# Patient Record
Sex: FEMALE | Race: BLACK OR AFRICAN AMERICAN | NOT HISPANIC OR LATINO | Employment: OTHER | URBAN - METROPOLITAN AREA
[De-identification: names, ages, dates, MRNs, and addresses within clinical notes are randomized per-mention and may not be internally consistent; named-entity substitution may affect disease eponyms.]

---

## 2023-04-10 LAB
ALANINE AMINOTRANSFERASE (SGPT) (U/L) IN SER/PLAS: 8 U/L (ref 7–45)
ALBUMIN (G/DL) IN SER/PLAS: 3.9 G/DL (ref 3.4–5)
ALKALINE PHOSPHATASE (U/L) IN SER/PLAS: 57 U/L (ref 33–136)
ASPARTATE AMINOTRANSFERASE (SGOT) (U/L) IN SER/PLAS: 16 U/L (ref 9–39)
BILIRUBIN DIRECT (MG/DL) IN SER/PLAS: 0.1 MG/DL (ref 0–0.3)
BILIRUBIN TOTAL (MG/DL) IN SER/PLAS: 0.9 MG/DL (ref 0–1.2)
ERYTHROCYTE DISTRIBUTION WIDTH (RATIO) BY AUTOMATED COUNT: 17.2 % (ref 11.5–14.5)
ERYTHROCYTE MEAN CORPUSCULAR HEMOGLOBIN CONCENTRATION (G/DL) BY AUTOMATED: 34.7 G/DL (ref 32–36)
ERYTHROCYTE MEAN CORPUSCULAR VOLUME (FL) BY AUTOMATED COUNT: 82 FL (ref 80–100)
ERYTHROCYTES (10*6/UL) IN BLOOD BY AUTOMATED COUNT: 4.02 X10E12/L (ref 4–5.2)
HEMATOCRIT (%) IN BLOOD BY AUTOMATED COUNT: 32.9 % (ref 36–46)
HEMOGLOBIN (G/DL) IN BLOOD: 11.4 G/DL (ref 12–16)
IRON (UG/DL) IN SER/PLAS: 80 UG/DL (ref 35–150)
IRON BINDING CAPACITY (UG/DL) IN SER/PLAS: 366 UG/DL (ref 240–445)
IRON SATURATION (%) IN SER/PLAS: 22 % (ref 25–45)
LEUKOCYTES (10*3/UL) IN BLOOD BY AUTOMATED COUNT: 6.4 X10E9/L (ref 4.4–11.3)
NRBC (PER 100 WBCS) BY AUTOMATED COUNT: 0 /100 WBC (ref 0–0)
PLATELETS (10*3/UL) IN BLOOD AUTOMATED COUNT: 145 X10E9/L (ref 150–450)
PROTEIN TOTAL: 7 G/DL (ref 6.4–8.2)

## 2023-07-05 LAB
ALANINE AMINOTRANSFERASE (SGPT) (U/L) IN SER/PLAS: 6 U/L (ref 7–45)
ALBUMIN (G/DL) IN SER/PLAS: 3.4 G/DL (ref 3.4–5)
ALKALINE PHOSPHATASE (U/L) IN SER/PLAS: 56 U/L (ref 33–136)
ANION GAP IN SER/PLAS: 10 MMOL/L (ref 10–20)
ASPARTATE AMINOTRANSFERASE (SGOT) (U/L) IN SER/PLAS: 13 U/L (ref 9–39)
BILIRUBIN TOTAL (MG/DL) IN SER/PLAS: 0.5 MG/DL (ref 0–1.2)
CALCIUM (MG/DL) IN SER/PLAS: 8.5 MG/DL (ref 8.6–10.3)
CARBON DIOXIDE, TOTAL (MMOL/L) IN SER/PLAS: 30 MMOL/L (ref 21–32)
CHLORIDE (MMOL/L) IN SER/PLAS: 104 MMOL/L (ref 98–107)
CREATININE (MG/DL) IN SER/PLAS: 1.18 MG/DL (ref 0.5–1.05)
ERYTHROCYTE DISTRIBUTION WIDTH (RATIO) BY AUTOMATED COUNT: 15.5 % (ref 11.5–14.5)
ERYTHROCYTE MEAN CORPUSCULAR HEMOGLOBIN CONCENTRATION (G/DL) BY AUTOMATED: 35 G/DL (ref 32–36)
ERYTHROCYTE MEAN CORPUSCULAR VOLUME (FL) BY AUTOMATED COUNT: 80 FL (ref 80–100)
ERYTHROCYTES (10*6/UL) IN BLOOD BY AUTOMATED COUNT: 4.05 X10E12/L (ref 4–5.2)
GFR FEMALE: 50 ML/MIN/1.73M2
GLUCOSE (MG/DL) IN SER/PLAS: 70 MG/DL (ref 74–99)
HEMATOCRIT (%) IN BLOOD BY AUTOMATED COUNT: 32.3 % (ref 36–46)
HEMOGLOBIN (G/DL) IN BLOOD: 11.3 G/DL (ref 12–16)
LEUKOCYTES (10*3/UL) IN BLOOD BY AUTOMATED COUNT: 5.9 X10E9/L (ref 4.4–11.3)
NRBC (PER 100 WBCS) BY AUTOMATED COUNT: 0 /100 WBC (ref 0–0)
PLATELETS (10*3/UL) IN BLOOD AUTOMATED COUNT: 123 X10E9/L (ref 150–450)
POTASSIUM (MMOL/L) IN SER/PLAS: 4.4 MMOL/L (ref 3.5–5.3)
PROTEIN TOTAL: 6.1 G/DL (ref 6.4–8.2)
SODIUM (MMOL/L) IN SER/PLAS: 140 MMOL/L (ref 136–145)
UREA NITROGEN (MG/DL) IN SER/PLAS: 20 MG/DL (ref 6–23)

## 2023-08-22 ENCOUNTER — OFFICE VISIT (OUTPATIENT)
Dept: PRIMARY CARE | Facility: CLINIC | Age: 68
End: 2023-08-22
Payer: COMMERCIAL

## 2023-08-22 VITALS
WEIGHT: 194.6 LBS | HEIGHT: 72 IN | BODY MASS INDEX: 26.36 KG/M2 | HEART RATE: 78 BPM | DIASTOLIC BLOOD PRESSURE: 66 MMHG | SYSTOLIC BLOOD PRESSURE: 118 MMHG | TEMPERATURE: 96.6 F | OXYGEN SATURATION: 98 %

## 2023-08-22 DIAGNOSIS — F51.01 PRIMARY INSOMNIA: ICD-10-CM

## 2023-08-22 DIAGNOSIS — G89.4 CHRONIC PAIN SYNDROME: Primary | ICD-10-CM

## 2023-08-22 PROCEDURE — 1125F AMNT PAIN NOTED PAIN PRSNT: CPT | Performed by: STUDENT IN AN ORGANIZED HEALTH CARE EDUCATION/TRAINING PROGRAM

## 2023-08-22 PROCEDURE — 99215 OFFICE O/P EST HI 40 MIN: CPT | Performed by: STUDENT IN AN ORGANIZED HEALTH CARE EDUCATION/TRAINING PROGRAM

## 2023-08-22 RX ORDER — ZOLPIDEM TARTRATE 10 MG/1
10 TABLET ORAL NIGHTLY
COMMUNITY
End: 2023-08-22 | Stop reason: SDUPTHER

## 2023-08-22 RX ORDER — PREDNISONE 10 MG/1
10 TABLET ORAL EVERY OTHER DAY
COMMUNITY
Start: 2022-10-10 | End: 2024-04-26 | Stop reason: SDUPTHER

## 2023-08-22 RX ORDER — GUAIFENESIN 600 MG/1
600 TABLET, EXTENDED RELEASE ORAL EVERY 12 HOURS PRN
COMMUNITY

## 2023-08-22 RX ORDER — ERGOCALCIFEROL 1.25 MG/1
1 CAPSULE ORAL
COMMUNITY

## 2023-08-22 RX ORDER — POLYETHYLENE GLYCOL 3350 17 G/17G
17 POWDER, FOR SOLUTION ORAL 3 TIMES DAILY PRN
COMMUNITY

## 2023-08-22 RX ORDER — ASCORBIC ACID 500 MG
500 TABLET ORAL DAILY
COMMUNITY

## 2023-08-22 RX ORDER — NITROGLYCERIN 0.4 MG/1
TABLET SUBLINGUAL
COMMUNITY

## 2023-08-22 RX ORDER — FERROUS SULFATE 325(65) MG
65 TABLET, DELAYED RELEASE (ENTERIC COATED) ORAL 3 TIMES WEEKLY
COMMUNITY

## 2023-08-22 RX ORDER — LEVOTHYROXINE SODIUM 137 UG/1
137 TABLET ORAL
COMMUNITY
End: 2023-11-21

## 2023-08-22 RX ORDER — LIDOCAINE 50 MG/G
1 PATCH TOPICAL DAILY
COMMUNITY
End: 2023-09-05

## 2023-08-22 RX ORDER — DOCUSATE SODIUM 100 MG/1
100 CAPSULE, LIQUID FILLED ORAL 2 TIMES DAILY PRN
COMMUNITY

## 2023-08-22 RX ORDER — IRON, SODIUM ASCORBATE, THIAMINE MONONITRATE, RIBOFLAVIN, PYRIDOXINE HYDROCHLORIDE, FOLIC ACID, CYANOCOBALAMIN, NIACINAMIDE, CALCIUM PANTOTHENATE, ZINC SULFATE, MAGNESIUM SULFATE, MANGANESE SULFATE, AND CUPRIC SULFATE ANHYDROUS 106; 200; 10; 6; 5; 1; 15; 30; 10; 18.2; 6.9; 1.3; .8 MG/1; MG/1; MG/1; MG/1; MG/1; MG/1; UG/1; MG/1; MG/1; MG/1; MG/1; MG/1; MG/1
1 CAPSULE ORAL DAILY
COMMUNITY

## 2023-08-22 RX ORDER — POTASSIUM CHLORIDE 1500 MG/1
1 TABLET, EXTENDED RELEASE ORAL DAILY
COMMUNITY
End: 2023-09-11 | Stop reason: SDUPTHER

## 2023-08-22 RX ORDER — DIVALPROEX SODIUM 250 MG/1
250 TABLET, FILM COATED, EXTENDED RELEASE ORAL 3 TIMES DAILY
COMMUNITY

## 2023-08-22 RX ORDER — FUROSEMIDE 20 MG/1
20 TABLET ORAL 3 TIMES WEEKLY
COMMUNITY

## 2023-08-22 RX ORDER — HYDROCODONE BITARTRATE AND ACETAMINOPHEN 5; 325 MG/1; MG/1
1 TABLET ORAL 3 TIMES DAILY PRN
COMMUNITY

## 2023-08-22 RX ORDER — HYDROXYCHLOROQUINE SULFATE 200 MG/1
1 TABLET, FILM COATED ORAL DAILY
COMMUNITY

## 2023-08-22 RX ORDER — FLUTICASONE PROPIONATE AND SALMETEROL 50; 250 UG/1; UG/1
1 POWDER RESPIRATORY (INHALATION)
COMMUNITY

## 2023-08-22 RX ORDER — ATORVASTATIN CALCIUM 20 MG/1
20 TABLET, FILM COATED ORAL DAILY
COMMUNITY
Start: 2019-01-07 | End: 2023-11-15 | Stop reason: SDUPTHER

## 2023-08-22 RX ORDER — NAPROXEN SODIUM 220 MG/1
81 TABLET, FILM COATED ORAL DAILY
COMMUNITY

## 2023-08-22 RX ORDER — ESOMEPRAZOLE MAGNESIUM 40 MG/1
40 CAPSULE, DELAYED RELEASE ORAL DAILY
COMMUNITY
End: 2023-12-18

## 2023-08-22 RX ORDER — ZOLPIDEM TARTRATE 10 MG/1
10 TABLET ORAL NIGHTLY
Qty: 30 TABLET | Refills: 5 | Status: SHIPPED | OUTPATIENT
Start: 2023-08-22 | End: 2024-01-23

## 2023-08-22 RX ORDER — LORAZEPAM 1 MG/1
1 TABLET ORAL DAILY PRN
COMMUNITY
End: 2024-02-22 | Stop reason: WASHOUT

## 2023-08-22 RX ORDER — ALBUTEROL SULFATE 0.83 MG/ML
3 SOLUTION RESPIRATORY (INHALATION) EVERY 6 HOURS PRN
COMMUNITY

## 2023-08-22 RX ORDER — DULOXETIN HYDROCHLORIDE 60 MG/1
60 CAPSULE, DELAYED RELEASE ORAL DAILY
COMMUNITY
Start: 2023-08-09 | End: 2023-09-11 | Stop reason: SDUPTHER

## 2023-08-22 RX ORDER — ALBUTEROL SULFATE 90 UG/1
2 AEROSOL, METERED RESPIRATORY (INHALATION) EVERY 4 HOURS PRN
COMMUNITY
End: 2024-04-26 | Stop reason: SDUPTHER

## 2023-08-22 ASSESSMENT — ENCOUNTER SYMPTOMS
COUGH: 0
FEVER: 0
CONSTIPATION: 0
BLOOD IN STOOL: 0
WHEEZING: 0
APNEA: 1
SHORTNESS OF BREATH: 0
VOMITING: 0
NAUSEA: 0
ABDOMINAL PAIN: 0
CHILLS: 0
SLEEP DISTURBANCE: 1
ARTHRALGIAS: 1
DIARRHEA: 0

## 2023-08-22 NOTE — PROGRESS NOTES
Subjective   Patient ID: Florencia Wang is a 67 y.o. female who presents for New Patient Visit and Med Refill (Norco, Ambien, Lidocaine patches, Lorazepam). Patient uses an oxygen tank at night set a 2LPM; she sometimes uses a portable O2 tank.    HPI   Pt is a 67 year old female with complicated medical history presents to the office for establishment and medication refill.     Right Hip Pain w/ Sciatica: Pt had been receiving cortisone shots, but in  was complicated by Sciatica nerve injury and excessive bleeding resulting in Hospital admission and rehab facility stay. Pt now uses a walker to get around, which before was only using a cane. Using Cymbalta and lidocaine patches with home physical therapy.     SLE: Hydroxychloroquine 200 mg. Follows with Dr. Hagen.     Sickle Cell: Last crisis was 30 years ago requiring hospitalization.     COPD/Asthma: On Advair, albuterol. Having to take rescue inhaler 1-2 times a week. No nightly wheezing or coughing    ROM: Not on CPAP, but uses bedside oxygen for periods of apnea.    Uncontrolled Migraines: Depakote 250mg BID (TID as needed). Follows with Dr. Paulson. Most recent one was yesterday.     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 Follows with Dr. Paulson.    CVA/TIA: On ASA/statin    CAD: Follows with Dr. Hawk. Last visit was .     Anxiety: Takes Ativan 1mg daily for anxiety and sleep. Takes Ambien nightly    Melanoma: s/p resection    Surghx: Cataract surgery, 2x , hysterectomy, cataract surgery b/l    Prevention: Colonoscopy scheduled for 2024. Hx of benign polyps. Mammogram UTD, has prescription for next appointment. UTD Pap smear.     Review of Systems   Constitutional:  Negative for chills and fever.   Eyes:  Negative for visual disturbance.   Respiratory:  Positive for apnea. Negative for cough, shortness of breath and wheezing.    Gastrointestinal:  Negative for abdominal pain,  blood in stool, constipation, diarrhea, nausea and vomiting.   Musculoskeletal:  Positive for arthralgias and gait problem.   Skin:  Negative for rash.   Psychiatric/Behavioral:  Positive for sleep disturbance.        Objective   /66 (BP Location: Left arm, Patient Position: Sitting)   Pulse 78   Temp 35.9 °C (96.6 °F) (Temporal)   Ht 1.829 m (6')   Wt 88.3 kg (194 lb 9.6 oz)   SpO2 98%   BMI 26.39 kg/m²     Physical Exam  Constitutional:       General: She is not in acute distress.     Appearance: Normal appearance. She is normal weight.   HENT:      Head: Normocephalic and atraumatic.   Neck:      Vascular: No carotid bruit.   Cardiovascular:      Rate and Rhythm: Normal rate and regular rhythm.      Pulses: Normal pulses.      Heart sounds: Normal heart sounds. No murmur heard.  Pulmonary:      Effort: Pulmonary effort is normal. No respiratory distress.      Breath sounds: Normal breath sounds. No wheezing.   Abdominal:      General: Abdomen is flat. There is no distension.      Palpations: Abdomen is soft. There is no mass.      Tenderness: There is no abdominal tenderness.   Musculoskeletal:         General: No swelling or tenderness.      Cervical back: No tenderness.   Skin:     Capillary Refill: Capillary refill takes less than 2 seconds.      Findings: No bruising, erythema or rash.   Neurological:      Mental Status: She is alert.         Assessment/Plan   1. Chronic pain syndrome  - had a very long discussion about her pain states nothing works ore has side effects has been given short supply of oxycodone in past to which helps. Has been hospitalized twice in past month as well  Currenlty opiates would not be a good solution for her  I advised to follow up with pain management  - Referral to Pain Medicine; Future    2. Primary insomnia  Oarrs revwied refill given  csa  - zolpidem (Ambien) 10 mg tablet; Take 1 tablet (10 mg) by mouth once daily at bedtime.  Dispense: 30 tablet; Refill:  5      Semaj Dee DO. PGY-1

## 2023-09-05 ENCOUNTER — OFFICE VISIT (OUTPATIENT)
Dept: PRIMARY CARE | Facility: CLINIC | Age: 68
End: 2023-09-05
Payer: COMMERCIAL

## 2023-09-05 VITALS
OXYGEN SATURATION: 98 % | WEIGHT: 194 LBS | DIASTOLIC BLOOD PRESSURE: 72 MMHG | BODY MASS INDEX: 26.28 KG/M2 | SYSTOLIC BLOOD PRESSURE: 118 MMHG | HEART RATE: 82 BPM | HEIGHT: 72 IN

## 2023-09-05 DIAGNOSIS — M19.90 ARTHRITIS: Primary | ICD-10-CM

## 2023-09-05 PROCEDURE — 3074F SYST BP LT 130 MM HG: CPT | Performed by: STUDENT IN AN ORGANIZED HEALTH CARE EDUCATION/TRAINING PROGRAM

## 2023-09-05 PROCEDURE — 1125F AMNT PAIN NOTED PAIN PRSNT: CPT | Performed by: STUDENT IN AN ORGANIZED HEALTH CARE EDUCATION/TRAINING PROGRAM

## 2023-09-05 PROCEDURE — 99213 OFFICE O/P EST LOW 20 MIN: CPT | Performed by: STUDENT IN AN ORGANIZED HEALTH CARE EDUCATION/TRAINING PROGRAM

## 2023-09-05 PROCEDURE — 1159F MED LIST DOCD IN RCRD: CPT | Performed by: STUDENT IN AN ORGANIZED HEALTH CARE EDUCATION/TRAINING PROGRAM

## 2023-09-05 PROCEDURE — 3078F DIAST BP <80 MM HG: CPT | Performed by: STUDENT IN AN ORGANIZED HEALTH CARE EDUCATION/TRAINING PROGRAM

## 2023-09-05 RX ORDER — LIDOCAINE 50 MG/G
1 PATCH TOPICAL DAILY
Qty: 30 PATCH | Refills: 11 | Status: SHIPPED | OUTPATIENT
Start: 2023-09-05 | End: 2024-09-04

## 2023-09-05 ASSESSMENT — ENCOUNTER SYMPTOMS: OCCASIONAL FEELINGS OF UNSTEADINESS: 1

## 2023-09-05 NOTE — PROGRESS NOTES
Subjective   Patient ID: Florencia Wang is a 67 y.o. female who presents for Med Refill (Lidocaine patches) and Wheelchair Evaluation (Has had the Hoveround scooter previously, needs a new one; Chronic pain, difficulty walking long distances, gait instability, frequent falls, assistance with ADLs).    HPI     Arthritis: Patient has bilateral knee and hip pain.  She has not yet followed with pain management.  Given her severe tricompartmental disease in her knees. I did recommend she see orthopedic surgeon for surgery.  She needs a renewal for her Hoveround as she has limited mobility. She has at home health aide to help her out. Prescription sent for the lidocaine patches.    Addendum:   Patient will use the Hoveround in her home and in the community so that she can be more mobile and perform ADLs (e.g. going from one room to another). She is able to transfer to and from the Hoveround and safely operate it, both mentally and physically, and she is willing and motivated to use her replacement Hoveround. Patient will use the Hoveround so that she can get to the kitchen for meals (preparation and eating), to the bathroom for the toilet, to the bedroom to dress, and any other room in the house to use it without falling.    Patient is unable to use a cane as it does not provide enough stability and unable to use a walker as it does not provide enough support. Neither cane nor walker prevents her frequent falls or increases the distance she can safely walk. She is unable to use a manual wheelchair as this does not provide her with enough mobility and due to her extensive arthritis and loss of range of motion needed to utilize a manual wheelchair. Patient no longer has the postural stability to safely operate a POV and requires a power wheelchair.     Her upper and lower body extremity strength measurement is as follows:  RUE = 3  LUE = 3  RLE = 2  LLE = 2    (End addendum)      Review of Systems   All other systems  reviewed and are negative.      Objective   /72 (BP Location: Right arm, Patient Position: Sitting)   Pulse 82   Ht 1.829 m (6')   Wt 88 kg (194 lb)   SpO2 98%   BMI 26.31 kg/m²     Physical Exam  Constitutional:       Appearance: Normal appearance.   HENT:      Head: Normocephalic and atraumatic.      Right Ear: Tympanic membrane and ear canal normal.      Left Ear: Tympanic membrane and ear canal normal.      Mouth/Throat:      Mouth: Mucous membranes are moist.      Pharynx: Oropharynx is clear.   Eyes:      Extraocular Movements: Extraocular movements intact.      Conjunctiva/sclera: Conjunctivae normal.      Pupils: Pupils are equal, round, and reactive to light.   Cardiovascular:      Rate and Rhythm: Normal rate and regular rhythm.      Pulses: Normal pulses.      Heart sounds: Normal heart sounds.   Pulmonary:      Effort: Pulmonary effort is normal.      Breath sounds: Normal breath sounds.   Abdominal:      General: Abdomen is flat. Bowel sounds are normal.      Palpations: Abdomen is soft.   Musculoskeletal:         General: Normal range of motion.      Cervical back: Normal range of motion and neck supple.   Skin:     General: Skin is warm and dry.      Capillary Refill: Capillary refill takes 2 to 3 seconds.   Neurological:      General: No focal deficit present.      Mental Status: She is alert and oriented to person, place, and time. Mental status is at baseline.   Psychiatric:         Mood and Affect: Mood normal.         Behavior: Behavior normal.         Thought Content: Thought content normal.         Judgment: Judgment normal.         Assessment/Plan   1. Arthritis  lidocaine (Lidoderm) 5 % patch    Referral to Orthopaedic Surgery      1. Arthritis    - lidocaine (Lidoderm) 5 % patch; Place 1 patch over 12 hours on the skin once daily. Apply to painful area 12 hours per day, remove for 12 hours.  Dispense: 30 patch; Refill: 11  - Referral to Orthopaedic Surgery; Future

## 2023-09-07 ENCOUNTER — TELEPHONE (OUTPATIENT)
Dept: PRIMARY CARE | Facility: CLINIC | Age: 68
End: 2023-09-07
Payer: COMMERCIAL

## 2023-09-07 NOTE — TELEPHONE ENCOUNTER
Already aware this wouldn't be approved. Patient gets through a low cost pharmacy so that she can afford it.

## 2023-09-07 NOTE — TELEPHONE ENCOUNTER
[place] from the prior [facility] department called and states the request for medication that was sent for arthritis is not FDA approved and was denied.   States this was also faxed over to our office as well.  Appeal # [id number]  The appeal can be faxed to # [phone]

## 2023-09-11 DIAGNOSIS — R60.9 EDEMA, UNSPECIFIED TYPE: ICD-10-CM

## 2023-09-11 DIAGNOSIS — F41.1 GENERALIZED ANXIETY DISORDER: ICD-10-CM

## 2023-09-11 PROBLEM — I67.1 CEREBRAL ANEURYSM, NONRUPTURED (HHS-HCC): Status: ACTIVE | Noted: 2023-06-29

## 2023-09-11 PROBLEM — R53.81 PHYSICAL DECONDITIONING: Status: ACTIVE | Noted: 2018-02-22

## 2023-09-11 PROBLEM — R29.6 REPEATED FALLS: Status: ACTIVE | Noted: 2017-09-13

## 2023-09-11 PROBLEM — G47.33 OSA (OBSTRUCTIVE SLEEP APNEA): Status: ACTIVE | Noted: 2023-09-11

## 2023-09-11 PROBLEM — M48.061 FORAMINAL STENOSIS OF LUMBAR REGION: Status: ACTIVE | Noted: 2017-10-01

## 2023-09-11 PROBLEM — G89.29 CHRONIC BACK PAIN: Status: ACTIVE | Noted: 2023-09-11

## 2023-09-11 PROBLEM — M16.0 BILATERAL HIP JOINT ARTHRITIS: Status: ACTIVE | Noted: 2018-02-22

## 2023-09-11 PROBLEM — M54.9 CHRONIC BACK PAIN: Status: ACTIVE | Noted: 2023-09-11

## 2023-09-11 PROBLEM — G89.4 CHRONIC PAIN SYNDROME: Status: ACTIVE | Noted: 2018-02-22

## 2023-09-11 PROBLEM — K44.9 HIATAL HERNIA WITH GERD: Status: ACTIVE | Noted: 2023-09-11

## 2023-09-11 PROBLEM — M79.18 DIFFUSE MYOFASCIAL PAIN SYNDROME: Status: ACTIVE | Noted: 2018-02-22

## 2023-09-11 PROBLEM — M17.0 PRIMARY OSTEOARTHRITIS OF BOTH KNEES: Status: ACTIVE | Noted: 2018-02-22

## 2023-09-11 PROBLEM — K21.9 HIATAL HERNIA WITH GERD: Status: ACTIVE | Noted: 2023-09-11

## 2023-09-11 RX ORDER — DULOXETIN HYDROCHLORIDE 60 MG/1
60 CAPSULE, DELAYED RELEASE ORAL DAILY
Qty: 90 CAPSULE | Refills: 1 | Status: SHIPPED | OUTPATIENT
Start: 2023-09-11 | End: 2024-01-23

## 2023-09-11 RX ORDER — POTASSIUM CHLORIDE 20 MEQ/1
20 TABLET, EXTENDED RELEASE ORAL DAILY
Qty: 90 TABLET | Refills: 1 | Status: SHIPPED | OUTPATIENT
Start: 2023-09-11 | End: 2024-01-31

## 2023-10-10 DIAGNOSIS — M48.061 FORAMINAL STENOSIS OF LUMBAR REGION: Primary | ICD-10-CM

## 2023-10-11 RX ORDER — TIZANIDINE 4 MG/1
4 TABLET ORAL 3 TIMES DAILY PRN
Qty: 90 TABLET | Refills: 0 | Status: SHIPPED | OUTPATIENT
Start: 2023-10-11 | End: 2023-11-08

## 2023-11-07 DIAGNOSIS — M48.061 FORAMINAL STENOSIS OF LUMBAR REGION: ICD-10-CM

## 2023-11-08 RX ORDER — TIZANIDINE 4 MG/1
4 TABLET ORAL 3 TIMES DAILY PRN
Qty: 90 TABLET | Refills: 0 | Status: SHIPPED | OUTPATIENT
Start: 2023-11-08 | End: 2023-12-06

## 2023-11-15 DIAGNOSIS — E78.2 MIXED HYPERLIPIDEMIA: ICD-10-CM

## 2023-11-16 RX ORDER — ATORVASTATIN CALCIUM 20 MG/1
20 TABLET, FILM COATED ORAL DAILY
Qty: 90 TABLET | Refills: 3 | Status: SHIPPED | OUTPATIENT
Start: 2023-11-16 | End: 2024-11-15

## 2023-11-20 DIAGNOSIS — E03.9 HYPOTHYROIDISM, UNSPECIFIED TYPE: Primary | ICD-10-CM

## 2023-11-21 RX ORDER — LEVOTHYROXINE SODIUM 137 UG/1
137 TABLET ORAL DAILY
Qty: 30 TABLET | Refills: 2 | Status: SHIPPED | OUTPATIENT
Start: 2023-11-21 | End: 2024-01-31

## 2023-12-05 ENCOUNTER — DOCUMENTATION (OUTPATIENT)
Dept: PRIMARY CARE | Facility: CLINIC | Age: 68
End: 2023-12-05
Payer: COMMERCIAL

## 2023-12-05 DIAGNOSIS — M48.061 FORAMINAL STENOSIS OF LUMBAR REGION: ICD-10-CM

## 2023-12-05 NOTE — PROGRESS NOTES
Subjective   Reason for Visit: Florencia Wang is an 68 y.o. female here for a Medicare Wellness visit.               HPI    Patient Care Team:  Toribio Reyna DO as PCP - General (Internal Medicine)     Review of Systems    Objective   Vitals:  There were no vitals taken for this visit.      Physical Exam    Assessment/Plan   Problem List Items Addressed This Visit    None

## 2023-12-06 RX ORDER — TIZANIDINE 4 MG/1
4 TABLET ORAL 3 TIMES DAILY PRN
Qty: 90 TABLET | Refills: 0 | Status: SHIPPED | OUTPATIENT
Start: 2023-12-06 | End: 2024-01-04

## 2023-12-18 DIAGNOSIS — K21.9 GASTROESOPHAGEAL REFLUX DISEASE WITHOUT ESOPHAGITIS: Primary | ICD-10-CM

## 2023-12-18 RX ORDER — ESOMEPRAZOLE MAGNESIUM 40 MG/1
40 CAPSULE, DELAYED RELEASE ORAL DAILY
Qty: 30 CAPSULE | Refills: 0 | Status: SHIPPED | OUTPATIENT
Start: 2023-12-18 | End: 2024-01-04

## 2024-01-03 ENCOUNTER — OFFICE VISIT (OUTPATIENT)
Dept: RHEUMATOLOGY | Facility: CLINIC | Age: 69
End: 2024-01-03
Payer: MEDICARE

## 2024-01-03 VITALS
BODY MASS INDEX: 25.63 KG/M2 | SYSTOLIC BLOOD PRESSURE: 131 MMHG | DIASTOLIC BLOOD PRESSURE: 72 MMHG | HEART RATE: 79 BPM | WEIGHT: 189 LBS

## 2024-01-03 DIAGNOSIS — M48.061 FORAMINAL STENOSIS OF LUMBAR REGION: ICD-10-CM

## 2024-01-03 DIAGNOSIS — K21.9 GASTROESOPHAGEAL REFLUX DISEASE WITHOUT ESOPHAGITIS: ICD-10-CM

## 2024-01-03 DIAGNOSIS — M79.7 FIBROMYALGIA: Primary | ICD-10-CM

## 2024-01-03 PROCEDURE — 99214 OFFICE O/P EST MOD 30 MIN: CPT | Performed by: INTERNAL MEDICINE

## 2024-01-03 PROCEDURE — 1159F MED LIST DOCD IN RCRD: CPT | Performed by: INTERNAL MEDICINE

## 2024-01-03 PROCEDURE — 3078F DIAST BP <80 MM HG: CPT | Performed by: INTERNAL MEDICINE

## 2024-01-03 PROCEDURE — 1125F AMNT PAIN NOTED PAIN PRSNT: CPT | Performed by: INTERNAL MEDICINE

## 2024-01-03 PROCEDURE — 3075F SYST BP GE 130 - 139MM HG: CPT | Performed by: INTERNAL MEDICINE

## 2024-01-03 PROCEDURE — 1036F TOBACCO NON-USER: CPT | Performed by: INTERNAL MEDICINE

## 2024-01-03 RX ORDER — DULOXETIN HYDROCHLORIDE 30 MG/1
30 CAPSULE, DELAYED RELEASE ORAL DAILY
Qty: 30 CAPSULE | Refills: 11 | Status: SHIPPED | OUTPATIENT
Start: 2024-01-03 | End: 2024-03-11

## 2024-01-03 NOTE — PROGRESS NOTES
PP: 68-year-old female with history of lupus, coronary artery disease, hypothyroidism, epilepsy, hemoglobin C trait.  CC: Pain in lower back and right hip.  [place]: She has a longstanding history of pain involving her lower back and hips.  She has been treated with analgesic medications as well as local steroid injections with temporary improvement in the lower back and bilateral hip and bilateral knee pain.  She was apparently given an injection to the right hip via a lateral approach on 2023 that was complicated by bleeding and exacerbation of the pain in the right hip and right lower extremity with possible sciatic nerve injury.  She presented to the emergency department 2023 when she was admitted to the hospital and subsequently discharged to a rehabilitation facility.  Since that time she continues to note intense pain involving the right hip causing difficulty with walking or standing.  She has weakness in the right lower extremity.  She uses a walker to ambulate whereas she previously used a cane to ambulate.  She notes some improvement in the right hip and leg pain since taking Tylenol 3 for pain relief prophylaxis following dental extractions.  PH: Allergies: Cyclobenzaprine (TIA), gabapentin (altered mental status), IV contrast dye (swelling), sulfur (hives), sumatriptan hand (stroke) sertraline, adhesive tape (skin burns); illnesses: Thrombocytopenia, hemoglobin C trait, osteoarthritis of the spine and knees, asthma, COPD, depression, epilepsy, hypothyroidism, systemic lupus erythematosus, coronary artery disease, migraine headaches, hiatus hernia, benign positional vertigo, TIA, influenza A virus infection (2022); surgeries: Hysterectomy (), 2  sections, surgery for cancer (2008)  FH: Her father had cancer.  Her mother had cancer, she has 3 brothers and 7 sisters.  One of her sisters  with sickle cell disease.  SH: Tobacco: She quit tobacco smoking in .  She drinks  alcohol occasionally.  She uses marijuana.  PX: She is a thin well-developed, well-nourished, black female.  The head examination is remarkable for multiple teeth absent from the upper kelsey.  The lungs are clear to auscultation.  The heart is regular in rhythm with normal heart sounds.  Abdomen is benign.  Extremities are without edema.  The neurological examination shows her to be alert.  Muscle strength is 3/5 on the right hip flexor and 5/5 in the left hip flexor as well as the knee flexors and knee extensors bilaterally.  The musculoskeletal examination shows pain in the right hip and buttock with active as well as passive flexion of the right hip.  There is bony prominence of the knees without joint effusion.  The straight leg raise test is normal bilaterally in the seated position.  She stands with a stooped forward posture.  Laboratory: (7/5/2023) WBC 5.9, hemoglobin 11.3, hematocrit 32.3, platelets 123, glucose 70, potassium 4.4, bicarbonate 30, BUN 20, creatinine 1.18,Calcium 8.5, albumin 3.4, alkaline phosphatase 56, AST 13, ALT 6, (7/22/2022) KVNG/DANNIELLE panel negative, (4/29/2013) KVNG 1: 40, RNP 2.1, SM/RNP 4.3, chromatin 1.0, DNA 7.0.  MRI brain (6/28/2023)No evidence of acute infarct, intracranial mass, midline shift.  There is mild degree of nonspecific white matter signal changes.  There are old lacunar infarcts in the bilateral cerebellum.  Carotid duplex ultrasound (6/28/2023) less than 50% stenosis of the right and left proximal internal carotid arteries.  The vertebral arteries are patent bilaterally.  X-ray bilateral hips (6/8/2022) advanced osteoarthritis of bilateral hips without evidence of fracture.  Impression: 68-year-old black female with osteoarthritis, hemoglobin C trait, asthma, depression, history of epilepsy, hypothyroidism, systemic lupus erythematosus, right hip and lower back pain.  Plan: She is to increase Cymbalta from 60 mg daily to 60 mg every morning and 30 mg every evening to  attempt to improve pain control lower back and right hip.  She is referred to orthopedics regarding pain and right hip and buttock.  She is to return at the next available office appointment.

## 2024-01-04 RX ORDER — ESOMEPRAZOLE MAGNESIUM 40 MG/1
40 CAPSULE, DELAYED RELEASE ORAL DAILY
Qty: 30 CAPSULE | Refills: 0 | Status: SHIPPED | OUTPATIENT
Start: 2024-01-04 | End: 2024-01-31

## 2024-01-04 RX ORDER — TIZANIDINE 4 MG/1
4 TABLET ORAL 3 TIMES DAILY PRN
Qty: 90 TABLET | Refills: 0 | Status: SHIPPED | OUTPATIENT
Start: 2024-01-04 | End: 2024-01-30

## 2024-01-17 ENCOUNTER — NURSE TRIAGE (OUTPATIENT)
Dept: PRIMARY CARE | Facility: CLINIC | Age: 69
End: 2024-01-17
Payer: MEDICARE

## 2024-01-22 DIAGNOSIS — F41.1 GENERALIZED ANXIETY DISORDER: ICD-10-CM

## 2024-01-22 DIAGNOSIS — F51.01 PRIMARY INSOMNIA: ICD-10-CM

## 2024-01-23 RX ORDER — DULOXETIN HYDROCHLORIDE 60 MG/1
60 CAPSULE, DELAYED RELEASE ORAL DAILY
Qty: 30 CAPSULE | Refills: 1 | Status: SHIPPED | OUTPATIENT
Start: 2024-01-23 | End: 2024-03-07

## 2024-01-23 RX ORDER — ZOLPIDEM TARTRATE 10 MG/1
10 TABLET ORAL NIGHTLY
Qty: 30 TABLET | Refills: 5 | Status: SHIPPED | OUTPATIENT
Start: 2024-01-23

## 2024-01-30 DIAGNOSIS — M48.061 FORAMINAL STENOSIS OF LUMBAR REGION: ICD-10-CM

## 2024-01-30 DIAGNOSIS — K21.9 GASTROESOPHAGEAL REFLUX DISEASE WITHOUT ESOPHAGITIS: ICD-10-CM

## 2024-01-30 DIAGNOSIS — R60.9 EDEMA, UNSPECIFIED TYPE: ICD-10-CM

## 2024-01-30 DIAGNOSIS — E03.9 HYPOTHYROIDISM, UNSPECIFIED TYPE: ICD-10-CM

## 2024-01-30 RX ORDER — TIZANIDINE 4 MG/1
4 TABLET ORAL 3 TIMES DAILY PRN
Qty: 90 TABLET | Refills: 0 | Status: SHIPPED | OUTPATIENT
Start: 2024-01-30 | End: 2024-03-13

## 2024-01-31 RX ORDER — ESOMEPRAZOLE MAGNESIUM 40 MG/1
40 CAPSULE, DELAYED RELEASE ORAL DAILY
Qty: 30 CAPSULE | Refills: 0 | Status: SHIPPED | OUTPATIENT
Start: 2024-01-31 | End: 2024-03-11

## 2024-01-31 RX ORDER — LEVOTHYROXINE SODIUM 137 UG/1
137 TABLET ORAL DAILY
Qty: 30 TABLET | Refills: 2 | Status: SHIPPED | OUTPATIENT
Start: 2024-01-31 | End: 2024-04-23

## 2024-01-31 RX ORDER — POTASSIUM CHLORIDE 20 MEQ/1
20 TABLET, EXTENDED RELEASE ORAL DAILY
Qty: 30 TABLET | Refills: 1 | Status: SHIPPED | OUTPATIENT
Start: 2024-01-31 | End: 2024-02-12

## 2024-02-01 RX ORDER — HYDROXYCHLOROQUINE SULFATE 200 MG/1
TABLET, FILM COATED ORAL DAILY
Qty: 30 TABLET | Refills: 0 | OUTPATIENT
Start: 2024-02-01

## 2024-02-01 RX ORDER — DIVALPROEX SODIUM 500 MG/1
500 TABLET, FILM COATED, EXTENDED RELEASE ORAL 2 TIMES DAILY
Qty: 60 TABLET | Refills: 0 | OUTPATIENT
Start: 2024-02-01

## 2024-02-01 RX ORDER — PREDNISONE 5 MG/1
5 TABLET ORAL DAILY
Qty: 30 TABLET | Refills: 0 | OUTPATIENT
Start: 2024-02-01

## 2024-02-09 DIAGNOSIS — R60.9 EDEMA, UNSPECIFIED TYPE: ICD-10-CM

## 2024-02-12 RX ORDER — POTASSIUM CHLORIDE 20 MEQ/1
20 TABLET, EXTENDED RELEASE ORAL DAILY
Qty: 30 TABLET | Refills: 1 | Status: SHIPPED | OUTPATIENT
Start: 2024-02-12

## 2024-02-22 ENCOUNTER — OFFICE VISIT (OUTPATIENT)
Dept: PRIMARY CARE | Facility: CLINIC | Age: 69
End: 2024-02-22
Payer: MEDICARE

## 2024-02-22 VITALS
HEART RATE: 75 BPM | OXYGEN SATURATION: 97 % | WEIGHT: 180 LBS | SYSTOLIC BLOOD PRESSURE: 120 MMHG | HEIGHT: 72 IN | DIASTOLIC BLOOD PRESSURE: 78 MMHG | BODY MASS INDEX: 24.38 KG/M2

## 2024-02-22 DIAGNOSIS — Z00.00 ROUTINE GENERAL MEDICAL EXAMINATION AT HEALTH CARE FACILITY: ICD-10-CM

## 2024-02-22 DIAGNOSIS — Z79.899 CONTROLLED SUBSTANCE AGREEMENT SIGNED: ICD-10-CM

## 2024-02-22 DIAGNOSIS — R05.9 COUGH, UNSPECIFIED TYPE: Primary | ICD-10-CM

## 2024-02-22 DIAGNOSIS — Z00.00 ROUTINE GENERAL MEDICAL EXAMINATION AT A HEALTH CARE FACILITY: ICD-10-CM

## 2024-02-22 DIAGNOSIS — H92.09 OTALGIA, UNSPECIFIED LATERALITY: ICD-10-CM

## 2024-02-22 DIAGNOSIS — D64.9 ANEMIA, UNSPECIFIED TYPE: ICD-10-CM

## 2024-02-22 LAB
AMPHETAMINES UR QL SCN: ABNORMAL
BARBITURATES UR QL SCN: ABNORMAL
BASOPHILS # BLD AUTO: 0.02 X10*3/UL (ref 0–0.1)
BASOPHILS NFR BLD AUTO: 0.4 %
BZE UR QL SCN: ABNORMAL
CANNABINOIDS UR QL SCN: ABNORMAL
CREAT UR-MCNC: 221.2 MG/DL (ref 20–320)
EOSINOPHIL # BLD AUTO: 0.15 X10*3/UL (ref 0–0.7)
EOSINOPHIL NFR BLD AUTO: 2.7 %
ERYTHROCYTE [DISTWIDTH] IN BLOOD BY AUTOMATED COUNT: 16.6 % (ref 11.5–14.5)
EST. AVERAGE GLUCOSE BLD GHB EST-MCNC: 94 MG/DL
HBA1C MFR BLD: 4.9 %
HCT VFR BLD AUTO: 37.2 % (ref 36–46)
HGB BLD-MCNC: 13 G/DL (ref 12–16)
IMM GRANULOCYTES # BLD AUTO: 0.01 X10*3/UL (ref 0–0.7)
IMM GRANULOCYTES NFR BLD AUTO: 0.2 % (ref 0–0.9)
LYMPHOCYTES # BLD AUTO: 3.47 X10*3/UL (ref 1.2–4.8)
LYMPHOCYTES NFR BLD AUTO: 61.5 %
MCH RBC QN AUTO: 27.9 PG (ref 26–34)
MCHC RBC AUTO-ENTMCNC: 34.9 G/DL (ref 32–36)
MCV RBC AUTO: 80 FL (ref 80–100)
MONOCYTES # BLD AUTO: 0.47 X10*3/UL (ref 0.1–1)
MONOCYTES NFR BLD AUTO: 8.3 %
NEUTROPHILS # BLD AUTO: 1.52 X10*3/UL (ref 1.2–7.7)
NEUTROPHILS NFR BLD AUTO: 26.9 %
NRBC BLD-RTO: 0 /100 WBCS (ref 0–0)
PCP UR QL SCN: ABNORMAL
PLATELET # BLD AUTO: 138 X10*3/UL (ref 150–450)
RBC # BLD AUTO: 4.66 X10*6/UL (ref 4–5.2)
T4 FREE SERPL-MCNC: 0.88 NG/DL (ref 0.78–1.48)
TSH SERPL-ACNC: 12.73 MIU/L (ref 0.44–3.98)
WBC # BLD AUTO: 5.6 X10*3/UL (ref 4.4–11.3)

## 2024-02-22 PROCEDURE — 80346 BENZODIAZEPINES1-12: CPT

## 2024-02-22 PROCEDURE — 80368 SEDATIVE HYPNOTICS: CPT

## 2024-02-22 PROCEDURE — 80053 COMPREHEN METABOLIC PANEL: CPT

## 2024-02-22 PROCEDURE — 1160F RVW MEDS BY RX/DR IN RCRD: CPT | Performed by: STUDENT IN AN ORGANIZED HEALTH CARE EDUCATION/TRAINING PROGRAM

## 2024-02-22 PROCEDURE — 85025 COMPLETE CBC W/AUTO DIFF WBC: CPT

## 2024-02-22 PROCEDURE — 82570 ASSAY OF URINE CREATININE: CPT

## 2024-02-22 PROCEDURE — 1170F FXNL STATUS ASSESSED: CPT | Performed by: STUDENT IN AN ORGANIZED HEALTH CARE EDUCATION/TRAINING PROGRAM

## 2024-02-22 PROCEDURE — 3078F DIAST BP <80 MM HG: CPT | Performed by: STUDENT IN AN ORGANIZED HEALTH CARE EDUCATION/TRAINING PROGRAM

## 2024-02-22 PROCEDURE — 3074F SYST BP LT 130 MM HG: CPT | Performed by: STUDENT IN AN ORGANIZED HEALTH CARE EDUCATION/TRAINING PROGRAM

## 2024-02-22 PROCEDURE — 80354 DRUG SCREENING FENTANYL: CPT

## 2024-02-22 PROCEDURE — 1125F AMNT PAIN NOTED PAIN PRSNT: CPT | Performed by: STUDENT IN AN ORGANIZED HEALTH CARE EDUCATION/TRAINING PROGRAM

## 2024-02-22 PROCEDURE — 99397 PER PM REEVAL EST PAT 65+ YR: CPT | Performed by: STUDENT IN AN ORGANIZED HEALTH CARE EDUCATION/TRAINING PROGRAM

## 2024-02-22 PROCEDURE — 80349 CANNABINOIDS NATURAL: CPT

## 2024-02-22 PROCEDURE — 84439 ASSAY OF FREE THYROXINE: CPT

## 2024-02-22 PROCEDURE — 1159F MED LIST DOCD IN RCRD: CPT | Performed by: STUDENT IN AN ORGANIZED HEALTH CARE EDUCATION/TRAINING PROGRAM

## 2024-02-22 PROCEDURE — 80061 LIPID PANEL: CPT

## 2024-02-22 PROCEDURE — G0439 PPPS, SUBSEQ VISIT: HCPCS | Performed by: STUDENT IN AN ORGANIZED HEALTH CARE EDUCATION/TRAINING PROGRAM

## 2024-02-22 PROCEDURE — 80358 DRUG SCREENING METHADONE: CPT

## 2024-02-22 PROCEDURE — 36415 COLL VENOUS BLD VENIPUNCTURE: CPT

## 2024-02-22 PROCEDURE — 80373 DRUG SCREENING TRAMADOL: CPT

## 2024-02-22 PROCEDURE — 84443 ASSAY THYROID STIM HORMONE: CPT

## 2024-02-22 PROCEDURE — 80361 OPIATES 1 OR MORE: CPT

## 2024-02-22 PROCEDURE — 1036F TOBACCO NON-USER: CPT | Performed by: STUDENT IN AN ORGANIZED HEALTH CARE EDUCATION/TRAINING PROGRAM

## 2024-02-22 PROCEDURE — 80307 DRUG TEST PRSMV CHEM ANLYZR: CPT

## 2024-02-22 PROCEDURE — 83036 HEMOGLOBIN GLYCOSYLATED A1C: CPT

## 2024-02-22 PROCEDURE — 80365 DRUG SCREENING OXYCODONE: CPT

## 2024-02-22 RX ORDER — PROMETHAZINE HYDROCHLORIDE AND DEXTROMETHORPHAN HYDROBROMIDE 6.25; 15 MG/5ML; MG/5ML
5 SYRUP ORAL EVERY 4 HOURS PRN
Qty: 120 ML | Refills: 0 | Status: SHIPPED | OUTPATIENT
Start: 2024-02-22 | End: 2024-03-23

## 2024-02-22 ASSESSMENT — ACTIVITIES OF DAILY LIVING (ADL)
BATHING: INDEPENDENT
MANAGING_FINANCES: INDEPENDENT
DOING_HOUSEWORK: INDEPENDENT
GROCERY_SHOPPING: INDEPENDENT
DRESSING: INDEPENDENT
TAKING_MEDICATION: INDEPENDENT

## 2024-02-22 ASSESSMENT — ENCOUNTER SYMPTOMS
DEPRESSION: 0
LOSS OF SENSATION IN FEET: 0
OCCASIONAL FEELINGS OF UNSTEADINESS: 0

## 2024-02-22 ASSESSMENT — PATIENT HEALTH QUESTIONNAIRE - PHQ9
10. IF YOU CHECKED OFF ANY PROBLEMS, HOW DIFFICULT HAVE THESE PROBLEMS MADE IT FOR YOU TO DO YOUR WORK, TAKE CARE OF THINGS AT HOME, OR GET ALONG WITH OTHER PEOPLE: SOMEWHAT DIFFICULT
1. LITTLE INTEREST OR PLEASURE IN DOING THINGS: NOT AT ALL
SUM OF ALL RESPONSES TO PHQ9 QUESTIONS 1 AND 2: 1
2. FEELING DOWN, DEPRESSED OR HOPELESS: SEVERAL DAYS

## 2024-02-22 NOTE — PROGRESS NOTES
Subjective   Patient ID: Florencia Wang is a 68 y.o. female who presents for Medicare Annual Wellness Visit Subsequent (fasting).    HPI     Review of Systems    Objective   /78 (BP Location: Right arm, Patient Position: Sitting, BP Cuff Size: Small adult)   Pulse 75   Ht 1.829 m (6')   Wt 81.6 kg (180 lb)   SpO2 97%   BMI 24.41 kg/m²     Physical Exam    Assessment/Plan

## 2024-02-22 NOTE — PROGRESS NOTES
Subjective   Reason for Visit: Florencia Wang is an 68 y.o. female here for a Medicare Wellness visit.               HPI      Pt is a 67 year old female with complicated medical history presents to the office for establishment and medication refill.      Right Hip Pain w/ Sciatica: Pt had been receiving cortisone shots, but in June was complicated by Sciatica nerve injury and excessive bleeding resulting in Hospital admission and rehab facility stay. Pt now uses a walker to get around, which before was only using a cane. Using Cymbalta and lidocaine patches with home physical therapy.      SLE: Hydroxychloroquine 200 mg. Follows with Dr. Hagen.      Sickle Cell: Last crisis was 30 years ago requiring hospitalization.      COPD/Asthma: On Advair, albuterol. Having to take rescue inhaler 1-2 times a week. No nightly wheezing or coughing     ROM: Not on CPAP, but uses bedside oxygen for periods of apnea.     Uncontrolled Migraines: Depakote 250mg BID (TID as needed). Follows with Dr. Paulson. Most recent one was yesterday.      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 Follows with Dr. Paulson.     CVA/TIA: On ASA/statin     CAD: Follows with Dr. Hawk. Last visit was 09/18.      Anxiety:Takes Ambien nightly    OARRS:  No data recorded  I have personally reviewed the OARRS report for Florencia Wang. I have considered the risks of abuse, dependence, addiction and diversion    Is the patient prescribed a combination of a benzodiazepine and opioid?  No    Last Urine Drug Screen / ordered today: Yes  No results found for this or any previous visit (from the past 8760 hour(s)).  Results are as expected.           Controlled Substance Agreement:  Date of the Last Agreement: 2/22/24  Reviewed Controlled Substance Agreement including but not limited to the benefits, risks, and alternatives to treatment with a Controlled Substance medication(s).    Sleep Aids:   What is the  patient's goal of therapy? sleep  Is this being achieved with current treatment? yes    Activities of Daily Living:   Is your overall impression that this patient is benefiting (symptom reduction outweighs side effects) from sleep aid therapy? No     1. Physical Functioning: Better  2. Family Relationship: Better  3. Social Relationship: Better  4. Mood: Better  5. Sleep Patterns: Better  6. Overall Function: Better       Melanoma: s/p resection     Surghx: Cataract surgery, 2x , hysterectomy, cataract surgery b/l     Prevention: Colonoscopy scheduled for 2024. Hx of benign polyps. Mammogram UTD, has prescription for next appointment. UTD Pap smear.   Patient Care Team:  Toribio Reyna, DO as PCP - General (Internal Medicine)  Toribio Reyna, DO as PCP - [employer] Medicare Advantage PCP  Toribio Reyna, DO as PCP - [employer] Medicare Advantage PCP     Review of Systems   All other systems reviewed and are negative.      Objective   Vitals:  There were no vitals taken for this visit.    Vitals:    24 1308   BP: 120/78   Pulse: 75   SpO2: 97%       Physical Exam  Constitutional:       Appearance: Normal appearance.   HENT:      Head: Normocephalic and atraumatic.      Right Ear: Tympanic membrane and ear canal normal.      Left Ear: Tympanic membrane and ear canal normal.      Mouth/Throat:      Mouth: Mucous membranes are moist.      Pharynx: Oropharynx is clear.   Eyes:      Extraocular Movements: Extraocular movements intact.      Conjunctiva/sclera: Conjunctivae normal.      Pupils: Pupils are equal, round, and reactive to light.   Cardiovascular:      Rate and Rhythm: Normal rate and regular rhythm.      Pulses: Normal pulses.      Heart sounds: Normal heart sounds.   Pulmonary:      Effort: Pulmonary effort is normal.      Breath sounds: Normal breath sounds.   Abdominal:      General: Abdomen is flat. Bowel sounds are normal.      Palpations: Abdomen is soft.   Musculoskeletal:         General: Normal range of  motion.      Cervical back: Normal range of motion and neck supple.   Skin:     General: Skin is warm and dry.      Capillary Refill: Capillary refill takes 2 to 3 seconds.   Neurological:      General: No focal deficit present.      Mental Status: She is alert and oriented to person, place, and time. Mental status is at baseline.   Psychiatric:         Mood and Affect: Mood normal.         Behavior: Behavior normal.         Thought Content: Thought content normal.         Judgment: Judgment normal.       Assessment/Plan   1. Cough, unspecified type    - promethazine-DM (Phenergan-DM) 6.25-15 mg/5 mL syrup; Take 5 mL by mouth every 4 hours if needed for cough.  Dispense: 120 mL; Refill: 0    2. Otalgia, unspecified laterality    - Referral to ENT; Future    3. Routine general medical examination at a health care facility    - CBC and Auto Differential  - Comprehensive Metabolic Panel  - Lipid Panel  - TSH with reflex to Free T4 if abnormal  - Hemoglobin A1C  - Thyroxine, Free  Declines colon cancer screening    4. Anemia, unspecified type  - CBC and Auto Differential    5. Controlled substance agreement signed    - Opiate/Opioid/Benzo Prescription Compliance  - OOB Internal Tracking  - THC (Marijuana), Urine, Confirmation

## 2024-02-23 LAB
ALBUMIN SERPL BCP-MCNC: 4.1 G/DL (ref 3.4–5)
ALP SERPL-CCNC: 65 U/L (ref 33–136)
ALT SERPL W P-5'-P-CCNC: 7 U/L (ref 7–45)
ANION GAP SERPL CALC-SCNC: 15 MMOL/L (ref 10–20)
AST SERPL W P-5'-P-CCNC: 19 U/L (ref 9–39)
BILIRUB SERPL-MCNC: 0.8 MG/DL (ref 0–1.2)
BUN SERPL-MCNC: 10 MG/DL (ref 6–23)
CALCIUM SERPL-MCNC: 9.4 MG/DL (ref 8.6–10.6)
CHLORIDE SERPL-SCNC: 105 MMOL/L (ref 98–107)
CHOLEST SERPL-MCNC: 164 MG/DL (ref 0–199)
CHOLESTEROL/HDL RATIO: 3.9
CO2 SERPL-SCNC: 23 MMOL/L (ref 21–32)
CREAT SERPL-MCNC: 0.87 MG/DL (ref 0.5–1.05)
EGFRCR SERPLBLD CKD-EPI 2021: 72 ML/MIN/1.73M*2
GLUCOSE SERPL-MCNC: 103 MG/DL (ref 74–99)
HDLC SERPL-MCNC: 41.9 MG/DL
LDLC SERPL CALC-MCNC: 100 MG/DL
NON HDL CHOLESTEROL: 122 MG/DL (ref 0–149)
POTASSIUM SERPL-SCNC: 4.1 MMOL/L (ref 3.5–5.3)
PROT SERPL-MCNC: 7.2 G/DL (ref 6.4–8.2)
SODIUM SERPL-SCNC: 139 MMOL/L (ref 136–145)
TRIGL SERPL-MCNC: 112 MG/DL (ref 0–149)
VLDL: 22 MG/DL (ref 0–40)

## 2024-02-23 NOTE — PROGRESS NOTES
Subjective   Reason for Visit: Florencia Wang is an 68 y.o. female here for a Medicare Wellness visit.               HPI    Patient Care Team:  Toribio Reyna DO as PCP - General (Internal Medicine)  Toribio Reyna DO as PCP - [employer] Medicare Advantage PCP  Toribio Reyna DO as PCP - [employer] Medicare Advantage PCP     Review of Systems    Objective   Vitals:  There were no vitals taken for this visit.      Physical Exam    Assessment/Plan   Problem List Items Addressed This Visit    None

## 2024-02-26 LAB — CARBOXYTHC UR-MCNC: >500 NG/ML

## 2024-02-29 LAB
1OH-MIDAZOLAM UR CFM-MCNC: <25 NG/ML
6MAM UR CFM-MCNC: <25 NG/ML
7AMINOCLONAZEPAM UR CFM-MCNC: <25 NG/ML
A-OH ALPRAZ UR CFM-MCNC: <25 NG/ML
ALPRAZ UR CFM-MCNC: <25 NG/ML
CHLORDIAZEP UR CFM-MCNC: <25 NG/ML
CLONAZEPAM UR CFM-MCNC: <25 NG/ML
CODEINE UR CFM-MCNC: >2500 NG/ML
DIAZEPAM UR CFM-MCNC: <25 NG/ML
EDDP UR CFM-MCNC: <25 NG/ML
FENTANYL UR CFM-MCNC: <2.5 NG/ML
HYDROCODONE CTO UR CFM-MCNC: 27 NG/ML
HYDROMORPHONE UR CFM-MCNC: <25 NG/ML
LORAZEPAM UR CFM-MCNC: <25 NG/ML
METHADONE UR CFM-MCNC: <25 NG/ML
MIDAZOLAM UR CFM-MCNC: <25 NG/ML
MORPHINE UR CFM-MCNC: 214 NG/ML
NORDIAZEPAM UR CFM-MCNC: <25 NG/ML
NORFENTANYL UR CFM-MCNC: <2.5 NG/ML
NORHYDROCODONE UR CFM-MCNC: 60 NG/ML
NOROXYCODONE UR CFM-MCNC: <25 NG/ML
NORTRAMADOL UR-MCNC: <50 NG/ML
OXAZEPAM UR CFM-MCNC: <25 NG/ML
OXYCODONE UR CFM-MCNC: <25 NG/ML
OXYMORPHONE UR CFM-MCNC: <25 NG/ML
TEMAZEPAM UR CFM-MCNC: <25 NG/ML
TRAMADOL UR CFM-MCNC: <50 NG/ML
ZOLPIDEM UR CFM-MCNC: <25 NG/ML
ZOLPIDEM UR-MCNC: <25 NG/ML

## 2024-03-06 DIAGNOSIS — F41.1 GENERALIZED ANXIETY DISORDER: ICD-10-CM

## 2024-03-07 RX ORDER — DULOXETIN HYDROCHLORIDE 60 MG/1
60 CAPSULE, DELAYED RELEASE ORAL DAILY
Qty: 90 CAPSULE | Refills: 1 | Status: SHIPPED | OUTPATIENT
Start: 2024-03-07 | End: 2024-09-03

## 2024-03-08 DIAGNOSIS — M48.061 FORAMINAL STENOSIS OF LUMBAR REGION: ICD-10-CM

## 2024-03-08 DIAGNOSIS — R60.9 EDEMA, UNSPECIFIED TYPE: ICD-10-CM

## 2024-03-08 DIAGNOSIS — K21.9 GASTROESOPHAGEAL REFLUX DISEASE WITHOUT ESOPHAGITIS: ICD-10-CM

## 2024-03-11 DIAGNOSIS — M79.7 FIBROMYALGIA: ICD-10-CM

## 2024-03-11 DIAGNOSIS — M48.061 FORAMINAL STENOSIS OF LUMBAR REGION: ICD-10-CM

## 2024-03-11 RX ORDER — ESOMEPRAZOLE MAGNESIUM 40 MG/1
40 CAPSULE, DELAYED RELEASE ORAL DAILY
Qty: 30 CAPSULE | Refills: 0 | Status: SHIPPED | OUTPATIENT
Start: 2024-03-11 | End: 2024-04-08

## 2024-03-11 RX ORDER — POTASSIUM CHLORIDE 1500 MG/1
20 TABLET, EXTENDED RELEASE ORAL DAILY
Qty: 30 TABLET | Refills: 1 | Status: SHIPPED | OUTPATIENT
Start: 2024-03-11 | End: 2024-04-23

## 2024-03-13 RX ORDER — TIZANIDINE 4 MG/1
4 TABLET ORAL 3 TIMES DAILY PRN
Qty: 90 TABLET | Refills: 1 | Status: SHIPPED | OUTPATIENT
Start: 2024-03-13 | End: 2024-06-02

## 2024-03-13 RX ORDER — DULOXETIN HYDROCHLORIDE 30 MG/1
CAPSULE, DELAYED RELEASE ORAL
Qty: 30 CAPSULE | Refills: 11 | Status: SHIPPED | OUTPATIENT
Start: 2024-03-13

## 2024-03-13 RX ORDER — TIZANIDINE 4 MG/1
4 TABLET ORAL 3 TIMES DAILY PRN
Qty: 90 TABLET | Refills: 0 | Status: SHIPPED | OUTPATIENT
Start: 2024-03-13

## 2024-03-28 PROBLEM — R68.89 INFLUENZA-LIKE SYMPTOMS: Status: ACTIVE | Noted: 2024-03-28

## 2024-03-28 PROBLEM — F11.90 NARCOTIC DRUG USE: Status: ACTIVE | Noted: 2024-03-28

## 2024-03-28 PROBLEM — R06.01 ORTHOPNEA: Status: ACTIVE | Noted: 2024-03-28

## 2024-03-28 PROBLEM — G45.9 TRANSIENT ISCHEMIC ATTACK: Status: ACTIVE | Noted: 2024-03-28

## 2024-03-28 PROBLEM — F12.20 CANNABIS DEPENDENCE (MULTI): Status: ACTIVE | Noted: 2024-03-28

## 2024-03-28 PROBLEM — H90.3 SENSORINEURAL HEARING LOSS (SNHL) OF BOTH EARS: Status: ACTIVE | Noted: 2024-03-28

## 2024-03-28 PROBLEM — Z86.0100 HISTORY OF COLON POLYPS: Status: ACTIVE | Noted: 2017-03-24

## 2024-03-28 PROBLEM — K44.9 HIATAL HERNIA: Status: ACTIVE | Noted: 2022-12-21

## 2024-03-28 PROBLEM — R77.8 OTHER SPECIFIED ABNORMALITIES OF PLASMA PROTEINS: Status: ACTIVE | Noted: 2023-02-22

## 2024-03-28 PROBLEM — G47.30 SLEEP APNEA, UNSPECIFIED: Status: ACTIVE | Noted: 2023-06-29

## 2024-03-28 PROBLEM — I25.2 OLD MYOCARDIAL INFARCTION: Status: ACTIVE | Noted: 2023-06-29

## 2024-03-28 PROBLEM — F54 PSYCHOLOGICAL FACTORS AFFECTING MEDICAL CONDITION: Status: ACTIVE | Noted: 2018-02-22

## 2024-03-28 PROBLEM — Z88.6 ASPIRIN SENSITIVITY: Status: ACTIVE | Noted: 2024-03-28

## 2024-03-28 PROBLEM — M23.301: Status: ACTIVE | Noted: 2022-12-01

## 2024-03-28 PROBLEM — R26.81 UNSTEADY GAIT: Status: ACTIVE | Noted: 2024-03-28

## 2024-03-28 PROBLEM — Z90.710 ACQUIRED ABSENCE OF BOTH CERVIX AND UTERUS: Status: ACTIVE | Noted: 2023-02-22

## 2024-03-28 PROBLEM — Z86.79 HISTORY OF HEART FAILURE: Status: ACTIVE | Noted: 2024-03-28

## 2024-03-28 PROBLEM — M54.16 BILATERAL LUMBAR RADICULOPATHY: Status: ACTIVE | Noted: 2017-10-01

## 2024-03-28 PROBLEM — Z78.0 POSTMENOPAUSAL STATE: Status: ACTIVE | Noted: 2024-03-28

## 2024-03-28 PROBLEM — R07.9 CHEST PAIN: Status: ACTIVE | Noted: 2018-02-22

## 2024-03-28 PROBLEM — Z86.59 HISTORY OF DEPRESSION: Status: ACTIVE | Noted: 2023-06-29

## 2024-03-28 PROBLEM — R69 DISEASE SUSPECTED: Status: ACTIVE | Noted: 2024-03-28

## 2024-03-28 PROBLEM — J39.2 IRRITATION OF PHARYNX: Status: ACTIVE | Noted: 2024-03-28

## 2024-03-28 PROBLEM — F11.19: Status: ACTIVE | Noted: 2024-03-28

## 2024-03-28 PROBLEM — S52.602A CLOSED FRACTURE OF LOWER END OF LEFT ULNA: Status: ACTIVE | Noted: 2019-06-11

## 2024-03-28 PROBLEM — H61.20 EXCESSIVE CERUMEN IN EAR CANAL: Status: ACTIVE | Noted: 2024-03-28

## 2024-03-28 PROBLEM — R20.2 PARESTHESIA: Status: ACTIVE | Noted: 2024-03-28

## 2024-03-28 PROBLEM — I25.2 HISTORY OF MYOCARDIAL INFARCTION: Status: ACTIVE | Noted: 2023-06-29

## 2024-03-28 PROBLEM — D64.9 ANEMIA: Status: ACTIVE | Noted: 2024-03-28
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
## 2024-03-28 PROBLEM — D69.6 LOW PLATELET COUNT (CMS-HCC): Status: ACTIVE | Noted: 2024-03-28

## 2024-03-28 PROBLEM — R53.1 WEAKNESS: Status: ACTIVE | Noted: 2024-03-28

## 2024-03-28 PROBLEM — R79.9 ABNORMAL SERUM TOTAL PROTEIN LEVEL: Status: ACTIVE | Noted: 2024-03-28

## 2024-03-28 PROBLEM — I77.1 STRICTURE OF ARTERY (CMS-HCC): Status: ACTIVE | Noted: 2022-07-28

## 2024-03-28 PROBLEM — K14.6 GLOSSODYNIA: Status: ACTIVE | Noted: 2024-03-28

## 2024-03-28 PROBLEM — M47.816 LUMBAR SPONDYLOSIS: Status: ACTIVE | Noted: 2018-02-22

## 2024-03-28 PROBLEM — K62.89 ANAL OR RECTAL PAIN: Status: ACTIVE | Noted: 2017-03-24

## 2024-03-28 PROBLEM — U07.1 DISEASE DUE TO SEVERE ACUTE RESPIRATORY SYNDROME CORONAVIRUS 2 (SARS-COV-2): Status: ACTIVE | Noted: 2024-03-28

## 2024-03-28 PROBLEM — R42 DIZZINESS AND GIDDINESS: Status: ACTIVE | Noted: 2023-06-27

## 2024-03-28 PROBLEM — Z20.822 CONTACT WITH AND (SUSPECTED) EXPOSURE TO COVID-19: Status: ACTIVE | Noted: 2023-02-22

## 2024-03-28 PROBLEM — H93.8X9 PRESSURE SENSATION IN EAR: Status: ACTIVE | Noted: 2024-03-28

## 2024-03-28 PROBLEM — K29.70 GASTRITIS: Status: ACTIVE | Noted: 2023-02-22

## 2024-03-28 PROBLEM — M70.61 TROCHANTERIC BURSITIS, RIGHT HIP: Status: ACTIVE | Noted: 2022-12-07

## 2024-03-28 PROBLEM — I50.9 HEART FAILURE (MULTI): Status: ACTIVE | Noted: 2023-02-22

## 2024-03-28 PROBLEM — M79.602 PAIN OF LEFT UPPER EXTREMITY: Status: ACTIVE | Noted: 2024-03-28

## 2024-03-28 PROBLEM — Z86.69 HISTORY OF MIGRAINE: Status: ACTIVE | Noted: 2024-03-28

## 2024-03-28 PROBLEM — E86.0 DEHYDRATION: Status: ACTIVE | Noted: 2023-06-29

## 2024-03-28 PROBLEM — H93.13 TINNITUS OF BOTH EARS: Status: ACTIVE | Noted: 2024-03-28

## 2024-03-28 PROBLEM — M70.62 TROCHANTERIC BURSITIS, LEFT HIP: Status: ACTIVE | Noted: 2022-12-07

## 2024-03-28 PROBLEM — R63.5 WEIGHT GAIN: Status: ACTIVE | Noted: 2024-03-28

## 2024-03-28 PROBLEM — K92.1 BLACK STOOLS: Status: ACTIVE | Noted: 2024-03-28

## 2024-03-28 PROBLEM — Z86.010 HISTORY OF COLON POLYPS: Status: ACTIVE | Noted: 2017-03-24

## 2024-03-28 PROBLEM — R79.89 ELEVATED TROPONIN LEVEL: Status: ACTIVE | Noted: 2024-03-28

## 2024-03-28 PROBLEM — D16.9 ENCHONDROMA OF BONE: Status: ACTIVE | Noted: 2024-03-28

## 2024-03-28 PROBLEM — J34.89 NASAL DISCHARGE: Status: ACTIVE | Noted: 2024-03-28

## 2024-03-28 PROBLEM — Z86.39 HISTORY OF HYPERCHOLESTEROLEMIA: Status: ACTIVE | Noted: 2024-03-28

## 2024-03-28 RX ORDER — ISOSORBIDE MONONITRATE 30 MG/1
1 TABLET, EXTENDED RELEASE ORAL DAILY
COMMUNITY

## 2024-03-28 RX ORDER — METOPROLOL SUCCINATE 25 MG/1
1 TABLET, EXTENDED RELEASE ORAL DAILY
COMMUNITY

## 2024-04-02 ENCOUNTER — APPOINTMENT (OUTPATIENT)
Dept: AUDIOLOGY | Facility: CLINIC | Age: 69
End: 2024-04-02
Payer: MEDICARE

## 2024-04-04 ENCOUNTER — DOCUMENTATION (OUTPATIENT)
Dept: PRIMARY CARE | Facility: CLINIC | Age: 69
End: 2024-04-04
Payer: MEDICARE

## 2024-04-05 DIAGNOSIS — K21.9 GASTROESOPHAGEAL REFLUX DISEASE WITHOUT ESOPHAGITIS: ICD-10-CM

## 2024-04-08 RX ORDER — ESOMEPRAZOLE MAGNESIUM 40 MG/1
40 CAPSULE, DELAYED RELEASE ORAL DAILY
Qty: 30 CAPSULE | Refills: 0 | Status: SHIPPED | OUTPATIENT
Start: 2024-04-08 | End: 2024-04-23

## 2024-04-23 DIAGNOSIS — K21.9 GASTROESOPHAGEAL REFLUX DISEASE WITHOUT ESOPHAGITIS: ICD-10-CM

## 2024-04-23 DIAGNOSIS — R60.9 EDEMA, UNSPECIFIED TYPE: ICD-10-CM

## 2024-04-23 DIAGNOSIS — E03.9 HYPOTHYROIDISM, UNSPECIFIED TYPE: ICD-10-CM

## 2024-04-23 RX ORDER — ESOMEPRAZOLE MAGNESIUM 40 MG/1
40 CAPSULE, DELAYED RELEASE ORAL DAILY
Qty: 30 CAPSULE | Refills: 0 | Status: SHIPPED | OUTPATIENT
Start: 2024-04-23 | End: 2024-05-21

## 2024-04-23 RX ORDER — POTASSIUM CHLORIDE 1500 MG/1
20 TABLET, EXTENDED RELEASE ORAL DAILY
Qty: 30 TABLET | Refills: 1 | Status: SHIPPED | OUTPATIENT
Start: 2024-04-23

## 2024-04-23 RX ORDER — LEVOTHYROXINE SODIUM 137 UG/1
137 TABLET ORAL DAILY
Qty: 30 TABLET | Refills: 2 | Status: SHIPPED | OUTPATIENT
Start: 2024-04-23

## 2024-04-26 DIAGNOSIS — M32.9 LUPUS (MULTI): Primary | ICD-10-CM

## 2024-04-29 RX ORDER — PREDNISONE 10 MG/1
10 TABLET ORAL EVERY OTHER DAY
Qty: 90 TABLET | Refills: 1 | Status: SHIPPED | OUTPATIENT
Start: 2024-04-29

## 2024-04-29 RX ORDER — ALBUTEROL SULFATE 90 UG/1
2 AEROSOL, METERED RESPIRATORY (INHALATION) EVERY 4 HOURS PRN
Qty: 18 G | Refills: 3 | Status: SHIPPED | OUTPATIENT
Start: 2024-04-29

## 2024-04-29 NOTE — PROGRESS NOTES
Subjective   Reason for Visit: Florencia Wang is an 68 y.o. female here for a Medicare Wellness visit.               HPI    Patient Care Team:  Toribio Reyna DO as PCP - General (Internal Medicine)  Toribio Reyna DO as PCP - [employer] Medicare Advantage PCP     Review of Systems    Objective   Vitals:  There were no vitals taken for this visit.      Physical Exam    Assessment/Plan   Problem List Items Addressed This Visit    None

## 2024-05-08 ENCOUNTER — OFFICE VISIT (OUTPATIENT)
Dept: RHEUMATOLOGY | Facility: CLINIC | Age: 69
End: 2024-05-08
Payer: MEDICARE

## 2024-05-08 VITALS
BODY MASS INDEX: 23.43 KG/M2 | WEIGHT: 173 LBS | HEART RATE: 74 BPM | SYSTOLIC BLOOD PRESSURE: 103 MMHG | DIASTOLIC BLOOD PRESSURE: 78 MMHG | HEIGHT: 72 IN

## 2024-05-08 DIAGNOSIS — M16.11 PRIMARY OSTEOARTHRITIS OF RIGHT HIP: Primary | ICD-10-CM

## 2024-05-08 PROCEDURE — 1159F MED LIST DOCD IN RCRD: CPT | Performed by: INTERNAL MEDICINE

## 2024-05-08 PROCEDURE — 1036F TOBACCO NON-USER: CPT | Performed by: INTERNAL MEDICINE

## 2024-05-08 PROCEDURE — 1160F RVW MEDS BY RX/DR IN RCRD: CPT | Performed by: INTERNAL MEDICINE

## 2024-05-08 PROCEDURE — 99213 OFFICE O/P EST LOW 20 MIN: CPT | Performed by: INTERNAL MEDICINE

## 2024-05-08 PROCEDURE — 1125F AMNT PAIN NOTED PAIN PRSNT: CPT | Performed by: INTERNAL MEDICINE

## 2024-05-08 ASSESSMENT — PAIN SCALES - GENERAL: PAINLEVEL: 10-WORST PAIN EVER

## 2024-05-08 NOTE — PROGRESS NOTES
PP: 68-year-old female with history of lupus, coronary artery disease, hypothyroidism, epilepsy, hemoglobin C trait.  CC: Pain in lower back right hip and right knee.  [place]: She has a longstanding history of pain involving her lower back and hips.  She has been treated with analgesic medications as well as local steroid injections with temporary improvement in the lower back and bilateral hip and bilateral knee pain.  She was apparently given an injection to the right hip via a lateral approach on 2023 that was complicated by bleeding and exacerbation of the pain in the right hip and right lower extremity with possible sciatic nerve injury.  She presented to the emergency department 2023 when she was admitted to the hospital and subsequently discharged to a rehabilitation facility.  Since that time she continues to note intense pain involving the right hip causing difficulty with walking or standing.  She has weakness in the right lower extremity.  She uses a walker to ambulate whereas she previously used a cane to ambulate.  She notes some improvement in the right hip and leg pain with taking Tylenol #3 for pain relief prophylaxis following dental extractions.  She now notes significant pain involving the right knee even at rest.  PH: Allergies: Cyclobenzaprine (TIA), gabapentin (altered mental status), IV contrast dye (swelling), sulfur (hives), sumatriptan hand (stroke) sertraline, adhesive tape (skin burns); illnesses: Thrombocytopenia, hemoglobin C trait, osteoarthritis of the spine and knees, asthma, COPD, depression, epilepsy, hypothyroidism, systemic lupus erythematosus, coronary artery disease, migraine headaches, hiatus hernia, benign positional vertigo, TIA, influenza A virus infection (2022); surgeries: Hysterectomy (), 2  sections, surgery for cancer (2008)  FH: Her father had cancer.  Her mother had cancer, she has 3 brothers and 7 sisters.  One of her sisters  with  sickle cell disease.  SH: Tobacco: She quit tobacco smoking in 1999.  She drinks alcohol occasionally.  She uses marijuana.  PX: She is a thin well-developed, well-nourished, black female.  The head examination is remarkable for multiple teeth absent from the upper kelsey.  Extremities are without edema.  The neurological examination shows her to be alert.  Muscle strength is 3/5 on the right hip flexor and 5/5 in the left hip flexor as well as the knee flexors and knee extensors bilaterally.  The musculoskeletal examination shows pain in the right hip and buttock with active as well as passive flexion of the right hip.  There is markedly limited abduction and internal rotation of the right hip.  There is bony prominence of the knees without joint effusion.  There is good passive range of motion of the right knee.  The straight leg raise test is normal bilaterally in the seated position.  She stands with a stooped forward posture using a walker.  Laboratory: (2/22/2024) WBC 5.6, hemoglobin 13.0, hematocrit 37.2, MCV 80, MCHC 34.9, platelets 138, BUN 10, creatinine 0.87, glucose 103, calcium 9.4, albumin 4.1, alkaline phosphatase 65, AST 19, ALT 7, TSH 12.73, free T4 0.88, (7/5/2023) WBC 5.9, hemoglobin 11.3, hematocrit 32.3, platelets 123, glucose 70, potassium 4.4, bicarbonate 30, BUN 20, creatinine 1.18,Calcium 8.5, albumin 3.4, alkaline phosphatase 56, AST 13, ALT 6, (7/22/2022) KVNG/DANNIELLE panel negative, (4/29/2013) KVNG 1: 40, RNP 2.1, SM/RNP 4.3, chromatin 1.0, DNA 7.0.  MRI brain (6/28/2023)No evidence of acute infarct, intracranial mass, midline shift.  There is mild degree of nonspecific white matter signal changes.  There are old lacunar infarcts in the bilateral cerebellum.  Carotid duplex ultrasound (6/28/2023) less than 50% stenosis of the right and left proximal internal carotid arteries.  The vertebral arteries are patent bilaterally.  X-ray bilateral hips (6/8/2022) advanced osteoarthritis of bilateral  hips without evidence of fracture.  Impression: 68-year-old black female with osteoarthritis, hemoglobin C trait, asthma, depression, history of epilepsy, hypothyroidism, systemic lupus erythematosus, right hip and lower back pain.  The right knee pain is likely referred from the right hip.  Plan: She is to continue Cymbalta 60 mg every morning and 30 mg every evening.  She is referred to orthopedics regarding pain in the right hip.  She is to return at the next available office appointment.

## 2024-05-21 DIAGNOSIS — M48.061 FORAMINAL STENOSIS OF LUMBAR REGION: ICD-10-CM

## 2024-05-21 DIAGNOSIS — K21.9 GASTROESOPHAGEAL REFLUX DISEASE WITHOUT ESOPHAGITIS: ICD-10-CM

## 2024-05-21 RX ORDER — ESOMEPRAZOLE MAGNESIUM 40 MG/1
40 CAPSULE, DELAYED RELEASE ORAL DAILY
Qty: 30 CAPSULE | Refills: 0 | Status: SHIPPED | OUTPATIENT
Start: 2024-05-21

## 2024-06-02 RX ORDER — TIZANIDINE 4 MG/1
4 TABLET ORAL 3 TIMES DAILY PRN
Qty: 90 TABLET | Refills: 1 | Status: SHIPPED | OUTPATIENT
Start: 2024-06-02

## 2024-06-18 DIAGNOSIS — R60.9 EDEMA, UNSPECIFIED TYPE: ICD-10-CM

## 2024-06-18 DIAGNOSIS — K21.9 GASTROESOPHAGEAL REFLUX DISEASE WITHOUT ESOPHAGITIS: ICD-10-CM

## 2024-06-18 RX ORDER — POTASSIUM CHLORIDE 1500 MG/1
20 TABLET, EXTENDED RELEASE ORAL DAILY
Qty: 30 TABLET | Refills: 1 | Status: SHIPPED | OUTPATIENT
Start: 2024-06-18

## 2024-06-18 RX ORDER — ESOMEPRAZOLE MAGNESIUM 40 MG/1
40 CAPSULE, DELAYED RELEASE ORAL DAILY
Qty: 30 CAPSULE | Refills: 0 | Status: SHIPPED | OUTPATIENT
Start: 2024-06-18

## 2024-07-11 ENCOUNTER — APPOINTMENT (OUTPATIENT)
Dept: AUDIOLOGY | Facility: CLINIC | Age: 69
End: 2024-07-11
Payer: MEDICARE

## 2024-07-11 ENCOUNTER — APPOINTMENT (OUTPATIENT)
Dept: OTOLARYNGOLOGY | Facility: CLINIC | Age: 69
End: 2024-07-11
Payer: MEDICARE

## 2024-07-16 DIAGNOSIS — E03.9 HYPOTHYROIDISM, UNSPECIFIED TYPE: ICD-10-CM

## 2024-07-16 DIAGNOSIS — K21.9 GASTROESOPHAGEAL REFLUX DISEASE WITHOUT ESOPHAGITIS: ICD-10-CM

## 2024-07-16 DIAGNOSIS — M32.9 LUPUS (MULTI): ICD-10-CM

## 2024-07-16 DIAGNOSIS — M48.061 FORAMINAL STENOSIS OF LUMBAR REGION: ICD-10-CM

## 2024-07-16 RX ORDER — ESOMEPRAZOLE MAGNESIUM 40 MG/1
40 CAPSULE, DELAYED RELEASE ORAL DAILY
Qty: 30 CAPSULE | Refills: 0 | Status: SHIPPED | OUTPATIENT
Start: 2024-07-16

## 2024-07-16 RX ORDER — LEVOTHYROXINE SODIUM 137 UG/1
137 TABLET ORAL DAILY
Qty: 30 TABLET | Refills: 2 | Status: SHIPPED | OUTPATIENT
Start: 2024-07-16

## 2024-07-16 RX ORDER — ALBUTEROL SULFATE 90 UG/1
2 AEROSOL, METERED RESPIRATORY (INHALATION) EVERY 4 HOURS PRN
Qty: 8.5 G | Refills: 3 | Status: SHIPPED | OUTPATIENT
Start: 2024-07-16

## 2024-07-22 DIAGNOSIS — F51.01 PRIMARY INSOMNIA: ICD-10-CM

## 2024-07-22 RX ORDER — ZOLPIDEM TARTRATE 10 MG/1
10 TABLET ORAL NIGHTLY
Qty: 30 TABLET | Refills: 5 | Status: SHIPPED | OUTPATIENT
Start: 2024-07-22

## 2024-08-05 DIAGNOSIS — F51.01 PRIMARY INSOMNIA: ICD-10-CM

## 2024-08-06 RX ORDER — ZOLPIDEM TARTRATE 10 MG/1
10 TABLET ORAL NIGHTLY
Qty: 30 TABLET | Refills: 5 | Status: SHIPPED | OUTPATIENT
Start: 2024-08-06

## 2024-08-08 RX ORDER — TIZANIDINE 4 MG/1
4 TABLET ORAL 3 TIMES DAILY PRN
Qty: 90 TABLET | Refills: 1 | Status: SHIPPED | OUTPATIENT
Start: 2024-08-08

## 2024-08-22 ENCOUNTER — APPOINTMENT (OUTPATIENT)
Dept: PRIMARY CARE | Facility: CLINIC | Age: 69
End: 2024-08-22
Payer: MEDICARE

## 2024-08-22 VITALS
DIASTOLIC BLOOD PRESSURE: 70 MMHG | BODY MASS INDEX: 20.75 KG/M2 | WEIGHT: 166 LBS | SYSTOLIC BLOOD PRESSURE: 118 MMHG | HEART RATE: 76 BPM | OXYGEN SATURATION: 94 %

## 2024-08-22 DIAGNOSIS — E03.9 HYPOTHYROIDISM, UNSPECIFIED TYPE: Primary | ICD-10-CM

## 2024-08-22 DIAGNOSIS — M32.9 SYSTEMIC LUPUS ERYTHEMATOSUS, UNSPECIFIED SLE TYPE, UNSPECIFIED ORGAN INVOLVEMENT STATUS (MULTI): ICD-10-CM

## 2024-08-22 DIAGNOSIS — Z59.41 FOOD INSECURITY: ICD-10-CM

## 2024-08-22 LAB
T4 FREE SERPL-MCNC: 1.84 NG/DL (ref 0.78–1.48)
TSH SERPL-ACNC: 0.36 MIU/L (ref 0.44–3.98)

## 2024-08-22 PROCEDURE — 84439 ASSAY OF FREE THYROXINE: CPT

## 2024-08-22 PROCEDURE — 1123F ACP DISCUSS/DSCN MKR DOCD: CPT | Performed by: STUDENT IN AN ORGANIZED HEALTH CARE EDUCATION/TRAINING PROGRAM

## 2024-08-22 PROCEDURE — 1158F ADVNC CARE PLAN TLK DOCD: CPT | Performed by: STUDENT IN AN ORGANIZED HEALTH CARE EDUCATION/TRAINING PROGRAM

## 2024-08-22 PROCEDURE — 99213 OFFICE O/P EST LOW 20 MIN: CPT | Performed by: STUDENT IN AN ORGANIZED HEALTH CARE EDUCATION/TRAINING PROGRAM

## 2024-08-22 PROCEDURE — 1036F TOBACCO NON-USER: CPT | Performed by: STUDENT IN AN ORGANIZED HEALTH CARE EDUCATION/TRAINING PROGRAM

## 2024-08-22 PROCEDURE — 84443 ASSAY THYROID STIM HORMONE: CPT

## 2024-08-22 PROCEDURE — 1159F MED LIST DOCD IN RCRD: CPT | Performed by: STUDENT IN AN ORGANIZED HEALTH CARE EDUCATION/TRAINING PROGRAM

## 2024-08-22 RX ORDER — HYDROXYCHLOROQUINE SULFATE 200 MG/1
200 TABLET, FILM COATED ORAL DAILY
Qty: 90 TABLET | Refills: 1 | Status: SHIPPED | OUTPATIENT
Start: 2024-08-22

## 2024-08-22 SDOH — ECONOMIC STABILITY - FOOD INSECURITY: FOOD INSECURITY: Z59.41

## 2024-08-22 ASSESSMENT — PATIENT HEALTH QUESTIONNAIRE - PHQ9
1. LITTLE INTEREST OR PLEASURE IN DOING THINGS: NOT AT ALL
2. FEELING DOWN, DEPRESSED OR HOPELESS: NOT AT ALL
SUM OF ALL RESPONSES TO PHQ9 QUESTIONS 1 AND 2: 0

## 2024-08-22 ASSESSMENT — ENCOUNTER SYMPTOMS: DEPRESSION: 0

## 2024-08-22 NOTE — PROGRESS NOTES
Subjective   Patient ID: Florencia Wang is a 68 y.o. female who presents for Follow-up.    HPI     t is a 67 year old female with complicated medical history presents to the office for establishment and medication refill.      Right Hip Pain w/ Sciatica: Pt had been receiving cortisone shots, but in June was complicated by Sciatica nerve injury and excessive bleeding resulting in Hospital admission and rehab facility stay. Pt now uses a walker to get around, which before was only using a cane. Using Cymbalta and lidocaine patches with home physical therapy.      SLE: Hydroxychloroquine 200 mg. Follows with Dr. Hagen.      Sickle Cell: Last crisis was 30 years ago requiring hospitalization.      COPD/Asthma: On Advair, albuterol. Having to take rescue inhaler 1-2 times a week. No nightly wheezing or coughing     ROM: Not on CPAP, but uses bedside oxygen for periods of apnea.     Uncontrolled Migraines: Depakote 250mg BID (TID as needed). Follows with Dr. Paulson. Most recent one was yesterday.      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 Follows with Dr. Paulson.     CVA/TIA: On ASA/statin     CAD: Follows with Dr. Hawk. Last visit was 09/18.      Anxiety:Takes Ambien nightly    Review of Systems   All other systems reviewed and are negative.      Objective   /70 (BP Location: Right arm, Patient Position: Sitting, BP Cuff Size: Small adult)   Pulse 76   Wt 75.3 kg (166 lb)   SpO2 94%   BMI 20.75 kg/m²     Physical Exam  Constitutional:       Appearance: Normal appearance.   HENT:      Head: Normocephalic and atraumatic.      Right Ear: Tympanic membrane and ear canal normal.      Left Ear: Tympanic membrane and ear canal normal.      Mouth/Throat:      Mouth: Mucous membranes are moist.      Pharynx: Oropharynx is clear.   Eyes:      Extraocular Movements: Extraocular movements intact.      Conjunctiva/sclera: Conjunctivae normal.      Pupils: Pupils  are equal, round, and reactive to light.   Cardiovascular:      Rate and Rhythm: Normal rate and regular rhythm.      Pulses: Normal pulses.      Heart sounds: Normal heart sounds.   Pulmonary:      Effort: Pulmonary effort is normal.      Breath sounds: Normal breath sounds.   Abdominal:      General: Abdomen is flat. Bowel sounds are normal.      Palpations: Abdomen is soft.   Musculoskeletal:         General: Normal range of motion.      Cervical back: Normal range of motion and neck supple.   Skin:     General: Skin is warm and dry.      Capillary Refill: Capillary refill takes 2 to 3 seconds.   Neurological:      General: No focal deficit present.      Mental Status: She is alert and oriented to person, place, and time. Mental status is at baseline.   Psychiatric:         Mood and Affect: Mood normal.         Behavior: Behavior normal.         Thought Content: Thought content normal.         Judgment: Judgment normal.         Assessment/Plan   1. Hypothyroidism, unspecified type  - rechekc tsh  - TSH with reflex to Free T4 if abnormal    2. Systemic lupus erythematosus, unspecified SLE type, unspecified organ involvement status (Multi)    - hydroxychloroquine (Plaquenil) 200 mg tablet; Take 1 tablet (200 mg) by mouth once daily.  Dispense: 90 tablet; Refill: 1    3. Food insecurity    - Referral to [facility]; Future  d

## 2024-09-05 DIAGNOSIS — E78.2 MIXED HYPERLIPIDEMIA: ICD-10-CM

## 2024-09-05 DIAGNOSIS — F41.1 GENERALIZED ANXIETY DISORDER: ICD-10-CM

## 2024-09-05 DIAGNOSIS — R60.9 EDEMA, UNSPECIFIED TYPE: ICD-10-CM

## 2024-09-06 RX ORDER — DULOXETIN HYDROCHLORIDE 60 MG/1
60 CAPSULE, DELAYED RELEASE ORAL DAILY
Qty: 30 CAPSULE | Refills: 1 | Status: SHIPPED | OUTPATIENT
Start: 2024-09-06 | End: 2025-03-05

## 2024-09-06 RX ORDER — ATORVASTATIN CALCIUM 20 MG/1
20 TABLET, FILM COATED ORAL DAILY
Qty: 30 TABLET | Refills: 3 | Status: SHIPPED | OUTPATIENT
Start: 2024-09-06 | End: 2025-09-06

## 2024-09-06 RX ORDER — POTASSIUM CHLORIDE 1500 MG/1
20 TABLET, EXTENDED RELEASE ORAL DAILY
Qty: 30 TABLET | Refills: 1 | Status: SHIPPED | OUTPATIENT
Start: 2024-09-06

## 2024-09-17 ENCOUNTER — LAB (OUTPATIENT)
Dept: LAB | Facility: LAB | Age: 69
End: 2024-09-17
Payer: MEDICARE

## 2024-09-17 DIAGNOSIS — N18.2 CHRONIC KIDNEY DISEASE, STAGE 2 (MILD): ICD-10-CM

## 2024-09-17 DIAGNOSIS — E03.9 HYPOTHYROIDISM, UNSPECIFIED: ICD-10-CM

## 2024-09-17 DIAGNOSIS — E55.9 VITAMIN D DEFICIENCY, UNSPECIFIED: ICD-10-CM

## 2024-09-17 DIAGNOSIS — R53.82 CHRONIC FATIGUE, UNSPECIFIED: Primary | ICD-10-CM

## 2024-09-17 LAB
25(OH)D3 SERPL-MCNC: 126 NG/ML (ref 30–100)
ALBUMIN SERPL BCP-MCNC: 3.9 G/DL (ref 3.4–5)
ALP SERPL-CCNC: 57 U/L (ref 33–136)
ALT SERPL W P-5'-P-CCNC: 6 U/L (ref 7–45)
ANION GAP SERPL CALC-SCNC: 10 MMOL/L (ref 10–20)
AST SERPL W P-5'-P-CCNC: 14 U/L (ref 9–39)
BILIRUB SERPL-MCNC: 0.7 MG/DL (ref 0–1.2)
BUN SERPL-MCNC: 18 MG/DL (ref 6–23)
CALCIUM SERPL-MCNC: 9.4 MG/DL (ref 8.6–10.3)
CHLORIDE SERPL-SCNC: 104 MMOL/L (ref 98–107)
CO2 SERPL-SCNC: 30 MMOL/L (ref 21–32)
CORTIS AM PEAK SERPL-MSCNC: 8.4 UG/DL (ref 5–20)
CREAT SERPL-MCNC: 0.88 MG/DL (ref 0.5–1.05)
EGFRCR SERPLBLD CKD-EPI 2021: 71 ML/MIN/1.73M*2
ERYTHROCYTE [DISTWIDTH] IN BLOOD BY AUTOMATED COUNT: 15.5 % (ref 11.5–14.5)
ERYTHROCYTE [SEDIMENTATION RATE] IN BLOOD BY WESTERGREN METHOD: 3 MM/H (ref 0–30)
GLIADIN PEPTIDE IGA SER IA-ACNC: <1 U/ML
GLUCOSE SERPL-MCNC: 76 MG/DL (ref 74–99)
HCT VFR BLD AUTO: 32.6 % (ref 36–46)
HGB BLD-MCNC: 11.6 G/DL (ref 12–16)
MCH RBC QN AUTO: 28.2 PG (ref 26–34)
MCHC RBC AUTO-ENTMCNC: 35.6 G/DL (ref 32–36)
MCV RBC AUTO: 79 FL (ref 80–100)
NRBC BLD-RTO: 0 /100 WBCS (ref 0–0)
PHOSPHATE SERPL-MCNC: 3.8 MG/DL (ref 2.5–4.9)
PLATELET # BLD AUTO: 156 X10*3/UL (ref 150–450)
POTASSIUM SERPL-SCNC: 4.3 MMOL/L (ref 3.5–5.3)
PROT SERPL-MCNC: 6.7 G/DL (ref 6.4–8.2)
PTH-INTACT SERPL-MCNC: 56.3 PG/ML (ref 18.5–88)
RBC # BLD AUTO: 4.11 X10*6/UL (ref 4–5.2)
SODIUM SERPL-SCNC: 140 MMOL/L (ref 136–145)
T4 FREE SERPL-MCNC: 0.96 NG/DL (ref 0.61–1.12)
TSH SERPL-ACNC: 1.33 MIU/L (ref 0.44–3.98)
TTG IGA SER IA-ACNC: 2.1 U/ML
WBC # BLD AUTO: 4.9 X10*3/UL (ref 4.4–11.3)

## 2024-09-17 PROCEDURE — 85652 RBC SED RATE AUTOMATED: CPT

## 2024-09-17 PROCEDURE — 36415 COLL VENOUS BLD VENIPUNCTURE: CPT

## 2024-09-17 PROCEDURE — 80053 COMPREHEN METABOLIC PANEL: CPT

## 2024-09-17 PROCEDURE — 83516 IMMUNOASSAY NONANTIBODY: CPT

## 2024-09-17 PROCEDURE — 82024 ASSAY OF ACTH: CPT

## 2024-09-17 PROCEDURE — 82533 TOTAL CORTISOL: CPT

## 2024-09-17 PROCEDURE — 84439 ASSAY OF FREE THYROXINE: CPT

## 2024-09-17 PROCEDURE — 82306 VITAMIN D 25 HYDROXY: CPT

## 2024-09-17 PROCEDURE — 84100 ASSAY OF PHOSPHORUS: CPT

## 2024-09-17 PROCEDURE — 83970 ASSAY OF PARATHORMONE: CPT

## 2024-09-17 PROCEDURE — 85027 COMPLETE CBC AUTOMATED: CPT

## 2024-09-17 PROCEDURE — 84443 ASSAY THYROID STIM HORMONE: CPT

## 2024-09-19 LAB
ACTH PLAS-MCNC: 33.6 PG/ML (ref 7.2–63.3)
GLIADIN PEPTIDE IGG SER IA-ACNC: <0.56 FLU (ref 0–4.99)
TTG IGG SER IA-ACNC: <0.82 FLU (ref 0–4.99)

## 2024-09-23 DIAGNOSIS — E03.9 HYPOTHYROIDISM, UNSPECIFIED TYPE: ICD-10-CM

## 2024-09-23 DIAGNOSIS — M32.9 LUPUS: ICD-10-CM

## 2024-09-23 DIAGNOSIS — K21.9 GASTROESOPHAGEAL REFLUX DISEASE WITHOUT ESOPHAGITIS: ICD-10-CM

## 2024-09-23 DIAGNOSIS — R60.9 EDEMA, UNSPECIFIED TYPE: ICD-10-CM

## 2024-09-23 DIAGNOSIS — E78.2 MIXED HYPERLIPIDEMIA: ICD-10-CM

## 2024-09-23 DIAGNOSIS — M48.061 FORAMINAL STENOSIS OF LUMBAR REGION: ICD-10-CM

## 2024-09-23 RX ORDER — POTASSIUM CHLORIDE 1500 MG/1
20 TABLET, EXTENDED RELEASE ORAL DAILY
Qty: 30 TABLET | Refills: 1 | Status: SHIPPED | OUTPATIENT
Start: 2024-09-23

## 2024-09-23 RX ORDER — TIZANIDINE 4 MG/1
4 TABLET ORAL 3 TIMES DAILY PRN
Qty: 90 TABLET | Refills: 1 | Status: SHIPPED | OUTPATIENT
Start: 2024-09-23

## 2024-09-23 RX ORDER — LEVOTHYROXINE SODIUM 137 UG/1
137 TABLET ORAL DAILY
Qty: 30 TABLET | Refills: 2 | Status: SHIPPED | OUTPATIENT
Start: 2024-09-23

## 2024-09-23 RX ORDER — ATORVASTATIN CALCIUM 20 MG/1
20 TABLET, FILM COATED ORAL DAILY
Qty: 30 TABLET | Refills: 3 | Status: SHIPPED | OUTPATIENT
Start: 2024-09-23 | End: 2025-09-23

## 2024-09-23 RX ORDER — ESOMEPRAZOLE MAGNESIUM 40 MG/1
40 CAPSULE, DELAYED RELEASE ORAL DAILY
Qty: 30 CAPSULE | Refills: 0 | Status: SHIPPED | OUTPATIENT
Start: 2024-09-23

## 2024-09-23 RX ORDER — ALBUTEROL SULFATE 90 UG/1
2 INHALANT RESPIRATORY (INHALATION) EVERY 4 HOURS PRN
Qty: 8.5 G | Refills: 3 | Status: SHIPPED | OUTPATIENT
Start: 2024-09-23

## 2024-10-08 DIAGNOSIS — F41.1 GENERALIZED ANXIETY DISORDER: ICD-10-CM

## 2024-10-08 RX ORDER — DULOXETIN HYDROCHLORIDE 60 MG/1
60 CAPSULE, DELAYED RELEASE ORAL DAILY
Qty: 30 CAPSULE | Refills: 1 | Status: SHIPPED | OUTPATIENT
Start: 2024-10-08 | End: 2025-04-06

## 2024-10-15 ENCOUNTER — CLINICAL SUPPORT (OUTPATIENT)
Dept: AUDIOLOGY | Facility: CLINIC | Age: 69
End: 2024-10-15
Payer: MEDICARE

## 2024-10-15 ENCOUNTER — OFFICE VISIT (OUTPATIENT)
Dept: OTOLARYNGOLOGY | Facility: CLINIC | Age: 69
End: 2024-10-15
Payer: MEDICARE

## 2024-10-15 VITALS
OXYGEN SATURATION: 97 % | RESPIRATION RATE: 16 BRPM | HEIGHT: 72 IN | SYSTOLIC BLOOD PRESSURE: 115 MMHG | TEMPERATURE: 98.2 F | DIASTOLIC BLOOD PRESSURE: 81 MMHG | HEART RATE: 89 BPM | WEIGHT: 166.6 LBS | BODY MASS INDEX: 22.57 KG/M2

## 2024-10-15 DIAGNOSIS — H90.3 SNHL (SENSORY-NEURAL HEARING LOSS), ASYMMETRICAL: Primary | ICD-10-CM

## 2024-10-15 DIAGNOSIS — H90.3 BILATERAL SENSORINEURAL HEARING LOSS: Primary | ICD-10-CM

## 2024-10-15 PROCEDURE — 1123F ACP DISCUSS/DSCN MKR DOCD: CPT | Performed by: STUDENT IN AN ORGANIZED HEALTH CARE EDUCATION/TRAINING PROGRAM

## 2024-10-15 PROCEDURE — 1126F AMNT PAIN NOTED NONE PRSNT: CPT | Performed by: STUDENT IN AN ORGANIZED HEALTH CARE EDUCATION/TRAINING PROGRAM

## 2024-10-15 PROCEDURE — 92550 TYMPANOMETRY & REFLEX THRESH: CPT | Performed by: AUDIOLOGIST

## 2024-10-15 PROCEDURE — 1159F MED LIST DOCD IN RCRD: CPT | Performed by: STUDENT IN AN ORGANIZED HEALTH CARE EDUCATION/TRAINING PROGRAM

## 2024-10-15 PROCEDURE — 3008F BODY MASS INDEX DOCD: CPT | Performed by: STUDENT IN AN ORGANIZED HEALTH CARE EDUCATION/TRAINING PROGRAM

## 2024-10-15 PROCEDURE — 1160F RVW MEDS BY RX/DR IN RCRD: CPT | Performed by: STUDENT IN AN ORGANIZED HEALTH CARE EDUCATION/TRAINING PROGRAM

## 2024-10-15 PROCEDURE — 92557 COMPREHENSIVE HEARING TEST: CPT | Performed by: AUDIOLOGIST

## 2024-10-15 PROCEDURE — 1036F TOBACCO NON-USER: CPT | Performed by: STUDENT IN AN ORGANIZED HEALTH CARE EDUCATION/TRAINING PROGRAM

## 2024-10-15 PROCEDURE — 99213 OFFICE O/P EST LOW 20 MIN: CPT | Performed by: STUDENT IN AN ORGANIZED HEALTH CARE EDUCATION/TRAINING PROGRAM

## 2024-10-15 PROCEDURE — 99203 OFFICE O/P NEW LOW 30 MIN: CPT | Performed by: STUDENT IN AN ORGANIZED HEALTH CARE EDUCATION/TRAINING PROGRAM

## 2024-10-15 SDOH — ECONOMIC STABILITY: FOOD INSECURITY: WITHIN THE PAST 12 MONTHS, YOU WORRIED THAT YOUR FOOD WOULD RUN OUT BEFORE YOU GOT MONEY TO BUY MORE.: NEVER TRUE

## 2024-10-15 SDOH — ECONOMIC STABILITY: FOOD INSECURITY: WITHIN THE PAST 12 MONTHS, THE FOOD YOU BOUGHT JUST DIDN'T LAST AND YOU DIDN'T HAVE MONEY TO GET MORE.: NEVER TRUE

## 2024-10-15 ASSESSMENT — PATIENT HEALTH QUESTIONNAIRE - PHQ9
2. FEELING DOWN, DEPRESSED OR HOPELESS: NOT AT ALL
SUM OF ALL RESPONSES TO PHQ9 QUESTIONS 1 AND 2: 0
1. LITTLE INTEREST OR PLEASURE IN DOING THINGS: NOT AT ALL

## 2024-10-15 ASSESSMENT — COLUMBIA-SUICIDE SEVERITY RATING SCALE - C-SSRS
6. HAVE YOU EVER DONE ANYTHING, STARTED TO DO ANYTHING, OR PREPARED TO DO ANYTHING TO END YOUR LIFE?: NO
1. IN THE PAST MONTH, HAVE YOU WISHED YOU WERE DEAD OR WISHED YOU COULD GO TO SLEEP AND NOT WAKE UP?: NO
2. HAVE YOU ACTUALLY HAD ANY THOUGHTS OF KILLING YOURSELF?: NO

## 2024-10-15 ASSESSMENT — ENCOUNTER SYMPTOMS
DEPRESSION: 0
LOSS OF SENSATION IN FEET: 1
OCCASIONAL FEELINGS OF UNSTEADINESS: 1

## 2024-10-15 ASSESSMENT — LIFESTYLE VARIABLES
HOW OFTEN DO YOU HAVE SIX OR MORE DRINKS ON ONE OCCASION: NEVER
AUDIT-C TOTAL SCORE: 0
SKIP TO QUESTIONS 9-10: 1
HOW OFTEN DO YOU HAVE A DRINK CONTAINING ALCOHOL: NEVER
HOW MANY STANDARD DRINKS CONTAINING ALCOHOL DO YOU HAVE ON A TYPICAL DAY: PATIENT DOES NOT DRINK

## 2024-10-15 ASSESSMENT — PAIN SCALES - GENERAL: PAINLEVEL: 0-NO PAIN

## 2024-10-15 NOTE — PROGRESS NOTES
SUBJECTIVE  Patient ID: Florencia Wang is a 69 y.o. female who presents for new pt visit (New pt visit ears).    The patient reports that she has been having more trouble with hearing. The approximate duration of the patient’s complaint is years; was previously seen by my local colleague, Maria R Figueroa. They endorse tinnitus and otorrhea, from the right. Gets intermittent dizziness described as imbalance. They deny otalgia. They deny a history of prior ear surgery, exposure to ototoxic drugs or agents, and family history of hearing loss. 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    Review of Systems  Complete ROS negative except as noted above or on patient intake form and as above.    OBJECTIVE  Physical Exam  CONSTITUTIONAL: Well appearing female who appears stated age.  PSYCHIATRIC: Alert, appropriate mood and affect.  RESPIRATORY: Normal inspiration and expiration and chest wall expansion; no use of accessory muscles to breathe.  VOICE: Clear speech without hoarseness. No stridor nor stertor.  HEAD AND FACE: Symmetric facial features. No cutaneous masses or lesions were visualized.  RIGHT EAR:  Normal external ear and post auricular area, no visible lesions, external auditory canal cleaned of firm, non-occlusive cerumen, tympanic membrane intact, no retraction, no signs of mass, effusion, or infection within the middle ear.  LEFT EAR: Normal external ear and post auricular area, no visible lesions, external auditory canal patent, tympanic membrane intact, no retraction, no signs of mass, effusion, or infection within the middle ear.    Audiology  10/15/2024.  I personally viewed the patient's audiogram from today.  This demonstrated a mildly asymmetric sensorineural hearing loss.  On the right the patient had a normal sloping to moderate sensorineural hearing loss.  On the left the patient had a normal sloping to moderate sensorineural hearing loss, with several frequencies 10+ decibels worse  than the right.  She had excellent word recognition scores with amplification.  She had a type A tympanogram on the right and type C on the left.  She had preserved acoustic reflexes bilaterally.    Radiology  MR brain 6/28/2023  Impression  No evidence of acute infarct, intracranial mass effect or midline  shift.    ASSESSMENT/PLAN  Diagnoses and all orders for this visit:  Bilateral sensorineural hearing loss      69 y.o. female presenting with bilateral sensorineural hearing loss.    The patient notes progression of hearing loss over the last several years.  She presents with an audiogram that confirmed a bilateral sensorineural hearing loss.  The etiologies of hearing loss were discussed with the patient. We discussed the patient's options which include continued observation and amplification.  We discussed how there is no true treatment for hearing loss.  I advised the patient that hearing aids may be helpful given good word recognition scores.  I advised the patient to protect their remaining hearing. The patient should get a follow-up audiogram yearly.    The patient has mild asymmetry, but given her excellent word recognition scores and previously clear imaging, I recommended continued yearly audiograms rather than repeat imaging.    She can see me as needed.    This note was created using speech recognition transcription software. Despite proofreading, typographical errors may be present that affect the meaning of the content. Please contact my office with any questions.

## 2024-10-15 NOTE — LETTER
October 15, 2024     Toribio Reyna DO  [address] [facility] [address]    Patient: Florencia Wang   YOB: 1955   Date of Visit: 10/15/2024       Dear Dr. Toribio Reyna DO:    Thank you for referring Florencia Wang to me for evaluation. Below are my notes for this consultation.  If you have questions, please do not hesitate to call me. I look forward to following your patient along with you.       Sincerely,     Boy House MD      CC: No Recipients  ______________________________________________________________________________________    SUBJECTIVE  Patient ID: Florencia Wang is a 69 y.o. female who presents for new pt visit (New pt visit ears).    The patient reports that she has been having more trouble with hearing. The approximate duration of the patient’s complaint is years; was previously seen by my local colleague, Maria R Figueroa. They endorse tinnitus and otorrhea, from the right. Gets intermittent dizziness described as imbalance. They deny otalgia. They deny a history of prior ear surgery, exposure to ototoxic drugs or agents, and family history of hearing loss. 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    Review of Systems  Complete ROS negative except as noted above or on patient intake form and as above.    OBJECTIVE  Physical Exam  CONSTITUTIONAL: Well appearing female who appears stated age.  PSYCHIATRIC: Alert, appropriate mood and affect.  RESPIRATORY: Normal inspiration and expiration and chest wall expansion; no use of accessory muscles to breathe.  VOICE: Clear speech without hoarseness. No stridor nor stertor.  HEAD AND FACE: Symmetric facial features. No cutaneous masses or lesions were visualized.  RIGHT EAR:  Normal external ear and post auricular area, no visible lesions, external auditory canal cleaned of firm, non-occlusive cerumen, tympanic membrane intact, no retraction, no signs of mass, effusion, or infection within the middle ear.  LEFT EAR: Normal external  ear and post auricular area, no visible lesions, external auditory canal patent, tympanic membrane intact, no retraction, no signs of mass, effusion, or infection within the middle ear.    Audiology  10/15/2024.  I personally viewed the patient's audiogram from today.  This demonstrated a mildly asymmetric sensorineural hearing loss.  On the right the patient had a normal sloping to moderate sensorineural hearing loss.  On the left the patient had a normal sloping to moderate sensorineural hearing loss, with several frequencies 10+ decibels worse than the right.  She had excellent word recognition scores with amplification.  She had a type A tympanogram on the right and type C on the left.  She had preserved acoustic reflexes bilaterally.    Radiology  MR brain 6/28/2023  Impression  No evidence of acute infarct, intracranial mass effect or midline  shift.    ASSESSMENT/PLAN  Diagnoses and all orders for this visit:  Bilateral sensorineural hearing loss      69 y.o. female presenting with bilateral sensorineural hearing loss.    The patient notes progression of hearing loss over the last several years.  She presents with an audiogram that confirmed a bilateral sensorineural hearing loss.  The etiologies of hearing loss were discussed with the patient. We discussed the patient's options which include continued observation and amplification.  We discussed how there is no true treatment for hearing loss.  I advised the patient that hearing aids may be helpful given good word recognition scores.  I advised the patient to protect their remaining hearing. The patient should get a follow-up audiogram yearly.    The patient has mild asymmetry, but given her excellent word recognition scores and previously clear imaging, I recommended continued yearly audiograms rather than repeat imaging.    She can see me as needed.    This note was created using speech recognition transcription software. Despite proofreading,  typographical errors may be present that affect the meaning of the content. Please contact my office with any questions.

## 2024-10-15 NOTE — PROGRESS NOTES
"AUDIOLOGY ADULT AUDIOMETRIC EVALUATION      Name:  Florencia Wang  :  1955  Age:  69 y.o.  Date of Evaluation:  10/15/2024    HISTORY  Reason for visit:  hearing loss  Ms. Wang is seen 10/15/2024 at the request of Boy House M.D. for an evaluation of hearing.      Chief complaint:    - hearing has worsened since previous audiogram of 2022    Hearing loss: hearing has reportedly worsened - previous audiogram of 2022 showed bilateral high-frequency sensorineural hearing loss   Tinnitus:   bilateral tinnitus, not bothersome  Otitis Media: denies history of ear infections; crusting around right ear about 1 month ago  Otologic surgical history:  denies  Dizziness/imbalance:  imbalance; occasional dizziness/instable/off-balance/disoriented  Otalgia:  some pain below ears; left ear discomfort today  Ear pressure/fullness:  denies   History of excessive noise exposure:   yes, music  Other: experiences lupus, thyroid disease, COPD, history of stroke, heart attack, TIAs     Hearing aid history: none          EVALUATION  Please find audiogram in \"Media\" tab (Document Type:  Audiology Report) or included at the bottom of this note.    RESULTS   Otoscopic Evaluation: right, non-occlusive cerumen; left, clear canal     Immittance Measures (226 Hz probe tone):     Right ear:  Tympanometry is consistent with normal middle ear pressure and normal tympanic membrane mobility.     Left ear: tympanometry is consistent with low middle ear pressure and normal tympanic membrane mobility.    Ipsilateral acoustic reflexes were present for 500-4000 Hz bilaterally.       Test technique:  standard behavioral technique via insert earphones.  Reliability is good.    Pure Tone Audiometry:    Right ear:  Hearing sensitivity is in the normal hearing to moderate hearing loss range.  Left ear: Hearing sensitivity is in the normal hearing to moderate hearing loss range.    Note asymmetry for 8000 Hz (left worse than right) "     Speech Audiometry:        Right Ear:  Speech Reception Threshold (SRT) was obtained at 15 dBHL                 Speech discrimination score was 100% in quiet when words were presented at 65 dBHL      Left Ear:  Speech Reception Threshold (SRT) was obtained at 20 dBHL                 Speech discrimination score was 96% in quiet when words were presented at 70 dBHL    IMPRESSIONS:  In comparison with previous audiogram of  10/15/2023, there has been worsening 20 dB worsening of left hearing at 8000 Hz and slight (5-10 dB) worsening of left hearing for all other frequencies tested; there has been slight worsening of right hearing for the high frequencies.    Patient is expected to have communication difficulty in adverse listening environments.    Patient is expected to benefit from effective communication strategies and may benefit from devices that provide amplification and/or improve the desired sound signal over that of background noise.       RECOMMENDATIONS  Continue with ENT follow-up with Boy House M.D.   Reassess hearing in 1 year (or sooner if medically indicated or if there is a concern for a change in hearing).  \  Consider a Hearing Aid Evaluation with an audiologist to discuss hearing technology (such as hearing aids) and services.   Continue with medical follow-up as indicated.       PATIENT EDUCATION  Discussed results and recommendations with patient.  Questions were addressed and the patient was encouraged to contact our department should concerns arise.       DEREJE Pérez, [facility]  Licensed Audiologist

## 2024-10-16 ENCOUNTER — APPOINTMENT (OUTPATIENT)
Dept: RHEUMATOLOGY | Facility: CLINIC | Age: 69
End: 2024-10-16
Payer: MEDICARE

## 2024-10-23 ENCOUNTER — APPOINTMENT (OUTPATIENT)
Dept: RHEUMATOLOGY | Facility: CLINIC | Age: 69
End: 2024-10-23
Payer: MEDICARE

## 2024-10-23 VITALS
WEIGHT: 163 LBS | DIASTOLIC BLOOD PRESSURE: 72 MMHG | TEMPERATURE: 96.6 F | BODY MASS INDEX: 20.93 KG/M2 | SYSTOLIC BLOOD PRESSURE: 113 MMHG | HEART RATE: 63 BPM

## 2024-10-23 DIAGNOSIS — M15.9 OSTEOARTHRITIS, GENERALIZED: Primary | ICD-10-CM

## 2024-10-23 PROCEDURE — 1123F ACP DISCUSS/DSCN MKR DOCD: CPT | Performed by: INTERNAL MEDICINE

## 2024-10-23 PROCEDURE — 1159F MED LIST DOCD IN RCRD: CPT | Performed by: INTERNAL MEDICINE

## 2024-10-23 PROCEDURE — 1036F TOBACCO NON-USER: CPT | Performed by: INTERNAL MEDICINE

## 2024-10-23 PROCEDURE — 99213 OFFICE O/P EST LOW 20 MIN: CPT | Performed by: INTERNAL MEDICINE

## 2024-10-23 PROCEDURE — 1125F AMNT PAIN NOTED PAIN PRSNT: CPT | Performed by: INTERNAL MEDICINE

## 2024-10-23 RX ORDER — LEVOTHYROXINE SODIUM 100 UG/1
100 TABLET ORAL DAILY
COMMUNITY

## 2024-10-23 ASSESSMENT — PAIN SCALES - GENERAL: PAINLEVEL_OUTOF10: 8

## 2024-10-23 NOTE — PROGRESS NOTES
She complains of significant pain involving her hips and knees as well as pain in the lower back.  She also notes continued weight loss.  She has decreased appetite and sensation of dyspepsia with meals.  She notes that her sense of smell has not completely recovered since she had the COVID virus infection.  She has distortion of her sense of taste.  She is planning to have endoscopic procedures for evaluation of the weight loss.  The vitamin D was changed to 50,000 units orally once per month because of elevated serum vitamin D level.       She has several missing teeth.  The neck is supple.  The lungs are clear to auscultation.  The heart has a regular rhythm and normal heart sounds.  The abdomen is benign.  The extremities are without edema.  The musculoskeletal examination remarkable for a stooped forward posture.  She has markedly limited internal and external rotation of the right hip and mildly limited internal and external rotation of the left hip.  There is preserved flexion of both hips.  There is bony prominence of the right knee more so than the left knee with slight flexion contracture of the right knee without joint effusion.  There is tenderness to palpation over the lower back.    CT scan pelvis (7/15/2024) degenerative arthritis of both hips and avascular necrosis of the humeral head bilaterally, left more than right.  There is vacuum phenomenon in the sacroiliac joints, degenerative arthritis of the lumbar spine.  There is rectus diathesis with a small ventral hernia, diverticulosis, status post hysterectomy.    Laboratory (9/17/2024) celiac panel normal, ESR 3, TSH 1.33, free T4 0.96, ACTH 33.6, a.m. cortisol 8.4, 25-hydroxy vitamin D 126, WBC 4.9, hemoglobin 11.6, hematocrit 32.6, MCV 79, MCHC 35.6, platelets 156, BUN 18, creatinine 0.88, glucose 76, calcium 9.4, albumin 3.9, alkaline phosphatase 57, AST 14, ALT 6.    The systemic lupus erythematosus appears to be asymptomatic.  She has advanced  osteoarthritis of both hips and has avascular necrosis of both hips as well as advanced osteoarthritis of the right knee.  She has history of hemoglobin C trait, asthma, COPD, history of epilepsy, hypothyroidism, coronary artery disease, migraine headaches, hiatus hernia, ventral hernia, and decreased appetite with dyspepsia, change in sense of taste and smell.    She is to continue with plans for pulmonary evaluation and gastroenterology evaluation of weight loss and dyspepsia.  No new medications were prescribed.  She will likely need to have bilateral total hip arthroplasty.  She is to return at the next available office appointment.

## 2024-11-11 ENCOUNTER — APPOINTMENT (OUTPATIENT)
Dept: NEUROLOGY | Facility: CLINIC | Age: 69
End: 2024-11-11
Payer: MEDICARE

## 2024-11-11 VITALS
HEIGHT: 72 IN | RESPIRATION RATE: 16 BRPM | HEART RATE: 77 BPM | BODY MASS INDEX: 21.89 KG/M2 | WEIGHT: 161.6 LBS | TEMPERATURE: 97.2 F | SYSTOLIC BLOOD PRESSURE: 124 MMHG | DIASTOLIC BLOOD PRESSURE: 80 MMHG

## 2024-11-11 DIAGNOSIS — R26.81 UNSTEADY GAIT: ICD-10-CM

## 2024-11-11 DIAGNOSIS — R53.81 PHYSICAL DECONDITIONING: ICD-10-CM

## 2024-11-11 DIAGNOSIS — G45.9 TRANSIENT ISCHEMIC ATTACK: ICD-10-CM

## 2024-11-11 DIAGNOSIS — G43.009 ATYPICAL MIGRAINE: ICD-10-CM

## 2024-11-11 DIAGNOSIS — G43.109 MIGRAINE WITH AURA AND WITHOUT STATUS MIGRAINOSUS, NOT INTRACTABLE: Primary | ICD-10-CM

## 2024-11-11 PROCEDURE — 1159F MED LIST DOCD IN RCRD: CPT | Performed by: PSYCHIATRY & NEUROLOGY

## 2024-11-11 PROCEDURE — 1123F ACP DISCUSS/DSCN MKR DOCD: CPT | Performed by: PSYCHIATRY & NEUROLOGY

## 2024-11-11 PROCEDURE — 1160F RVW MEDS BY RX/DR IN RCRD: CPT | Performed by: PSYCHIATRY & NEUROLOGY

## 2024-11-11 PROCEDURE — 3008F BODY MASS INDEX DOCD: CPT | Performed by: PSYCHIATRY & NEUROLOGY

## 2024-11-11 PROCEDURE — G2211 COMPLEX E/M VISIT ADD ON: HCPCS | Performed by: PSYCHIATRY & NEUROLOGY

## 2024-11-11 PROCEDURE — 99214 OFFICE O/P EST MOD 30 MIN: CPT | Performed by: PSYCHIATRY & NEUROLOGY

## 2024-11-11 PROCEDURE — 1036F TOBACCO NON-USER: CPT | Performed by: PSYCHIATRY & NEUROLOGY

## 2024-11-11 ASSESSMENT — ENCOUNTER SYMPTOMS
LOSS OF SENSATION IN FEET: 1
DEPRESSION: 0
OCCASIONAL FEELINGS OF UNSTEADINESS: 1
HEADACHES: 1
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
## 2024-11-11 NOTE — PATIENT INSTRUCTIONS
The patient is doing very well from a neurological standpoint and is only occasionally having severe headaches.  The patient should certainly continue her Depakote at the current dose.  The patient does need a CBC and liver function test every 6 months while on this medicine.  The patient should continue her aspirin, atorvastatin and stroke risk factor modification.  The patient can use symptomatic pain medicines as needed for severe headaches.  I will give the patient Nurtec 75 mg tabs and the patient can take them when she has a severe headache.  I did warn her of the possibility of nausea, stomach pain and indigestion.  The patient should continue to use a walker to ambulate to improve her safety margin.  The patient does need to continue to follow with orthopedic surgery for her right hip and knee arthritis.  I discussed all these issues in detail with the patient and answered all their questions.  The patient will follow up with me in 1 year.

## 2024-11-14 ENCOUNTER — APPOINTMENT (OUTPATIENT)
Dept: CARDIOLOGY | Facility: CLINIC | Age: 69
End: 2024-11-14
Payer: MEDICARE

## 2024-11-14 ENCOUNTER — HOSPITAL ENCOUNTER (OUTPATIENT)
Dept: CARDIOLOGY | Facility: CLINIC | Age: 69
Discharge: HOME | End: 2024-11-14
Payer: MEDICARE

## 2024-11-14 VITALS — BODY MASS INDEX: 20.75 KG/M2 | HEIGHT: 72 IN

## 2024-11-14 DIAGNOSIS — R60.0 LOCALIZED EDEMA: ICD-10-CM

## 2024-11-14 LAB
AORTIC VALVE MEAN GRADIENT: 3 MMHG
AORTIC VALVE PEAK VELOCITY: 1.27 M/S
AV PEAK GRADIENT: 6 MMHG
AVA (PEAK VEL): 2.36 CM2
AVA (VTI): 2.44 CM2
EJECTION FRACTION APICAL 4 CHAMBER: 56.2
EJECTION FRACTION: 67 %
LEFT VENTRICLE INTERNAL DIMENSION DIASTOLE: 3.82 CM (ref 3.5–6)
LEFT VENTRICULAR OUTFLOW TRACT DIAMETER: 1.99 CM
MITRAL VALVE E/A RATIO: 0.58
RIGHT VENTRICLE FREE WALL PEAK S': 13 CM/S
RIGHT VENTRICLE PEAK SYSTOLIC PRESSURE: 28.7 MMHG
TRICUSPID ANNULAR PLANE SYSTOLIC EXCURSION: 2 CM

## 2024-11-14 PROCEDURE — 93306 TTE W/DOPPLER COMPLETE: CPT

## 2024-11-14 PROCEDURE — 93306 TTE W/DOPPLER COMPLETE: CPT | Performed by: INTERNAL MEDICINE

## 2024-11-27 ENCOUNTER — APPOINTMENT (OUTPATIENT)
Dept: PRIMARY CARE | Facility: CLINIC | Age: 69
End: 2024-11-27
Payer: MEDICARE

## 2024-11-27 VITALS
BODY MASS INDEX: 20.41 KG/M2 | DIASTOLIC BLOOD PRESSURE: 80 MMHG | HEART RATE: 71 BPM | WEIGHT: 159 LBS | OXYGEN SATURATION: 95 % | SYSTOLIC BLOOD PRESSURE: 104 MMHG

## 2024-11-27 DIAGNOSIS — E03.9 HYPOTHYROIDISM, UNSPECIFIED TYPE: Primary | ICD-10-CM

## 2024-11-27 DIAGNOSIS — J01.90 ACUTE SINUSITIS, RECURRENCE NOT SPECIFIED, UNSPECIFIED LOCATION: ICD-10-CM

## 2024-11-27 PROCEDURE — 99213 OFFICE O/P EST LOW 20 MIN: CPT | Performed by: STUDENT IN AN ORGANIZED HEALTH CARE EDUCATION/TRAINING PROGRAM

## 2024-11-27 PROCEDURE — 1123F ACP DISCUSS/DSCN MKR DOCD: CPT | Performed by: STUDENT IN AN ORGANIZED HEALTH CARE EDUCATION/TRAINING PROGRAM

## 2024-11-27 PROCEDURE — 1036F TOBACCO NON-USER: CPT | Performed by: STUDENT IN AN ORGANIZED HEALTH CARE EDUCATION/TRAINING PROGRAM

## 2024-11-27 PROCEDURE — 1159F MED LIST DOCD IN RCRD: CPT | Performed by: STUDENT IN AN ORGANIZED HEALTH CARE EDUCATION/TRAINING PROGRAM

## 2024-11-27 RX ORDER — AZITHROMYCIN 250 MG/1
TABLET, FILM COATED ORAL
Qty: 6 TABLET | Refills: 0 | Status: SHIPPED | OUTPATIENT
Start: 2024-11-27 | End: 2024-12-02

## 2024-11-27 RX ORDER — BENZONATATE 200 MG/1
200 CAPSULE ORAL 3 TIMES DAILY PRN
Qty: 42 CAPSULE | Refills: 0 | Status: SHIPPED | OUTPATIENT
Start: 2024-11-27 | End: 2024-12-27

## 2024-11-27 RX ORDER — METHYLPREDNISOLONE 4 MG/1
TABLET ORAL
Qty: 21 TABLET | Refills: 0 | Status: SHIPPED | OUTPATIENT
Start: 2024-11-27 | End: 2024-12-03

## 2024-11-27 ASSESSMENT — PATIENT HEALTH QUESTIONNAIRE - PHQ9
2. FEELING DOWN, DEPRESSED OR HOPELESS: NOT AT ALL
1. LITTLE INTEREST OR PLEASURE IN DOING THINGS: NOT AT ALL
SUM OF ALL RESPONSES TO PHQ9 QUESTIONS 1 AND 2: 0

## 2024-11-27 ASSESSMENT — ENCOUNTER SYMPTOMS: DEPRESSION: 0

## 2024-11-27 NOTE — PROGRESS NOTES
Subjective   Patient ID: Florencia Wang is a 69 y.o. female who presents for Follow-up and Sinus Problem (LAST COUPLE OF DAYS/WANTING ABX/COUGHING/PHLEGM IS YELLOW).    HPI     Cough congetion, fevers, chills, fatigue, for past few days, take smucinex with no help, sinus pressure chest congestion, otherwise feels ok    Review of Systems   All other systems reviewed and are negative.      Objective   /80 (BP Location: Right arm, Patient Position: Sitting)   Pulse 71   Wt 72.1 kg (159 lb)   SpO2 95%   BMI 20.41 kg/m²     Physical Exam  Constitutional:       Appearance: Normal appearance.   HENT:      Head: Normocephalic and atraumatic.      Right Ear: Tympanic membrane and ear canal normal.      Left Ear: Tympanic membrane and ear canal normal.      Mouth/Throat:      Mouth: Mucous membranes are moist.      Pharynx: Oropharynx is clear.   Eyes:      Extraocular Movements: Extraocular movements intact.      Conjunctiva/sclera: Conjunctivae normal.      Pupils: Pupils are equal, round, and reactive to light.   Cardiovascular:      Rate and Rhythm: Normal rate and regular rhythm.      Pulses: Normal pulses.      Heart sounds: Normal heart sounds.   Pulmonary:      Effort: Pulmonary effort is normal.      Breath sounds: Normal breath sounds.   Abdominal:      General: Abdomen is flat. Bowel sounds are normal.      Palpations: Abdomen is soft.   Musculoskeletal:         General: Normal range of motion.      Cervical back: Normal range of motion and neck supple.   Skin:     General: Skin is warm and dry.      Capillary Refill: Capillary refill takes 2 to 3 seconds.   Neurological:      General: No focal deficit present.      Mental Status: She is alert and oriented to person, place, and time. Mental status is at baseline.   Psychiatric:         Mood and Affect: Mood normal.         Behavior: Behavior normal.         Thought Content: Thought content normal.         Judgment: Judgment normal.         Assessment/Plan    1. Hypothyroidism, unspecified type (Primary)    - TSH with reflex to Free T4 if abnormal; Future    2. Acute sinusitis, recurrence not specified, unspecified location  - azithromycin (Zithromax) 250 mg tablet; Take 2 tablets (500 mg) by mouth once daily for 1 day, THEN 1 tablet (250 mg) once daily for 4 days. Take 2 tabs (500 mg) by mouth today, than 1 daily for 4 days..  Dispense: 6 tablet; Refill: 0  - methylPREDNISolone (Medrol Dospak) 4 mg tablets; Take as directed on package.  Dispense: 21 tablet; Refill: 0  - benzonatate (Tessalon) 200 mg capsule; Take 1 capsule (200 mg) by mouth 3 times a day as needed for cough. Do not crush or chew.  Dispense: 42 capsule; Refill: 0

## 2024-12-02 ENCOUNTER — TELEPHONE (OUTPATIENT)
Dept: PRIMARY CARE | Facility: CLINIC | Age: 69
End: 2024-12-02
Payer: MEDICARE

## 2024-12-02 DIAGNOSIS — J06.9 UPPER RESPIRATORY TRACT INFECTION, UNSPECIFIED TYPE: Primary | ICD-10-CM

## 2024-12-02 RX ORDER — AZITHROMYCIN 250 MG/1
TABLET, FILM COATED ORAL
Qty: 6 TABLET | Refills: 0 | Status: SHIPPED | OUTPATIENT
Start: 2024-12-02 | End: 2024-12-07

## 2024-12-02 NOTE — TELEPHONE ENCOUNTER
She calls in reporting a cough with rattling in chest   Wanting medication    [Good Fit] : fits well [Pessary Washed] : washed [H2O] : H2O [None] : no bleeding [Refit] : refit is not needed [Erythema] : no erythema [Erosion] : no evidence of erosion [Discharge] : no vaginal discharge [Infection] : no evidence of infection [FreeTextEntry1] : RS #6 [FreeTextEntry8] : routine jonathan-care

## 2024-12-02 NOTE — PROGRESS NOTES
Subjective   Reason for Visit: Florencia Wang is an 69 y.o. female here for a Medicare Wellness visit.               HPI    Patient Care Team:  Toribio Reyna DO as PCP - General (Internal Medicine)  Toribio Reyna DO as PCP - [employer]age PCP  Toribio Reyna DO as PCP - [employer]vantage PCP     Review of Systems    Objective   Vitals:  There were no vitals taken for this visit.      Physical Exam    Assessment & Plan

## 2024-12-03 DIAGNOSIS — M79.7 FIBROMYALGIA: ICD-10-CM

## 2024-12-03 DIAGNOSIS — E78.2 MIXED HYPERLIPIDEMIA: ICD-10-CM

## 2024-12-03 DIAGNOSIS — R60.9 EDEMA, UNSPECIFIED TYPE: ICD-10-CM

## 2024-12-03 RX ORDER — POTASSIUM CHLORIDE 1500 MG/1
20 TABLET, EXTENDED RELEASE ORAL DAILY
Qty: 30 TABLET | Refills: 1 | Status: SHIPPED | OUTPATIENT
Start: 2024-12-03

## 2024-12-03 RX ORDER — DULOXETIN HYDROCHLORIDE 30 MG/1
30 CAPSULE, DELAYED RELEASE ORAL DAILY
Qty: 30 CAPSULE | Refills: 11 | Status: SHIPPED | OUTPATIENT
Start: 2024-12-03 | End: 2025-12-03

## 2024-12-03 RX ORDER — ATORVASTATIN CALCIUM 20 MG/1
20 TABLET, FILM COATED ORAL DAILY
Qty: 30 TABLET | Refills: 3 | Status: SHIPPED | OUTPATIENT
Start: 2024-12-03 | End: 2025-12-03

## 2024-12-05 ENCOUNTER — TELEPHONE (OUTPATIENT)
Dept: PRIMARY CARE | Facility: CLINIC | Age: 69
End: 2024-12-05
Payer: MEDICARE

## 2024-12-05 RX ORDER — AMOXICILLIN AND CLAVULANATE POTASSIUM 875; 125 MG/1; MG/1
875 TABLET, FILM COATED ORAL 2 TIMES DAILY
Qty: 20 TABLET | Refills: 0 | Status: SHIPPED | OUTPATIENT
Start: 2024-12-05 | End: 2024-12-15

## 2024-12-05 NOTE — TELEPHONE ENCOUNTER
PT CALLING IN FINISHED her abx last night  But elsa has the cough and chest congestion/hurts from coughing and taking her breath  away  Wants more medication

## 2024-12-09 DIAGNOSIS — R07.9 CHEST PAIN, UNSPECIFIED TYPE: Primary | ICD-10-CM

## 2024-12-09 RX ORDER — NAPROXEN SODIUM 220 MG/1
81 TABLET, FILM COATED ORAL DAILY
Qty: 90 TABLET | Refills: 1 | Status: SHIPPED | OUTPATIENT
Start: 2024-12-09

## 2024-12-23 DIAGNOSIS — F41.1 GENERALIZED ANXIETY DISORDER: ICD-10-CM

## 2024-12-24 RX ORDER — DULOXETIN HYDROCHLORIDE 60 MG/1
60 CAPSULE, DELAYED RELEASE ORAL DAILY
Qty: 30 CAPSULE | Refills: 1 | Status: SHIPPED | OUTPATIENT
Start: 2024-12-24 | End: 2025-06-22

## 2025-01-10 DIAGNOSIS — M32.9 SYSTEMIC LUPUS ERYTHEMATOSUS, UNSPECIFIED SLE TYPE, UNSPECIFIED ORGAN INVOLVEMENT STATUS (MULTI): ICD-10-CM

## 2025-01-10 RX ORDER — HYDROXYCHLOROQUINE SULFATE 200 MG/1
200 TABLET, FILM COATED ORAL DAILY
Qty: 30 TABLET | Refills: 1 | Status: SHIPPED | OUTPATIENT
Start: 2025-01-10

## 2025-01-20 DIAGNOSIS — M48.061 FORAMINAL STENOSIS OF LUMBAR REGION: ICD-10-CM

## 2025-01-21 RX ORDER — TIZANIDINE 4 MG/1
4 TABLET ORAL 3 TIMES DAILY PRN
Qty: 90 TABLET | Refills: 1 | Status: SHIPPED | OUTPATIENT
Start: 2025-01-21

## 2025-01-28 DIAGNOSIS — F41.1 GENERALIZED ANXIETY DISORDER: ICD-10-CM

## 2025-01-28 DIAGNOSIS — R60.9 EDEMA, UNSPECIFIED TYPE: ICD-10-CM

## 2025-01-28 RX ORDER — DULOXETIN HYDROCHLORIDE 60 MG/1
60 CAPSULE, DELAYED RELEASE ORAL DAILY
Qty: 30 CAPSULE | Refills: 1 | Status: SHIPPED | OUTPATIENT
Start: 2025-01-28 | End: 2025-07-27

## 2025-01-28 RX ORDER — POTASSIUM CHLORIDE 1500 MG/1
20 TABLET, EXTENDED RELEASE ORAL DAILY
Qty: 30 TABLET | Refills: 1 | Status: SHIPPED | OUTPATIENT
Start: 2025-01-28

## 2025-02-07 DIAGNOSIS — G43.109 MIGRAINE WITH AURA AND WITHOUT STATUS MIGRAINOSUS, NOT INTRACTABLE: ICD-10-CM

## 2025-02-07 RX ORDER — RIMEGEPANT SULFATE 75 MG/75MG
75 TABLET, ORALLY DISINTEGRATING ORAL EVERY OTHER DAY
Qty: 6 TABLET | Refills: 1 | Status: SHIPPED | OUTPATIENT
Start: 2025-02-07

## 2025-02-17 DIAGNOSIS — F51.01 PRIMARY INSOMNIA: ICD-10-CM

## 2025-02-18 RX ORDER — ZOLPIDEM TARTRATE 10 MG/1
10 TABLET ORAL NIGHTLY
Qty: 30 TABLET | Refills: 5 | Status: SHIPPED | OUTPATIENT
Start: 2025-02-18

## 2025-02-24 ENCOUNTER — APPOINTMENT (OUTPATIENT)
Dept: PRIMARY CARE | Facility: CLINIC | Age: 70
End: 2025-02-24
Payer: MEDICARE

## 2025-02-25 DIAGNOSIS — R05.9 COUGH, UNSPECIFIED TYPE: Primary | ICD-10-CM

## 2025-02-25 DIAGNOSIS — M48.061 FORAMINAL STENOSIS OF LUMBAR REGION: ICD-10-CM

## 2025-02-25 RX ORDER — ALBUTEROL SULFATE 90 UG/1
2 INHALANT RESPIRATORY (INHALATION) EVERY 4 HOURS PRN
Qty: 8.5 G | Refills: 3 | Status: SHIPPED | OUTPATIENT
Start: 2025-02-25

## 2025-02-25 RX ORDER — TIZANIDINE 4 MG/1
4 TABLET ORAL 3 TIMES DAILY PRN
Qty: 90 TABLET | Refills: 1 | Status: SHIPPED | OUTPATIENT
Start: 2025-02-25

## 2025-03-07 DIAGNOSIS — M32.9 SYSTEMIC LUPUS ERYTHEMATOSUS, UNSPECIFIED SLE TYPE, UNSPECIFIED ORGAN INVOLVEMENT STATUS (MULTI): ICD-10-CM

## 2025-03-10 RX ORDER — HYDROXYCHLOROQUINE SULFATE 200 MG/1
200 TABLET, FILM COATED ORAL DAILY
Qty: 30 TABLET | Refills: 1 | Status: SHIPPED | OUTPATIENT
Start: 2025-03-10

## 2025-03-12 ENCOUNTER — APPOINTMENT (OUTPATIENT)
Dept: PRIMARY CARE | Facility: CLINIC | Age: 70
End: 2025-03-12
Payer: COMMERCIAL

## 2025-03-13 ENCOUNTER — OFFICE VISIT (OUTPATIENT)
Dept: PRIMARY CARE | Facility: CLINIC | Age: 70
End: 2025-03-13
Payer: MEDICARE

## 2025-03-13 VITALS
SYSTOLIC BLOOD PRESSURE: 130 MMHG | WEIGHT: 154 LBS | HEIGHT: 72 IN | DIASTOLIC BLOOD PRESSURE: 78 MMHG | BODY MASS INDEX: 20.86 KG/M2 | HEART RATE: 58 BPM | OXYGEN SATURATION: 99 %

## 2025-03-13 DIAGNOSIS — M48.061 FORAMINAL STENOSIS OF LUMBAR REGION: ICD-10-CM

## 2025-03-13 DIAGNOSIS — F12.20 CANNABIS DEPENDENCE: ICD-10-CM

## 2025-03-13 DIAGNOSIS — J96.10 CHRONIC RESPIRATORY FAILURE, UNSPECIFIED WHETHER WITH HYPOXIA OR HYPERCAPNIA: ICD-10-CM

## 2025-03-13 DIAGNOSIS — D57.1 SICKLE-CELL DISEASE WITHOUT CRISIS (MULTI): ICD-10-CM

## 2025-03-13 DIAGNOSIS — D64.9 ANEMIA, UNSPECIFIED TYPE: ICD-10-CM

## 2025-03-13 DIAGNOSIS — Z13.31 DEPRESSION SCREEN: ICD-10-CM

## 2025-03-13 DIAGNOSIS — Z51.81 ENCOUNTER FOR THERAPEUTIC DRUG LEVEL MONITORING: ICD-10-CM

## 2025-03-13 DIAGNOSIS — Z00.00 ROUTINE GENERAL MEDICAL EXAMINATION AT HEALTH CARE FACILITY: Primary | ICD-10-CM

## 2025-03-13 DIAGNOSIS — Z00.00 ROUTINE GENERAL MEDICAL EXAMINATION AT A HEALTH CARE FACILITY: ICD-10-CM

## 2025-03-13 DIAGNOSIS — Z71.89 ACP (ADVANCE CARE PLANNING): ICD-10-CM

## 2025-03-13 DIAGNOSIS — Z12.31 ENCOUNTER FOR SCREENING MAMMOGRAM FOR MALIGNANT NEOPLASM OF BREAST: ICD-10-CM

## 2025-03-13 DIAGNOSIS — F51.01 PRIMARY INSOMNIA: ICD-10-CM

## 2025-03-13 RX ORDER — TIZANIDINE 4 MG/1
8 TABLET ORAL EVERY 8 HOURS PRN
Qty: 90 TABLET | Refills: 0 | Status: SHIPPED | OUTPATIENT
Start: 2025-03-13

## 2025-03-13 RX ORDER — ZOLPIDEM TARTRATE 10 MG/1
10 TABLET ORAL NIGHTLY
Qty: 30 TABLET | Refills: 5 | Status: SHIPPED | OUTPATIENT
Start: 2025-03-13

## 2025-03-13 ASSESSMENT — ACTIVITIES OF DAILY LIVING (ADL)
GROCERY_SHOPPING: INDEPENDENT
TAKING_MEDICATION: INDEPENDENT
MANAGING_FINANCES: INDEPENDENT
BATHING: INDEPENDENT
DOING_HOUSEWORK: INDEPENDENT
DRESSING: INDEPENDENT

## 2025-03-13 ASSESSMENT — PATIENT HEALTH QUESTIONNAIRE - PHQ9
SUM OF ALL RESPONSES TO PHQ9 QUESTIONS 1 AND 2: 0
1. LITTLE INTEREST OR PLEASURE IN DOING THINGS: NOT AT ALL
2. FEELING DOWN, DEPRESSED OR HOPELESS: NOT AT ALL

## 2025-03-13 ASSESSMENT — ENCOUNTER SYMPTOMS
DEPRESSION: 0
LOSS OF SENSATION IN FEET: 0
OCCASIONAL FEELINGS OF UNSTEADINESS: 0

## 2025-03-13 NOTE — PROGRESS NOTES
"Subjective   Patient ID: Florencia Wang is a 69 y.o. female who presents for Medicare Annual Wellness Visit Subsequent (fasting).    HPI     Review of Systems    Objective   /78 (BP Location: Left arm, Patient Position: Sitting, BP Cuff Size: Small adult)   Pulse 58   Ht 1.88 m (6' 2\")   Wt 69.9 kg (154 lb)   SpO2 99%   BMI 19.77 kg/m²     Physical Exam    Assessment/Plan          "

## 2025-03-13 NOTE — PROGRESS NOTES
Subjective   Reason for Visit: Florencia Wang is an 69 y.o. female here for a Medicare Wellness visit.          Reviewed all medications by prescribing practitioner or clinical pharmacist (such as prescriptions, OTCs, herbal therapies and supplements) and documented in the medical record.    HPI    SLE: Hydroxychloroquine 200 mg. Follows with Dr. Hagen.      Sickle Cell: Last crisis was 30 years ago requiring hospitalization.      COPD/Asthma: On Advair, albuterol. Having to take rescue inhaler 1-2 times a week. No nightly wheezing or coughing     ROM: Not on CPAP, but uses bedside oxygen for periods of apnea.     Uncontrolled Migraines: Depakote 250mg BID (TID as needed). Follows with Dr. Paulson. Most recent one was yesterday.      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 Follows with Dr. Paulson.     CVA/TIA: On ASA/statin     CAD: Follows with Dr. Hawk. Last visit was 09/18.      Anxiety:Takes Ambien nightly     OARRS:  No data recorded  I have personally reviewed the OARRS report for Florencia Wang. I have considered the risks of abuse, dependence, addiction and diversion    Is the patient prescribed a combination of a benzodiazepine and opioid?  No    Last Urine Drug Screen / ordered today: Yes  No results found for this or any previous visit (from the past 8760 hours).  Results are as expected.           Controlled Substance Agreement:  Date of the Last Agreement: 2025  Reviewed Controlled Substance Agreement including but not limited to the benefits, risks, and alternatives to treatment with a Controlled Substance medication(s).    Sleep Aids:   What is the patient's goal of therapy? sleeping  Is this being achieved with current treatment? yes    Activities of Daily Living:   Is your overall impression that this patient is benefiting (symptom reduction outweighs side effects) from sleep aid therapy? Yes     1. Physical Functioning: Better  2. Family  "Relationship: Better  3. Social Relationship: Better  4. Mood: Better  5. Sleep Patterns: Better  6. Overall Function: Better    Patient Care Team:  Toribio Reyna, DO as PCP - General (Internal Medicine)  Toribio Reyna, DO as PCP - [facility]a Medicare Advantage PCP  Toribio Reyna, DO as PCP - [place] Medicare Advantage PCP     Review of Systems   All other systems reviewed and are negative.      Objective   Vitals:  Ht 1.88 m (6' 2\")   Wt 69.9 kg (154 lb)   BMI 19.77 kg/m²       Physical Exam  Constitutional:       Appearance: Normal appearance.   HENT:      Head: Normocephalic and atraumatic.      Right Ear: Tympanic membrane and ear canal normal.      Left Ear: Tympanic membrane and ear canal normal.      Mouth/Throat:      Mouth: Mucous membranes are moist.      Pharynx: Oropharynx is clear.   Eyes:      Extraocular Movements: Extraocular movements intact.      Conjunctiva/sclera: Conjunctivae normal.      Pupils: Pupils are equal, round, and reactive to light.   Cardiovascular:      Rate and Rhythm: Normal rate and regular rhythm.      Pulses: Normal pulses.      Heart sounds: Normal heart sounds.   Pulmonary:      Effort: Pulmonary effort is normal.      Breath sounds: Normal breath sounds.   Abdominal:      General: Abdomen is flat. Bowel sounds are normal.      Palpations: Abdomen is soft.   Musculoskeletal:         General: Normal range of motion.      Cervical back: Normal range of motion and neck supple.   Skin:     General: Skin is warm and dry.      Capillary Refill: Capillary refill takes 2 to 3 seconds.   Neurological:      General: No focal deficit present.      Mental Status: She is alert and oriented to person, place, and time. Mental status is at baseline.   Psychiatric:         Mood and Affect: Mood normal.         Behavior: Behavior normal.         Thought Content: Thought content normal.         Judgment: Judgment normal.       Assessment & Plan  Primary insomnia         Foraminal stenosis of lumbar region     "     Routine general medical examination at a health care facility         Routine general medical examination at health care facility    Orders:  •  1 Year Follow Up In Primary Care - Wellness Exam; Future    Sickle-cell disease without crisis (Multi)         Chronic respiratory failure, unspecified whether with hypoxia or hypercapnia         Cannabis dependence         Depression screen         ACP (advance care planning)          1. Primary insomnia  Stable  On ambien doing well  Csa on file  Uds today  Follow up 6 months    2. Foraminal stenosis of lumbar region  Stable, no issues  Some back wyatt, uses a walker    3. Routine general medical examination at a health care facility  Labs orderd    4. Routine general medical examination at health care facility (Primary)    - 1 Year Follow Up In Primary Care - Wellness Exam; Future    5. Sickle-cell disease without crisis (Multi)  Stable no issues    6. Chronic respiratory failure, unspecified whether with hypoxia or hypercapnia  Stable not on oxygn ebreathing fine    7. Cannabis dependence  remission    Depression  - denies      Tobacco/Alcohol/Opioid use, as well as Illicit Drug Use was screened for/reviewed and documented in Social Documentation section of the chart and medication list as appropriate    Depression Screening  Depression screening completed using the PHQ-2 questions, with results documented in the chart/encounter (~15min).  (See Rooming Screening section for documentation, or progress note for additional information)    Cardiac Risk Assessment  The ASCVD Risk score (Laura BUSH, et al., 2019) failed to calculate for the following reasons:    Risk score cannot be calculated because patient has a medical history suggesting prior/existing ASCVD  Cardiovascular risk was discussed and, if needed, lifestyle modifications recommended, including nutritional choices, exercise, and elimination of habits contributing to risk. We agreed on a plan to reduce the  current cardiovascular risk based on above discussion as needed.     Aspirin use/disuse was discussed and documented in the Problem List of the medical record (under Cardiac Risk Counseling) after reviewing the updated guidelines below:  Consider low dose Aspirin ( mg) use if the benefit for cardiovascular disease prevention outweighs risk for bleeding complications.   In general, low dose ASA should be considered:  In patients WITHOUT prior MI/stroke/PAD (primary prevention):   a. Age <60: Use if 10-year cardiovascular disease risk >20%, with discussion of risks and benefits with patient  b. Age 60-<70: Use if 10-year cardiovascular disease risk >20% and low bleeding (e.g., gastrointestinal) risk, with discussion of risks and benefits with patient  c. Age >=70: Do not use    In patients WITH prior MI/stroke/PAD (secondary prevention):   Generally use unless extremely high bleeding (e.g., gastrointenstinal) risk, with discussion of risks and benefits with patient    Advance Directives Discussion  Advanced Care Planning (including a Living Will, Healthcare POA, as well as specific end of life choices and/or directives), was discussed with the patient and/or surrogate, voluntarily, and details of that discussion documented in the Problem List (under Advanced Directives Discussion) of the medical record.    (~16 min spent discussing above)     During the course of the visit the patient was educated and counseled about age appropriate screening and preventive services.   Completed preventive screenings were documented in the chart (see Routine Health Maintenance in Problem List) and orders were placed for outstanding screenings/procedures as documented in the Assessment and Plan.

## 2025-03-14 LAB
ALBUMIN SERPL-MCNC: 4.1 G/DL (ref 3.6–5.1)
ALP SERPL-CCNC: 63 U/L (ref 37–153)
ALT SERPL-CCNC: 8 U/L (ref 6–29)
ANION GAP SERPL CALCULATED.4IONS-SCNC: 9 MMOL/L (CALC) (ref 7–17)
AST SERPL-CCNC: 18 U/L (ref 10–35)
BASOPHILS # BLD AUTO: 10 CELLS/UL (ref 0–200)
BASOPHILS NFR BLD AUTO: 0.2 %
BILIRUB SERPL-MCNC: 0.9 MG/DL (ref 0.2–1.2)
BUN SERPL-MCNC: 14 MG/DL (ref 7–25)
CALCIUM SERPL-MCNC: 9.4 MG/DL (ref 8.6–10.4)
CHLORIDE SERPL-SCNC: 104 MMOL/L (ref 98–110)
CHOLEST SERPL-MCNC: 146 MG/DL
CHOLEST/HDLC SERPL: 3.2 (CALC)
CO2 SERPL-SCNC: 27 MMOL/L (ref 20–32)
CREAT SERPL-MCNC: 0.86 MG/DL (ref 0.6–1)
EGFRCR SERPLBLD CKD-EPI 2021: 72 ML/MIN/1.73M2
EOSINOPHIL # BLD AUTO: 60 CELLS/UL (ref 15–500)
EOSINOPHIL NFR BLD AUTO: 1.2 %
ERYTHROCYTE [DISTWIDTH] IN BLOOD BY AUTOMATED COUNT: 17 % (ref 11–15)
EST. AVERAGE GLUCOSE BLD GHB EST-MCNC: 103 MG/DL
EST. AVERAGE GLUCOSE BLD GHB EST-SCNC: 5.7 MMOL/L
GLUCOSE SERPL-MCNC: 81 MG/DL (ref 65–99)
HBA1C MFR BLD: 5.2 % OF TOTAL HGB
HCT VFR BLD AUTO: 35.4 % (ref 35–45)
HDLC SERPL-MCNC: 46 MG/DL
HGB BLD-MCNC: 11.7 G/DL (ref 11.7–15.5)
LDLC SERPL CALC-MCNC: 82 MG/DL (CALC)
LYMPHOCYTES # BLD AUTO: 2950 CELLS/UL (ref 850–3900)
LYMPHOCYTES NFR BLD AUTO: 59 %
MCH RBC QN AUTO: 28.1 PG (ref 27–33)
MCHC RBC AUTO-ENTMCNC: 33.1 G/DL (ref 32–36)
MCV RBC AUTO: 84.9 FL (ref 80–100)
MONOCYTES # BLD AUTO: 370 CELLS/UL (ref 200–950)
MONOCYTES NFR BLD AUTO: 7.4 %
NEUTROPHILS # BLD AUTO: 1610 CELLS/UL (ref 1500–7800)
NEUTROPHILS NFR BLD AUTO: 32.2 %
NONHDLC SERPL-MCNC: 100 MG/DL (CALC)
PLATELET # BLD AUTO: 141 THOUSAND/UL (ref 140–400)
PMV BLD REES-ECKER: 12.5 FL (ref 7.5–12.5)
POTASSIUM SERPL-SCNC: 5 MMOL/L (ref 3.5–5.3)
PROT SERPL-MCNC: 7.1 G/DL (ref 6.1–8.1)
RBC # BLD AUTO: 4.17 MILLION/UL (ref 3.8–5.1)
SODIUM SERPL-SCNC: 140 MMOL/L (ref 135–146)
T4 FREE SERPL-MCNC: 1 NG/DL (ref 0.8–1.8)
TRIGL SERPL-MCNC: 89 MG/DL
TSH SERPL-ACNC: 18.31 MIU/L (ref 0.4–4.5)
WBC # BLD AUTO: 5 THOUSAND/UL (ref 3.8–10.8)

## 2025-03-20 DIAGNOSIS — M32.9 SYSTEMIC LUPUS ERYTHEMATOSUS, UNSPECIFIED SLE TYPE, UNSPECIFIED ORGAN INVOLVEMENT STATUS (MULTI): Primary | ICD-10-CM

## 2025-03-21 RX ORDER — PREDNISONE 10 MG/1
10 TABLET ORAL EVERY OTHER DAY
Qty: 15 TABLET | Refills: 1 | Status: SHIPPED | OUTPATIENT
Start: 2025-03-21

## 2025-03-25 DIAGNOSIS — F41.1 GENERALIZED ANXIETY DISORDER: ICD-10-CM

## 2025-03-25 DIAGNOSIS — R60.9 EDEMA, UNSPECIFIED TYPE: ICD-10-CM

## 2025-03-26 RX ORDER — DULOXETIN HYDROCHLORIDE 60 MG/1
60 CAPSULE, DELAYED RELEASE ORAL DAILY
Qty: 30 CAPSULE | Refills: 1 | Status: SHIPPED | OUTPATIENT
Start: 2025-03-26 | End: 2025-09-22

## 2025-03-26 RX ORDER — POTASSIUM CHLORIDE 1500 MG/1
20 TABLET, EXTENDED RELEASE ORAL DAILY
Qty: 30 TABLET | Refills: 1 | Status: SHIPPED | OUTPATIENT
Start: 2025-03-26

## 2025-04-03 DIAGNOSIS — G43.109 MIGRAINE WITH AURA AND WITHOUT STATUS MIGRAINOSUS, NOT INTRACTABLE: ICD-10-CM

## 2025-04-04 RX ORDER — RIMEGEPANT SULFATE 75 MG/75MG
75 TABLET, ORALLY DISINTEGRATING ORAL EVERY OTHER DAY
Qty: 10 TABLET | Refills: 1 | Status: SHIPPED | OUTPATIENT
Start: 2025-04-04

## 2025-04-21 DIAGNOSIS — E78.2 MIXED HYPERLIPIDEMIA: ICD-10-CM

## 2025-04-22 DIAGNOSIS — M32.9 SYSTEMIC LUPUS ERYTHEMATOSUS, UNSPECIFIED SLE TYPE, UNSPECIFIED ORGAN INVOLVEMENT STATUS (MULTI): ICD-10-CM

## 2025-04-22 RX ORDER — PREDNISONE 10 MG/1
10 TABLET ORAL EVERY OTHER DAY
Qty: 15 TABLET | Refills: 2 | Status: SHIPPED | OUTPATIENT
Start: 2025-04-22

## 2025-04-22 RX ORDER — ATORVASTATIN CALCIUM 20 MG/1
20 TABLET, FILM COATED ORAL DAILY
Qty: 90 TABLET | Refills: 3 | Status: SHIPPED | OUTPATIENT
Start: 2025-04-22 | End: 2026-04-22

## 2025-04-30 RX ORDER — AMINOPHYLLINE 25 MG/ML
125 INJECTION, SOLUTION INTRAVENOUS ONCE AS NEEDED
Status: CANCELLED | OUTPATIENT
Start: 2025-04-30

## 2025-04-30 RX ORDER — REGADENOSON 0.08 MG/ML
0.4 INJECTION, SOLUTION INTRAVENOUS
Status: CANCELLED | OUTPATIENT
Start: 2025-04-30

## 2025-05-01 DIAGNOSIS — M32.9 SYSTEMIC LUPUS ERYTHEMATOSUS, UNSPECIFIED SLE TYPE, UNSPECIFIED ORGAN INVOLVEMENT STATUS (MULTI): ICD-10-CM

## 2025-05-01 DIAGNOSIS — R07.9 CHEST PAIN, UNSPECIFIED TYPE: ICD-10-CM

## 2025-05-01 RX ORDER — HYDROXYCHLOROQUINE SULFATE 200 MG/1
200 TABLET, FILM COATED ORAL DAILY
Qty: 30 TABLET | Refills: 2 | Status: SHIPPED | OUTPATIENT
Start: 2025-05-01

## 2025-05-01 RX ORDER — NAPROXEN SODIUM 220 MG/1
81 TABLET, FILM COATED ORAL DAILY
Qty: 90 TABLET | Refills: 2 | Status: SHIPPED | OUTPATIENT
Start: 2025-05-01

## 2025-05-09 ENCOUNTER — HOSPITAL ENCOUNTER (OUTPATIENT)
Dept: CARDIOLOGY | Facility: HOSPITAL | Age: 70
Discharge: HOME | End: 2025-05-09
Payer: MEDICARE

## 2025-05-09 ENCOUNTER — HOSPITAL ENCOUNTER (OUTPATIENT)
Dept: RADIOLOGY | Facility: HOSPITAL | Age: 70
Discharge: HOME | End: 2025-05-09
Payer: MEDICARE

## 2025-05-09 DIAGNOSIS — R07.9 CHEST PAIN, UNSPECIFIED: ICD-10-CM

## 2025-05-09 PROCEDURE — 93017 CV STRESS TEST TRACING ONLY: CPT

## 2025-05-09 PROCEDURE — 3430000001 HC RX 343 DIAGNOSTIC RADIOPHARMACEUTICALS: Performed by: INTERNAL MEDICINE

## 2025-05-09 PROCEDURE — A9502 TC99M TETROFOSMIN: HCPCS | Performed by: INTERNAL MEDICINE

## 2025-05-09 PROCEDURE — 93016 CV STRESS TEST SUPVJ ONLY: CPT | Performed by: STUDENT IN AN ORGANIZED HEALTH CARE EDUCATION/TRAINING PROGRAM

## 2025-05-09 PROCEDURE — 78452 HT MUSCLE IMAGE SPECT MULT: CPT

## 2025-05-09 PROCEDURE — 93018 CV STRESS TEST I&R ONLY: CPT | Performed by: STUDENT IN AN ORGANIZED HEALTH CARE EDUCATION/TRAINING PROGRAM

## 2025-05-09 PROCEDURE — 2500000004 HC RX 250 GENERAL PHARMACY W/ HCPCS (ALT 636 FOR OP/ED): Performed by: INTERNAL MEDICINE

## 2025-05-09 RX ORDER — REGADENOSON 0.08 MG/ML
0.4 INJECTION, SOLUTION INTRAVENOUS
Status: COMPLETED | OUTPATIENT
Start: 2025-05-09 | End: 2025-05-09

## 2025-05-09 RX ADMIN — TETROFOSMIN 10.9 MILLICURIE: 0.23 INJECTION, POWDER, LYOPHILIZED, FOR SOLUTION INTRAVENOUS at 10:38

## 2025-05-09 RX ADMIN — TETROFOSMIN 35.3 MILLICURIE: 0.23 INJECTION, POWDER, LYOPHILIZED, FOR SOLUTION INTRAVENOUS at 12:05

## 2025-05-09 RX ADMIN — REGADENOSON 0.4 MG: 0.08 INJECTION, SOLUTION INTRAVENOUS at 12:09

## 2025-05-14 ENCOUNTER — APPOINTMENT (OUTPATIENT)
Dept: RHEUMATOLOGY | Facility: CLINIC | Age: 70
End: 2025-05-14
Payer: MEDICARE

## 2025-05-14 VITALS
WEIGHT: 152 LBS | SYSTOLIC BLOOD PRESSURE: 88 MMHG | HEIGHT: 69 IN | DIASTOLIC BLOOD PRESSURE: 59 MMHG | HEART RATE: 72 BPM | BODY MASS INDEX: 22.51 KG/M2

## 2025-05-14 DIAGNOSIS — M16.11 PRIMARY OSTEOARTHRITIS OF RIGHT HIP: Primary | ICD-10-CM

## 2025-05-14 DIAGNOSIS — M32.9 SYSTEMIC LUPUS ERYTHEMATOSUS, UNSPECIFIED SLE TYPE, UNSPECIFIED ORGAN INVOLVEMENT STATUS (MULTI): ICD-10-CM

## 2025-05-14 DIAGNOSIS — E03.9 HYPOTHYROIDISM, UNSPECIFIED TYPE: ICD-10-CM

## 2025-05-14 DIAGNOSIS — T14.8XXA BRUISE: ICD-10-CM

## 2025-05-14 PROCEDURE — 1125F AMNT PAIN NOTED PAIN PRSNT: CPT | Performed by: INTERNAL MEDICINE

## 2025-05-14 PROCEDURE — 3008F BODY MASS INDEX DOCD: CPT | Performed by: INTERNAL MEDICINE

## 2025-05-14 PROCEDURE — 1036F TOBACCO NON-USER: CPT | Performed by: INTERNAL MEDICINE

## 2025-05-14 PROCEDURE — 99214 OFFICE O/P EST MOD 30 MIN: CPT | Performed by: INTERNAL MEDICINE

## 2025-05-14 PROCEDURE — 1159F MED LIST DOCD IN RCRD: CPT | Performed by: INTERNAL MEDICINE

## 2025-05-14 ASSESSMENT — PAIN SCALES - GENERAL: PAINLEVEL_OUTOF10: 4

## 2025-05-14 NOTE — PROGRESS NOTES
Prenatal prenatal visit    Prenatal care: O Rh Positive.   Fetal kick counts reviewed  Labor precautions discussed  Eighth Pregnancy Problems (from 10/21/20 to present)     Problem Noted Resolved    Short interval between pregnancies complicating pregnancy, antepartum 2020 by Gloria Boyce MD No    Priority:  Low      Grand multiparity with current pregnancy, antepartum 10/21/2020 by GRISELDA Dorsey No    Priority:  Low      Overview Addendum 2021 12:54 PM by GRISELDA Dorsey     Starting baby Aspirin at 12 weeks.  Desires post partum contraception- considering Nexplanon or Mirena         Sjogren's syndrome (CMS/[facility]) 10/21/2020 by GRISELDA Dorsey No    Priority:  Low      Overview Signed 2020 10:29 AM by Jessica Soriano NP     Under the care of Dr. Walters at [facility], last seen 10/2020         Lupus (CMS/[facility]) 10/21/2020 by GRISELDA Dorsey No    Priority:  Low      Overview Addendum 2/10/2021  2:06 PM by GRISELDA Dorsey     Under the care of Dr. Walters at [facility], last seen 10/2020.     testing beginning at 32 weeks.  Add growth US for next visit  Plan on 39w IOL         Prenatal care of multigravida, antepartum 10/21/2020 by GRISELDA Dorsey No    Priority:  Low      Obesity (BMI 30-39.9) 10/21/2020 by GRISELDA Dorsey No    Priority:  Low      Overview Signed 2020 10:32 AM by Jessica Soriano NP     Starting baby aspirin at 12 weeks.         Anemia of mother in pregnancy, antepartum 2020 by Eyal Caballero MD No    Priority:  Low      Overview Addendum 2021 11:07 AM by Eyal Caballero MD     28w: 9.5  Check CBC,  She complains of generalized pain.  She especially notes pain in her knees causing difficulty with walking.  She has unintentional weight loss.  She has a decreased appetite at least some of which she attributes to to have dental work done.  She notes some patches of dry skin on the distal thighs.  She is on thyroid hormone replacement therapy.    She has lipodystrophy of the face.  The lungs, heart, abdomen, and extremities are benign.  The musculoskeletal examination does not show any joint effusions.  There is markedly limited range of motion of the right hip with pain.  There is slightly limited range of motion of the left hip.  There is mild bony prominence of the knees with mild pain on passive range of motion of the right knee.  She stands with a stooped forward posture and uses a rolling walker to ambulate.    Laboratory (3/13/2025) TSH 18.31, WBC 5.0, hemoglobin 11.7, hematocrit 35.4, MCV 84.9, MCHC 33.1, platelets 141, BUN 14, creatinine 0.86, glucose 81, sodium 140, potassium 5.0, chloride 104, bicarbonate 27, alkaline phosphatase 63, AST 18, ALT 8, calcium 9.4, albumin 4.1.    Nuclear medicine stress test (5/9/2025) normal.    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  History of hemoglobin C trait, asthma, COPD, history of epilepsy, hypothyroidism on thyroid hormone replacement therapy with elevated TSH, coronary artery disease, migraine headaches, hiatus hernia, ventral hernia, weight loss.    She is referred to orthopedics for evaluation of possible hip and/or knee replacement surgery.  She is to continue with plans for follow-up with cardiology and pulmonary medicine as well as with a dentist.  She is to return at next available office appointment.   ferritin today. Hx of needing venofer                   RTO in 1 weeks  Eyal Caballero MD

## 2025-05-21 DIAGNOSIS — M48.061 FORAMINAL STENOSIS OF LUMBAR REGION: ICD-10-CM

## 2025-05-21 DIAGNOSIS — F41.1 GENERALIZED ANXIETY DISORDER: ICD-10-CM

## 2025-05-21 DIAGNOSIS — R60.9 EDEMA, UNSPECIFIED TYPE: ICD-10-CM

## 2025-05-21 RX ORDER — TIZANIDINE 4 MG/1
4 TABLET ORAL 3 TIMES DAILY PRN
Qty: 90 TABLET | Refills: 2 | Status: SHIPPED | OUTPATIENT
Start: 2025-05-21

## 2025-05-22 RX ORDER — DULOXETIN HYDROCHLORIDE 60 MG/1
60 CAPSULE, DELAYED RELEASE ORAL DAILY
Qty: 30 CAPSULE | Refills: 2 | Status: SHIPPED | OUTPATIENT
Start: 2025-05-22 | End: 2025-11-18

## 2025-05-22 RX ORDER — POTASSIUM CHLORIDE 1500 MG/1
20 TABLET, EXTENDED RELEASE ORAL DAILY
Qty: 30 TABLET | Refills: 2 | Status: SHIPPED | OUTPATIENT
Start: 2025-05-22

## 2025-05-27 ENCOUNTER — APPOINTMENT (OUTPATIENT)
Dept: PRIMARY CARE | Facility: CLINIC | Age: 70
End: 2025-05-27
Payer: MEDICARE

## 2025-05-27 VITALS
HEART RATE: 72 BPM | BODY MASS INDEX: 22.48 KG/M2 | DIASTOLIC BLOOD PRESSURE: 80 MMHG | SYSTOLIC BLOOD PRESSURE: 138 MMHG | OXYGEN SATURATION: 98 % | WEIGHT: 150 LBS

## 2025-05-27 DIAGNOSIS — M05.79 RHEUMATOID ARTHRITIS INVOLVING MULTIPLE SITES WITH POSITIVE RHEUMATOID FACTOR (MULTI): ICD-10-CM

## 2025-05-27 DIAGNOSIS — I50.22 CHRONIC SYSTOLIC HEART FAILURE: ICD-10-CM

## 2025-05-27 DIAGNOSIS — F51.01 PRIMARY INSOMNIA: ICD-10-CM

## 2025-05-27 DIAGNOSIS — T78.40XA ALLERGY, INITIAL ENCOUNTER: Primary | ICD-10-CM

## 2025-05-27 DIAGNOSIS — E03.2 HYPOTHYROIDISM DUE TO MEDICATION: ICD-10-CM

## 2025-05-27 DIAGNOSIS — R56.9 SEIZURE (MULTI): ICD-10-CM

## 2025-05-27 DIAGNOSIS — Z79.899 CONTROLLED SUBSTANCE AGREEMENT SIGNED: ICD-10-CM

## 2025-05-27 DIAGNOSIS — J44.9 CHRONIC OBSTRUCTIVE PULMONARY DISEASE, UNSPECIFIED COPD TYPE (MULTI): ICD-10-CM

## 2025-05-27 PROBLEM — D57.1 SICKLE-CELL DISEASE WITHOUT CRISIS (MULTI): Status: RESOLVED | Noted: 2025-03-13 | Resolved: 2025-05-27

## 2025-05-27 PROCEDURE — 1036F TOBACCO NON-USER: CPT | Performed by: STUDENT IN AN ORGANIZED HEALTH CARE EDUCATION/TRAINING PROGRAM

## 2025-05-27 PROCEDURE — 1159F MED LIST DOCD IN RCRD: CPT | Performed by: STUDENT IN AN ORGANIZED HEALTH CARE EDUCATION/TRAINING PROGRAM

## 2025-05-27 PROCEDURE — 99214 OFFICE O/P EST MOD 30 MIN: CPT | Performed by: STUDENT IN AN ORGANIZED HEALTH CARE EDUCATION/TRAINING PROGRAM

## 2025-05-27 RX ORDER — ZOLPIDEM TARTRATE 10 MG/1
10 TABLET ORAL NIGHTLY
Qty: 30 TABLET | Refills: 5 | Status: SHIPPED | OUTPATIENT
Start: 2025-05-27

## 2025-05-27 RX ORDER — FLUTICASONE PROPIONATE 50 MCG
1 SPRAY, SUSPENSION (ML) NASAL DAILY
Qty: 16 G | Refills: 5 | Status: SHIPPED | OUTPATIENT
Start: 2025-05-27 | End: 2026-05-27

## 2025-05-27 RX ORDER — HYDROCORTISONE 25 MG/G
CREAM TOPICAL 2 TIMES DAILY PRN
Qty: 30 G | Refills: 5 | Status: SHIPPED | OUTPATIENT
Start: 2025-05-27 | End: 2026-05-27

## 2025-05-27 ASSESSMENT — PATIENT HEALTH QUESTIONNAIRE - PHQ9
SUM OF ALL RESPONSES TO PHQ9 QUESTIONS 1 AND 2: 0
2. FEELING DOWN, DEPRESSED OR HOPELESS: NOT AT ALL
1. LITTLE INTEREST OR PLEASURE IN DOING THINGS: NOT AT ALL

## 2025-05-27 ASSESSMENT — ENCOUNTER SYMPTOMS: DEPRESSION: 0

## 2025-05-27 NOTE — PROGRESS NOTES
Subjective   Patient ID: Florencia Wang is a 69 y.o. female who presents for Follow-up (Constant back itching /Comes & goes/).    HPI     SLE: Hydroxychloroquine 200 mg. Follows with Dr. Hagen.      Sickle Cell: Last crisis was 30 years ago requiring hospitalization.      COPD/Asthma: On Advair, albuterol. Having to take rescue inhaler 1-2 times a week. No nightly wheezing or coughing     ROM: Not on CPAP, but uses bedside oxygen for periods of apnea.     Uncontrolled Migraines: Depakote 250mg BID (TID as needed). Follows with Dr. Paulson. Most recent one was yesterday.      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 Follows with Dr. Paulson.     CVA/TIA: On ASA/statin     CAD: Follows with Dr. Hawk. Last visit was 09/18.      Anxiety:Takes Ambien nightly    OARRS:  No data recorded  I have personally reviewed the OARRS report for Florencia Wang. I have considered the risks of abuse, dependence, addiction and diversion    Is the patient prescribed a combination of a benzodiazepine and opioid?  No    Last Urine Drug Screen / ordered today: Yes  No results found for this or any previous visit (from the past 8760 hours).  Results are as expected.         Controlled Substance Agreement:  Date of the Last Agreement: 2025  Reviewed Controlled Substance Agreement including but not limited to the benefits, risks, and alternatives to treatment with a Controlled Substance medication(s).    Sleep Aids:   What is the patient's goal of therapy? sleep  Is this being achieved with current treatment? yes    Activities of Daily Living:   Is your overall impression that this patient is benefiting (symptom reduction outweighs side effects) from sleep aid therapy? Yes     1. Physical Functioning: Better  2. Family Relationship: Better  3. Social Relationship: Better  4. Mood: Better  5. Sleep Patterns: Better  6. Overall Function: Better         Review of Systems   All other systems  reviewed and are negative.      Objective   /80 (BP Location: Right arm, Patient Position: Sitting, BP Cuff Size: Small adult)   Pulse 72   Wt 68 kg (150 lb)   SpO2 98%   BMI 22.48 kg/m²     Physical Exam  Constitutional:       Appearance: Normal appearance.   HENT:      Head: Normocephalic and atraumatic.      Right Ear: Tympanic membrane and ear canal normal.      Left Ear: Tympanic membrane and ear canal normal.      Mouth/Throat:      Mouth: Mucous membranes are moist.      Pharynx: Oropharynx is clear.   Eyes:      Extraocular Movements: Extraocular movements intact.      Conjunctiva/sclera: Conjunctivae normal.      Pupils: Pupils are equal, round, and reactive to light.   Cardiovascular:      Rate and Rhythm: Normal rate and regular rhythm.      Pulses: Normal pulses.      Heart sounds: Normal heart sounds.   Pulmonary:      Effort: Pulmonary effort is normal.      Breath sounds: Normal breath sounds.   Abdominal:      General: Abdomen is flat. Bowel sounds are normal.      Palpations: Abdomen is soft.   Musculoskeletal:         General: Normal range of motion.      Cervical back: Normal range of motion and neck supple.   Skin:     General: Skin is warm and dry.      Capillary Refill: Capillary refill takes 2 to 3 seconds.   Neurological:      General: No focal deficit present.      Mental Status: She is alert and oriented to person, place, and time. Mental status is at baseline.   Psychiatric:         Mood and Affect: Mood normal.         Behavior: Behavior normal.         Thought Content: Thought content normal.         Judgment: Judgment normal.       Assessment/Plan   1. Primary insomnia  Sleep stable  Oarrs relived  Uds tdoay  Csa on file  refill    2. Rheumatoid arthritis involving multiple sites with positive rheumatoid factor (Multi)  Stable  No issue  Sfollow rheum    3. Chronic systolic heart failure  Stable  No breathing issues      4. Chronic obstructive pulmonary disease, unspecified  COPD type (Multi)  Stable no wheezing     5. Seizure (Multi)  Stable  No recurrence doing well

## 2025-05-27 NOTE — ADDENDUM NOTE
Addended by: LEI CHAVEZ on: 5/27/2025 09:53 AM     Modules accepted: Orders    
all other ROS negative except as per HPI

## 2025-05-29 LAB
AMPHETAMINES UR QL: NEGATIVE NG/ML
ANA SER QL IF: NEGATIVE
BARBITURATES UR QL: NEGATIVE NG/ML
BENZODIAZ UR QL: NEGATIVE NG/ML
BZE UR QL: NEGATIVE NG/ML
CREAT UR-MCNC: 115.2 MG/DL
DRUG SCREEN COMMENT UR-IMP: ABNORMAL
FENTANYL UR QL SCN: NEGATIVE NG/ML
METHADONE UR QL: NEGATIVE NG/ML
OPIATES UR QL: NEGATIVE NG/ML
OXIDANTS UR QL: NEGATIVE MCG/ML
OXYCODONE UR QL: NEGATIVE NG/ML
PCP UR QL: NEGATIVE NG/ML
PH UR: 6.2 [PH] (ref 4.5–9)
QUEST NOTES AND COMMENTS: ABNORMAL
T4 FREE SERPL-MCNC: 1 NG/DL (ref 0.8–1.8)
THC UR QL: POSITIVE NG/ML
THC UR-MCNC: 1631 NG/ML
TSH SERPL-ACNC: 27.82 MIU/L (ref 0.4–4.5)

## 2025-05-30 NOTE — PROGRESS NOTES
Subjective   Reason for Visit: Florencia Wang is an 69 y.o. female here for a Medicare Wellness visit.               HPI    Patient Care Team:  Toribio Reyna DO as PCP - General (Internal Medicine)  Toribio Reyna DO as PCP - [employer] Medicare Advantage PCP     Review of Systems    Objective   Vitals:  There were no vitals taken for this visit.      Physical Exam    Assessment & Plan

## 2025-06-16 ENCOUNTER — TELEPHONE (OUTPATIENT)
Dept: PRIMARY CARE | Facility: CLINIC | Age: 70
End: 2025-06-16
Payer: MEDICARE

## 2025-06-16 NOTE — TELEPHONE ENCOUNTER
Fisher called with symptoms of light headedness, unstable, having hard time walking, weakness. She said she thinks its her thyroid but wasn't sure . I made her appt for 6/18/25 but did recommend that if she continues to get worse to get to the ER to be checked out. Doesn't want to go to ER though.   Please advise and Thank you

## 2025-06-17 DIAGNOSIS — R05.9 COUGH, UNSPECIFIED TYPE: ICD-10-CM

## 2025-06-17 RX ORDER — ALBUTEROL SULFATE 90 UG/1
2 INHALANT RESPIRATORY (INHALATION) EVERY 4 HOURS PRN
Qty: 8.5 G | Refills: 3 | Status: SHIPPED | OUTPATIENT
Start: 2025-06-17

## 2025-06-18 ENCOUNTER — APPOINTMENT (OUTPATIENT)
Dept: RADIOLOGY | Facility: HOSPITAL | Age: 70
DRG: 391 | End: 2025-06-18
Payer: MEDICARE

## 2025-06-18 ENCOUNTER — OFFICE VISIT (OUTPATIENT)
Dept: PRIMARY CARE | Facility: CLINIC | Age: 70
End: 2025-06-18
Payer: MEDICARE

## 2025-06-18 ENCOUNTER — HOSPITAL ENCOUNTER (INPATIENT)
Facility: HOSPITAL | Age: 70
LOS: 4 days | Discharge: HOME HEALTH | DRG: 391 | End: 2025-06-22
Attending: EMERGENCY MEDICINE | Admitting: STUDENT IN AN ORGANIZED HEALTH CARE EDUCATION/TRAINING PROGRAM
Payer: MEDICARE

## 2025-06-18 ENCOUNTER — APPOINTMENT (OUTPATIENT)
Dept: CARDIOLOGY | Facility: HOSPITAL | Age: 70
DRG: 391 | End: 2025-06-18
Payer: MEDICARE

## 2025-06-18 VITALS
BODY MASS INDEX: 21.58 KG/M2 | OXYGEN SATURATION: 99 % | DIASTOLIC BLOOD PRESSURE: 68 MMHG | HEART RATE: 86 BPM | WEIGHT: 144 LBS | SYSTOLIC BLOOD PRESSURE: 110 MMHG

## 2025-06-18 DIAGNOSIS — R13.10 DYSPHAGIA, UNSPECIFIED TYPE: ICD-10-CM

## 2025-06-18 DIAGNOSIS — R19.8 CHANGE IN BOWEL MOVEMENT: ICD-10-CM

## 2025-06-18 DIAGNOSIS — I50.9 HEART FAILURE, UNSPECIFIED HF CHRONICITY, UNSPECIFIED HEART FAILURE TYPE: ICD-10-CM

## 2025-06-18 DIAGNOSIS — R63.4 UNINTENTIONAL WEIGHT LOSS: ICD-10-CM

## 2025-06-18 DIAGNOSIS — R29.898 WEAKNESS OF LOWER EXTREMITY, UNSPECIFIED LATERALITY: ICD-10-CM

## 2025-06-18 DIAGNOSIS — K22.4 ESOPHAGEAL DYSMOTILITY: ICD-10-CM

## 2025-06-18 DIAGNOSIS — G43.109 MIGRAINE WITH AURA AND WITHOUT STATUS MIGRAINOSUS, NOT INTRACTABLE: ICD-10-CM

## 2025-06-18 DIAGNOSIS — R53.1 GENERALIZED WEAKNESS: Primary | ICD-10-CM

## 2025-06-18 DIAGNOSIS — G89.4 CHRONIC PAIN SYNDROME: ICD-10-CM

## 2025-06-18 PROBLEM — H53.9 VISION CHANGES: Status: ACTIVE | Noted: 2025-06-18

## 2025-06-18 PROBLEM — Z91.041 ALLERGY TO IMAGING CONTRAST MEDIA: Status: ACTIVE | Noted: 2025-06-18

## 2025-06-18 LAB
ALBUMIN SERPL BCP-MCNC: 4 G/DL (ref 3.4–5)
ALP SERPL-CCNC: 73 U/L (ref 33–136)
ALT SERPL W P-5'-P-CCNC: 9 U/L (ref 7–45)
AMORPH CRY #/AREA UR COMP ASSIST: ABNORMAL /HPF
ANION GAP SERPL CALC-SCNC: 11 MMOL/L (ref 10–20)
APPEARANCE UR: CLEAR
AST SERPL W P-5'-P-CCNC: 19 U/L (ref 9–39)
BASOPHILS # BLD AUTO: 0.01 X10*3/UL (ref 0–0.1)
BASOPHILS NFR BLD AUTO: 0.2 %
BILIRUB SERPL-MCNC: 0.6 MG/DL (ref 0–1.2)
BILIRUB UR STRIP.AUTO-MCNC: NEGATIVE MG/DL
BUN SERPL-MCNC: 16 MG/DL (ref 6–23)
CALCIUM SERPL-MCNC: 9.6 MG/DL (ref 8.6–10.3)
CARDIAC TROPONIN I PNL SERPL HS: 47 NG/L (ref 0–13)
CARDIAC TROPONIN I PNL SERPL HS: 52 NG/L (ref 0–13)
CARDIAC TROPONIN I PNL SERPL HS: 67 NG/L (ref 0–13)
CHLORIDE SERPL-SCNC: 105 MMOL/L (ref 98–107)
CO2 SERPL-SCNC: 29 MMOL/L (ref 21–32)
COLOR UR: YELLOW
CREAT SERPL-MCNC: 0.9 MG/DL (ref 0.5–1.05)
CRP SERPL-MCNC: 1.25 MG/DL
EGFRCR SERPLBLD CKD-EPI 2021: 68 ML/MIN/1.73M*2
EOSINOPHIL # BLD AUTO: 0.05 X10*3/UL (ref 0–0.4)
EOSINOPHIL NFR BLD AUTO: 0.9 %
ERYTHROCYTE [DISTWIDTH] IN BLOOD BY AUTOMATED COUNT: 15.9 % (ref 11.5–14.5)
ERYTHROCYTE [SEDIMENTATION RATE] IN BLOOD BY WESTERGREN METHOD: 34 MM/H (ref 0–30)
FLUAV RNA RESP QL NAA+PROBE: NOT DETECTED
FLUBV RNA RESP QL NAA+PROBE: NOT DETECTED
GLUCOSE SERPL-MCNC: 102 MG/DL (ref 74–99)
GLUCOSE UR STRIP.AUTO-MCNC: NORMAL MG/DL
HCT VFR BLD AUTO: 34.9 % (ref 36–46)
HGB BLD-MCNC: 12.2 G/DL (ref 12–16)
HGB RETIC QN: 27 PG (ref 28–38)
HYALINE CASTS #/AREA URNS AUTO: ABNORMAL /LPF
IMM GRANULOCYTES # BLD AUTO: 0.02 X10*3/UL (ref 0–0.5)
IMM GRANULOCYTES NFR BLD AUTO: 0.4 % (ref 0–0.9)
IMMATURE RETIC FRACTION: 7.8 %
KETONES UR STRIP.AUTO-MCNC: NEGATIVE MG/DL
LEUKOCYTE ESTERASE UR QL STRIP.AUTO: NEGATIVE
LYMPHOCYTES # BLD AUTO: 2.35 X10*3/UL (ref 0.8–3)
LYMPHOCYTES NFR BLD AUTO: 42.8 %
MAGNESIUM SERPL-MCNC: 2.13 MG/DL (ref 1.6–2.4)
MCH RBC QN AUTO: 27.4 PG (ref 26–34)
MCHC RBC AUTO-ENTMCNC: 35 G/DL (ref 32–36)
MCV RBC AUTO: 78 FL (ref 80–100)
MONOCYTES # BLD AUTO: 0.48 X10*3/UL (ref 0.05–0.8)
MONOCYTES NFR BLD AUTO: 8.7 %
MUCOUS THREADS #/AREA URNS AUTO: ABNORMAL /LPF
NEUTROPHILS # BLD AUTO: 2.58 X10*3/UL (ref 1.6–5.5)
NEUTROPHILS NFR BLD AUTO: 47 %
NITRITE UR QL STRIP.AUTO: NEGATIVE
NRBC BLD-RTO: 0 /100 WBCS (ref 0–0)
PH UR STRIP.AUTO: 6 [PH]
PLATELET # BLD AUTO: 202 X10*3/UL (ref 150–450)
POTASSIUM SERPL-SCNC: 3.9 MMOL/L (ref 3.5–5.3)
PROT SERPL-MCNC: 7.9 G/DL (ref 6.4–8.2)
PROT UR STRIP.AUTO-MCNC: ABNORMAL MG/DL
RBC # BLD AUTO: 4.46 X10*6/UL (ref 4–5.2)
RBC # UR STRIP.AUTO: NEGATIVE MG/DL
RBC #/AREA URNS AUTO: ABNORMAL /HPF
RETICS #: 0.04 X10*6/UL (ref 0.02–0.11)
RETICS/RBC NFR AUTO: 0.8 % (ref 0.5–2)
SARS-COV-2 RNA RESP QL NAA+PROBE: NOT DETECTED
SODIUM SERPL-SCNC: 141 MMOL/L (ref 136–145)
SP GR UR STRIP.AUTO: 1.02
SQUAMOUS #/AREA URNS AUTO: ABNORMAL /HPF
T4 FREE SERPL-MCNC: 0.64 NG/DL (ref 0.61–1.12)
TSH SERPL-ACNC: 21.72 MIU/L (ref 0.44–3.98)
UROBILINOGEN UR STRIP.AUTO-MCNC: ABNORMAL MG/DL
WBC # BLD AUTO: 5.5 X10*3/UL (ref 4.4–11.3)
WBC #/AREA URNS AUTO: ABNORMAL /HPF

## 2025-06-18 PROCEDURE — 99285 EMERGENCY DEPT VISIT HI MDM: CPT | Mod: 25 | Performed by: EMERGENCY MEDICINE

## 2025-06-18 PROCEDURE — 36415 COLL VENOUS BLD VENIPUNCTURE: CPT | Performed by: EMERGENCY MEDICINE

## 2025-06-18 PROCEDURE — 87086 URINE CULTURE/COLONY COUNT: CPT | Mod: PARLAB | Performed by: EMERGENCY MEDICINE

## 2025-06-18 PROCEDURE — 84484 ASSAY OF TROPONIN QUANT: CPT | Performed by: NURSE PRACTITIONER

## 2025-06-18 PROCEDURE — 93005 ELECTROCARDIOGRAM TRACING: CPT

## 2025-06-18 PROCEDURE — 71045 X-RAY EXAM CHEST 1 VIEW: CPT | Performed by: STUDENT IN AN ORGANIZED HEALTH CARE EDUCATION/TRAINING PROGRAM

## 2025-06-18 PROCEDURE — 1159F MED LIST DOCD IN RCRD: CPT | Performed by: STUDENT IN AN ORGANIZED HEALTH CARE EDUCATION/TRAINING PROGRAM

## 2025-06-18 PROCEDURE — 84439 ASSAY OF FREE THYROXINE: CPT | Performed by: EMERGENCY MEDICINE

## 2025-06-18 PROCEDURE — 85025 COMPLETE CBC W/AUTO DIFF WBC: CPT | Performed by: EMERGENCY MEDICINE

## 2025-06-18 PROCEDURE — 85045 AUTOMATED RETICULOCYTE COUNT: CPT | Performed by: EMERGENCY MEDICINE

## 2025-06-18 PROCEDURE — 86140 C-REACTIVE PROTEIN: CPT | Performed by: NURSE PRACTITIONER

## 2025-06-18 PROCEDURE — 84484 ASSAY OF TROPONIN QUANT: CPT | Performed by: STUDENT IN AN ORGANIZED HEALTH CARE EDUCATION/TRAINING PROGRAM

## 2025-06-18 PROCEDURE — 81001 URINALYSIS AUTO W/SCOPE: CPT | Performed by: EMERGENCY MEDICINE

## 2025-06-18 PROCEDURE — 85652 RBC SED RATE AUTOMATED: CPT | Performed by: NURSE PRACTITIONER

## 2025-06-18 PROCEDURE — 83735 ASSAY OF MAGNESIUM: CPT | Performed by: NURSE PRACTITIONER

## 2025-06-18 PROCEDURE — 84443 ASSAY THYROID STIM HORMONE: CPT | Performed by: EMERGENCY MEDICINE

## 2025-06-18 PROCEDURE — 99214 OFFICE O/P EST MOD 30 MIN: CPT | Performed by: STUDENT IN AN ORGANIZED HEALTH CARE EDUCATION/TRAINING PROGRAM

## 2025-06-18 PROCEDURE — 2500000004 HC RX 250 GENERAL PHARMACY W/ HCPCS (ALT 636 FOR OP/ED): Performed by: NURSE PRACTITIONER

## 2025-06-18 PROCEDURE — 71045 X-RAY EXAM CHEST 1 VIEW: CPT

## 2025-06-18 PROCEDURE — 87636 SARSCOV2 & INF A&B AMP PRB: CPT | Performed by: EMERGENCY MEDICINE

## 2025-06-18 PROCEDURE — 70450 CT HEAD/BRAIN W/O DYE: CPT | Performed by: RADIOLOGY

## 2025-06-18 PROCEDURE — 99223 1ST HOSP IP/OBS HIGH 75: CPT | Performed by: NURSE PRACTITIONER

## 2025-06-18 PROCEDURE — 0DB78ZX EXCISION OF STOMACH, PYLORUS, VIA NATURAL OR ARTIFICIAL OPENING ENDOSCOPIC, DIAGNOSTIC: ICD-10-PCS | Performed by: INTERNAL MEDICINE

## 2025-06-18 PROCEDURE — 70450 CT HEAD/BRAIN W/O DYE: CPT

## 2025-06-18 PROCEDURE — 0D758ZZ DILATION OF ESOPHAGUS, VIA NATURAL OR ARTIFICIAL OPENING ENDOSCOPIC: ICD-10-PCS | Performed by: INTERNAL MEDICINE

## 2025-06-18 PROCEDURE — 1036F TOBACCO NON-USER: CPT | Performed by: STUDENT IN AN ORGANIZED HEALTH CARE EDUCATION/TRAINING PROGRAM

## 2025-06-18 PROCEDURE — 1200000002 HC GENERAL ROOM WITH TELEMETRY DAILY

## 2025-06-18 PROCEDURE — 80053 COMPREHEN METABOLIC PANEL: CPT | Performed by: EMERGENCY MEDICINE

## 2025-06-18 PROCEDURE — 84484 ASSAY OF TROPONIN QUANT: CPT | Performed by: EMERGENCY MEDICINE

## 2025-06-18 RX ORDER — IPRATROPIUM BROMIDE AND ALBUTEROL SULFATE 2.5; .5 MG/3ML; MG/3ML
3 SOLUTION RESPIRATORY (INHALATION) EVERY 6 HOURS PRN
Status: DISCONTINUED | OUTPATIENT
Start: 2025-06-18 | End: 2025-06-22 | Stop reason: HOSPADM

## 2025-06-18 RX ORDER — DIPHENHYDRAMINE HYDROCHLORIDE 50 MG/ML
50 INJECTION, SOLUTION INTRAMUSCULAR; INTRAVENOUS ONCE
Status: DISCONTINUED | OUTPATIENT
Start: 2025-06-18 | End: 2025-06-18

## 2025-06-18 RX ORDER — DIPHENHYDRAMINE HYDROCHLORIDE 50 MG/ML
50 INJECTION, SOLUTION INTRAMUSCULAR; INTRAVENOUS ONCE
Status: COMPLETED | OUTPATIENT
Start: 2025-06-19 | End: 2025-06-19

## 2025-06-18 RX ORDER — ENOXAPARIN SODIUM 100 MG/ML
40 INJECTION SUBCUTANEOUS EVERY 24 HOURS
Status: DISCONTINUED | OUTPATIENT
Start: 2025-06-18 | End: 2025-06-22 | Stop reason: HOSPADM

## 2025-06-18 RX ORDER — ONDANSETRON HYDROCHLORIDE 2 MG/ML
4 INJECTION, SOLUTION INTRAVENOUS EVERY 6 HOURS PRN
Status: DISCONTINUED | OUTPATIENT
Start: 2025-06-18 | End: 2025-06-22 | Stop reason: HOSPADM

## 2025-06-18 RX ORDER — ACETAMINOPHEN 325 MG/1
650 TABLET ORAL EVERY 6 HOURS PRN
Status: DISCONTINUED | OUTPATIENT
Start: 2025-06-18 | End: 2025-06-22 | Stop reason: HOSPADM

## 2025-06-18 RX ORDER — PANTOPRAZOLE SODIUM 40 MG/10ML
40 INJECTION, POWDER, LYOPHILIZED, FOR SOLUTION INTRAVENOUS DAILY
Status: DISCONTINUED | OUTPATIENT
Start: 2025-06-19 | End: 2025-06-22 | Stop reason: HOSPADM

## 2025-06-18 RX ORDER — DEXTROSE MONOHYDRATE AND SODIUM CHLORIDE 5; .9 G/100ML; G/100ML
75 INJECTION, SOLUTION INTRAVENOUS CONTINUOUS
Status: ACTIVE | OUTPATIENT
Start: 2025-06-19 | End: 2025-06-20

## 2025-06-18 RX ORDER — POLYETHYLENE GLYCOL 3350 17 G/17G
17 POWDER, FOR SOLUTION ORAL DAILY
Status: DISCONTINUED | OUTPATIENT
Start: 2025-06-19 | End: 2025-06-22 | Stop reason: HOSPADM

## 2025-06-18 RX ADMIN — ENOXAPARIN SODIUM 40 MG: 100 INJECTION SUBCUTANEOUS at 22:04

## 2025-06-18 RX ADMIN — HYDROCORTISONE SODIUM SUCCINATE 200 MG: 100 INJECTION, POWDER, FOR SOLUTION INTRAMUSCULAR; INTRAVENOUS at 22:03

## 2025-06-18 SDOH — ECONOMIC STABILITY: HOUSING INSECURITY: IN THE PAST 12 MONTHS, HOW MANY TIMES HAVE YOU MOVED WHERE YOU WERE LIVING?: 0

## 2025-06-18 SDOH — SOCIAL STABILITY: SOCIAL INSECURITY
WITHIN THE LAST YEAR, HAVE YOU BEEN KICKED, HIT, SLAPPED, OR OTHERWISE PHYSICALLY HURT BY YOUR PARTNER OR EX-PARTNER?: NO

## 2025-06-18 SDOH — SOCIAL STABILITY: SOCIAL INSECURITY: DOES ANYONE TRY TO KEEP YOU FROM HAVING/CONTACTING OTHER FRIENDS OR DOING THINGS OUTSIDE YOUR HOME?: NO

## 2025-06-18 SDOH — ECONOMIC STABILITY: HOUSING INSECURITY: IN THE LAST 12 MONTHS, WAS THERE A TIME WHEN YOU WERE NOT ABLE TO PAY THE MORTGAGE OR RENT ON TIME?: NO

## 2025-06-18 SDOH — SOCIAL STABILITY: SOCIAL INSECURITY: ARE YOU OR HAVE YOU BEEN THREATENED OR ABUSED PHYSICALLY, EMOTIONALLY, OR SEXUALLY BY ANYONE?: NO

## 2025-06-18 SDOH — ECONOMIC STABILITY: FOOD INSECURITY: WITHIN THE PAST 12 MONTHS, YOU WORRIED THAT YOUR FOOD WOULD RUN OUT BEFORE YOU GOT THE MONEY TO BUY MORE.: NEVER TRUE

## 2025-06-18 SDOH — SOCIAL STABILITY: SOCIAL INSECURITY: WITHIN THE LAST YEAR, HAVE YOU BEEN AFRAID OF YOUR PARTNER OR EX-PARTNER?: NO

## 2025-06-18 SDOH — SOCIAL STABILITY: SOCIAL INSECURITY: HAS ANYONE EVER THREATENED TO HURT YOUR FAMILY OR YOUR PETS?: NO

## 2025-06-18 SDOH — ECONOMIC STABILITY: INCOME INSECURITY: IN THE PAST 12 MONTHS HAS THE ELECTRIC, GAS, OIL, OR WATER COMPANY THREATENED TO SHUT OFF SERVICES IN YOUR HOME?: NO

## 2025-06-18 SDOH — SOCIAL STABILITY: SOCIAL INSECURITY: DO YOU FEEL UNSAFE GOING BACK TO THE PLACE WHERE YOU ARE LIVING?: NO

## 2025-06-18 SDOH — SOCIAL STABILITY: SOCIAL INSECURITY: HAVE YOU HAD THOUGHTS OF HARMING ANYONE ELSE?: NO

## 2025-06-18 SDOH — ECONOMIC STABILITY: TRANSPORTATION INSECURITY: IN THE PAST 12 MONTHS, HAS LACK OF TRANSPORTATION KEPT YOU FROM MEDICAL APPOINTMENTS OR FROM GETTING MEDICATIONS?: NO

## 2025-06-18 SDOH — ECONOMIC STABILITY: FOOD INSECURITY: WITHIN THE PAST 12 MONTHS, THE FOOD YOU BOUGHT JUST DIDN'T LAST AND YOU DIDN'T HAVE MONEY TO GET MORE.: NEVER TRUE

## 2025-06-18 SDOH — SOCIAL STABILITY: SOCIAL INSECURITY: WITHIN THE LAST YEAR, HAVE YOU BEEN HUMILIATED OR EMOTIONALLY ABUSED IN OTHER WAYS BY YOUR PARTNER OR EX-PARTNER?: NO

## 2025-06-18 SDOH — ECONOMIC STABILITY: HOUSING INSECURITY: AT ANY TIME IN THE PAST 12 MONTHS, WERE YOU HOMELESS OR LIVING IN A SHELTER (INCLUDING NOW)?: NO

## 2025-06-18 SDOH — ECONOMIC STABILITY: FOOD INSECURITY: HOW HARD IS IT FOR YOU TO PAY FOR THE VERY BASICS LIKE FOOD, HOUSING, MEDICAL CARE, AND HEATING?: VERY HARD

## 2025-06-18 SDOH — SOCIAL STABILITY: SOCIAL INSECURITY: ARE THERE ANY APPARENT SIGNS OF INJURIES/BEHAVIORS THAT COULD BE RELATED TO ABUSE/NEGLECT?: NO

## 2025-06-18 SDOH — HEALTH STABILITY: PHYSICAL HEALTH: ON AVERAGE, HOW MANY DAYS PER WEEK DO YOU ENGAGE IN MODERATE TO STRENUOUS EXERCISE (LIKE A BRISK WALK)?: 0 DAYS

## 2025-06-18 SDOH — SOCIAL STABILITY: SOCIAL INSECURITY
WITHIN THE LAST YEAR, HAVE YOU BEEN RAPED OR FORCED TO HAVE ANY KIND OF SEXUAL ACTIVITY BY YOUR PARTNER OR EX-PARTNER?: NO

## 2025-06-18 SDOH — HEALTH STABILITY: PHYSICAL HEALTH: ON AVERAGE, HOW MANY MINUTES DO YOU ENGAGE IN EXERCISE AT THIS LEVEL?: 20 MIN

## 2025-06-18 SDOH — SOCIAL STABILITY: SOCIAL INSECURITY: HAVE YOU HAD ANY THOUGHTS OF HARMING ANYONE ELSE?: NO

## 2025-06-18 SDOH — SOCIAL STABILITY: SOCIAL INSECURITY: ABUSE: ADULT

## 2025-06-18 SDOH — SOCIAL STABILITY: SOCIAL INSECURITY: DO YOU FEEL ANYONE HAS EXPLOITED OR TAKEN ADVANTAGE OF YOU FINANCIALLY OR OF YOUR PERSONAL PROPERTY?: NO

## 2025-06-18 ASSESSMENT — ACTIVITIES OF DAILY LIVING (ADL)
GROOMING: INDEPENDENT
JUDGMENT_ADEQUATE_SAFELY_COMPLETE_DAILY_ACTIVITIES: YES
ADEQUATE_TO_COMPLETE_ADL: YES
BATHING: INDEPENDENT
HEARING - RIGHT EAR: DIFFICULTY WITH NOISE
FEEDING YOURSELF: INDEPENDENT
WALKS IN HOME: NEEDS ASSISTANCE
DRESSING YOURSELF: INDEPENDENT
TOILETING: NEEDS ASSISTANCE
PATIENT'S MEMORY ADEQUATE TO SAFELY COMPLETE DAILY ACTIVITIES?: YES
HEARING - LEFT EAR: DEAF
LACK_OF_TRANSPORTATION: NO

## 2025-06-18 ASSESSMENT — COGNITIVE AND FUNCTIONAL STATUS - GENERAL
TOILETING: A LITTLE
WALKING IN HOSPITAL ROOM: A LITTLE
DRESSING REGULAR LOWER BODY CLOTHING: A LITTLE
DRESSING REGULAR UPPER BODY CLOTHING: A LITTLE
PATIENT BASELINE BEDBOUND: NO
MOBILITY SCORE: 22
DAILY ACTIVITIY SCORE: 20
CLIMB 3 TO 5 STEPS WITH RAILING: A LITTLE
HELP NEEDED FOR BATHING: A LITTLE

## 2025-06-18 ASSESSMENT — LIFESTYLE VARIABLES
HOW OFTEN DO YOU HAVE A DRINK CONTAINING ALCOHOL: NEVER
HOW OFTEN DO YOU HAVE 6 OR MORE DRINKS ON ONE OCCASION: NEVER
AUDIT-C TOTAL SCORE: 0
EVER HAD A DRINK FIRST THING IN THE MORNING TO STEADY YOUR NERVES TO GET RID OF A HANGOVER: NO
HOW MANY STANDARD DRINKS CONTAINING ALCOHOL DO YOU HAVE ON A TYPICAL DAY: PATIENT DOES NOT DRINK
HAVE YOU EVER FELT YOU SHOULD CUT DOWN ON YOUR DRINKING: NO
AUDIT-C TOTAL SCORE: 0
EVER FELT BAD OR GUILTY ABOUT YOUR DRINKING: NO
SKIP TO QUESTIONS 9-10: 1
TOTAL SCORE: 0
HAVE PEOPLE ANNOYED YOU BY CRITICIZING YOUR DRINKING: NO

## 2025-06-18 ASSESSMENT — PATIENT HEALTH QUESTIONNAIRE - PHQ9
2. FEELING DOWN, DEPRESSED OR HOPELESS: NOT AT ALL
SUM OF ALL RESPONSES TO PHQ9 QUESTIONS 1 & 2: 0
2. FEELING DOWN, DEPRESSED OR HOPELESS: NOT AT ALL
1. LITTLE INTEREST OR PLEASURE IN DOING THINGS: NOT AT ALL
SUM OF ALL RESPONSES TO PHQ9 QUESTIONS 1 AND 2: 0
1. LITTLE INTEREST OR PLEASURE IN DOING THINGS: NOT AT ALL

## 2025-06-18 ASSESSMENT — ENCOUNTER SYMPTOMS: DEPRESSION: 0

## 2025-06-18 ASSESSMENT — PAIN SCALES - GENERAL: PAINLEVEL_OUTOF10: 0 - NO PAIN

## 2025-06-18 NOTE — PROGRESS NOTES
Subjective   Patient ID: Florencia Wang is a 69 y.o. female who presents for Weight Loss, Weakness, and Dizziness.      Presents today with a few weeks she had no appetite lightheadedness possibly about 10 pounds.  No appetite or drinking has general body aches Hemofil lupus flare.  She is a week and family is requesting what hospital you from rehab.  She recently.  No belly pain no nausea vomiting chest pain or shortness of breath.  No thyroid disease little off normal for patient's symptoms.      Review of Systems   All other systems reviewed and are negative.      Objective   /68 (BP Location: Right arm, Patient Position: Sitting, BP Cuff Size: Small adult)   Pulse 86   Wt 65.3 kg (144 lb)   SpO2 99%   BMI 21.58 kg/m²     Physical Exam  Constitutional:       Appearance: Normal appearance.   HENT:      Head: Normocephalic and atraumatic.      Right Ear: Tympanic membrane and ear canal normal.      Left Ear: Tympanic membrane and ear canal normal.      Mouth/Throat:      Mouth: Mucous membranes are moist.      Pharynx: Oropharynx is clear.   Eyes:      Extraocular Movements: Extraocular movements intact.      Conjunctiva/sclera: Conjunctivae normal.      Pupils: Pupils are equal, round, and reactive to light.   Cardiovascular:      Rate and Rhythm: Normal rate and regular rhythm.      Pulses: Normal pulses.      Heart sounds: Normal heart sounds.   Pulmonary:      Effort: Pulmonary effort is normal.      Breath sounds: Normal breath sounds.   Abdominal:      General: Abdomen is flat. Bowel sounds are normal.      Palpations: Abdomen is soft.   Musculoskeletal:         General: Normal range of motion.      Cervical back: Normal range of motion and neck supple.   Skin:     General: Skin is warm and dry.      Capillary Refill: Capillary refill takes 2 to 3 seconds.   Neurological:      General: No focal deficit present.      Mental Status: She is alert and oriented to person, place, and time. Mental status  is at baseline.   Psychiatric:         Mood and Affect: Mood normal.         Behavior: Behavior normal.         Thought Content: Thought content normal.         Judgment: Judgment normal.         1. Generalized weakness (Primary)    Given his inability to walk she was to ER for further workup

## 2025-06-18 NOTE — ED PROVIDER NOTES
HPI   Chief Complaint   Patient presents with    Weakness, Gen    Dizziness       69-year-old female with a history of sickle cell anemia, lupus, and thyroid dysfunction presents with generalized weakness, body aches, headache, decreased appetite, and a weight loss of 10 pounds over the past 3 weeks, unintentional.  She is not feeling well.  She is lightheaded.  Generally weak.  No appetite.  She spoke with her primary care physician a few weeks ago.  Her thyroid levels were off then.  Medications were adjusted.  Still feeling very weak and not functioning well at home.  Saw her primary care doctor today and was referred to the emergency department for a broad medical workup and admission to the hospital.              Patient History   Medical History[1]  Surgical History[2]  Family History[3]  Social History[4]    Physical Exam   ED Triage Vitals [06/18/25 1327]   Temperature Heart Rate Respirations BP   37.3 °C (99.1 °F) 70 16 132/61      Pulse Ox Temp Source Heart Rate Source Patient Position   94 % Tympanic Monitor Sitting      BP Location FiO2 (%)     Right arm --       Physical Exam  Constitutional:       General: She is not in acute distress.  HENT:      Nose: Nose normal.      Mouth/Throat:      Mouth: Mucous membranes are dry.   Eyes:      Extraocular Movements: Extraocular movements intact.      Pupils: Pupils are equal, round, and reactive to light.   Cardiovascular:      Rate and Rhythm: Normal rate and regular rhythm.      Heart sounds: No murmur heard.  Pulmonary:      Effort: Pulmonary effort is normal.      Breath sounds: Normal breath sounds. No stridor.   Abdominal:      General: There is no distension.      Palpations: Abdomen is soft.   Musculoskeletal:         General: Swelling present. Normal range of motion.      Cervical back: Normal range of motion and neck supple.   Skin:     General: Skin is warm and dry.      Capillary Refill: Capillary refill takes less than 2 seconds.   Neurological:       General: No focal deficit present.      Mental Status: She is alert and oriented to person, place, and time.   Psychiatric:         Mood and Affect: Mood normal.           ED Course & MDM   ED Course as of 06/19/25 1410   Wed Jun 18, 2025   1548 Interpreted by the Emergency Department Attending: ECG revealed normal sinus rhythm at a rate of 79 beats per minute with ND interval 190 , QRS of 93 , QTc of 395.  No acute injury pattern. Previous EKG on May revealed nonspecific changes.    [MG]      ED Course User Index  [MG] Dayne Bobo,          Diagnoses as of 06/19/25 1410   Generalized weakness                 No data recorded     [place] Coma Scale Score: 15 (06/18/25 1533 : Selina Liu RN)                           Medical Decision Making  comprehensive metabolic/infectious workup.  Chest x-ray.  CT head.  Care turned over to the oncoming physician.        Procedure  Procedures       Iain Bang MD  06/18/25 1443       [1]   Past Medical History:  Diagnosis Date    Abnormal weight gain 02/05/2016    Weight gain    Anxiety     Bipolar disorder, unspecified (Multi) 02/05/2016    Bipolar disorder    Migraines     Old myocardial infarction 11/19/2019    History of myocardial infarction    Other fatigue 02/05/2016    Tired    Personal history of (corrected) congenital malformations of heart and circulatory system     History of congenital anomaly of heart    Personal history of other diseases of the circulatory system 02/05/2016    History of congestive heart failure    Personal history of other diseases of the nervous system and sense organs 11/06/2019    History of migraine    Personal history of other diseases of the respiratory system 11/19/2019    History of chronic obstructive lung disease    Personal history of other diseases of the respiratory system 02/05/2016    History of bronchitis    Personal history of other diseases of the respiratory system 02/05/2016    History of asthma    Personal  history of other endocrine, nutritional and metabolic disease 10/30/2015    History of hypercholesterolemia    Personal history of other malignant neoplasm of skin 2016    History of malignant neoplasm of skin    Personal history of other mental and behavioral disorders 2016    History of depression    Personal history of pneumonia (recurrent) 2016    History of pneumonia    Personal history of transient ischemic attack (TIA), and cerebral infarction without residual deficits 2016    History of stroke    Personal history of transient ischemic attack (TIA), and cerebral infarction without residual deficits 2016    History of transient cerebral ischemia   [2]   Past Surgical History:  Procedure Laterality Date    BREAST SURGERY  10/30/2015    Breast Surgery     SECTION, CLASSIC  10/30/2015     Section    FOOT SURGERY  10/30/2015    Foot Surgery    HYSTERECTOMY  10/30/2015    Hysterectomy    MR HEAD ANGIO WO IV CONTRAST  9/3/2015    MR HEAD ANGIO WO IV CONTRAST 9/3/2015 [facility]    MR HEAD ANGIO WO IV CONTRAST  2019    MR HEAD ANGIO WO IV CONTRAST 2019 [facility]GAC[facility]    MR HEAD ANGIO WO IV CONTRAST  2019    MR HEAD ANGIO WO IV CONTRAST 2019 [facility]    MR HEAD ANGIO WO IV CONTRAST  2021    MR HEAD ANGIO WO IV CONTRAST 2021 [facility] CLINICAL LEGAC[facility]    MR HEAD ANGIO WO IV CONTRAST  2021    MR HEAD ANGIO WO IV CONTRAST 2021 [facility]    MR NECK ANGIO WO IV CONTRAST  2019    MR NECK ANGIO WO IV CONTRAST 2019 [facility] CLINICAL LEGAC[facility]    MR NECK ANGIO WO IV CONTRAST  2021    MR NECK ANGIO WO IV CONTRAST 2021 [facility] CL[facility]ICAL LEGAC[facility]    OTHER SURGICAL HISTORY  2022    Cardiac catheterization    OTHER SURGICAL HISTORY  2019    Excision melanoma    US ASPIRATION INJECTION INTERMEDIATE JOINT  9/3/2020    US ASPIRATION INJECTION INTERMEDIATE JOINT 9/3/2020 [facility]    US  ASPIRATION INJECTION INTERMEDIATE JOINT  9/21/2020    US ASPIRATION INJECTION INTERMEDIATE JOINT 9/21/2020 [facility]    US ASPIRATION INJECTION INTERMEDIATE JOINT  1/8/2021    US ASPIRATION INJECTION INTERMEDIATE JOINT 1/8/2021 [facility]    US ASPIRATION INJECTION INTERMEDIATE JOINT  5/28/2021    US ASPIRATION INJECTION INTERMEDIATE JOINT 5/28/2021 [facility]    US ASPIRATION INJECTION INTERMEDIATE JOINT  5/28/2021    US ASPIRATION INJECTION INTERMEDIATE JOINT 5/28/2021 [facility]   [3]   Family History  Problem Relation Name Age of Onset    Cancer Mother      Hypertension Mother      Diabetes Mother      Cancer Father     [4]   Social History  Tobacco Use    Smoking status: Former     Types: Cigarettes     Start date: 1972     Passive exposure: Past    Smokeless tobacco: Never   Vaping Use    Vaping status: Never Used   Substance Use Topics    Alcohol use: Not Currently    Drug use: Yes     Types: Marijuana        Iain Bang MD  06/19/25 1186

## 2025-06-18 NOTE — ED TRIAGE NOTES
Pt coming in for weakness. States that she has been losing weight. Feels like she is moving faster than her body will allow and is affecting her balance. States she has thyroid issues and lupus. States that it has been going on for the last 3 weeks.

## 2025-06-18 NOTE — PROGRESS NOTES
I Dr. Bobo am the primary provider of record.     History: This 69-year-old female signed out to me by the previous physician to follow-up on entire workup.  Patient has been having generalized weakness body aches as well as significant weight loss over the past few weeks.  She was sent in by her primary physician today to follow-up on lab work and hospital admission.    Exam:     VS: As documented in the triage note from today's date and EMR flowsheet were reviewed.  Gen: Well developed. No acute distress. Seated in bed. Appears nontoxic.  GCS 15.  Skin: Warm. Dry. Intact. No rashes or lesions.  Eyes: Pupils equally round and reactive to light. Clear sclera. EOMI.  HENT: Atraumatic appearance. Mucosal membranes dry. No oral lesions, uvula midline, airway patent.   CV: Regular rate and regular rhythm. S1, S2.  Trace bilateral pedal edema. Warm extremities.  Resp: Nonlabored breathing Clear to auscultation bilaterally. No increased work of breathing.   GI: Soft and nontender. No rebound or guarding. Bowel sounds x4 present.   MSK: Symmetric muscle bulk. No joint swelling in the extremities. Compartments are soft. Neurovascularly intact x4 extremities. Radial pulses +2 equal bilaterally.  Pedal pulses +2 equal bilaterally.  Neuro: Alert. CN II - XII intact. Speech fluent. Moving all extremities. No focal deficits. Gait normal.  NIH 0.  Psych: Appropriate. Kempt.    MDM: Patient arrives slightly hypertensive recommend follow-up with primary provider for repeat checks.  Lab work reveals no signs of anemia no leukocytosis making infectious etiology less likely.  There are no self electrolyte derangements renal function is appropriate.  TSH is slightly elevated although patient does have known hypothyroidism on Synthroid T4 is pending.  Cardide troponin minimally elevated patient has no chest pain no acute injury pattern on EKG and no runs of dysrhythmias doubt atypical ACS at this time.  Viral swabs are negative.  I  did speak with the primary provider Dr. Reyna has agreed to accept the patient for hospital admission for further workup.  Urinalysis is pending at time of admission.  He did take over care at time of admission order.  Patient is resting comfortably asymptomatic at this time.  She notes that she has been having difficulty swallowing from time to time this is the reason for her poor oral intake she may benefit from upper endoscopy.      Information provided by the patient and family  Consults PCP  Past medical history complicating workup hypothyroidism  Previous medical records reviewed office visit 5/27/2025  Considered CT imaging of the chest abdomen pelvis although no indication at this time.  Shared medical decision making patient agreeable with hospital admission.    RADIOLOGY:   Non-plain film images such as CT, Ultrasound and MRI are read by the radiologist. Plain radiographic images are visualized and preliminarily interpreted by the emergency physician with the below findings: Chest x-ray shows no evidence of infiltrates.      Interpretation per the Radiologist below, if available at the time of this note:    CT head wo IV contrast   Final Result   No without acute intracranial process.        Signed by: William Shane 6/18/2025 3:36 PM   Dictation workstation:   [id number]      XR chest 1 view   Final Result   No radiographic evidence of acute cardiopulmonary abnormality.             MACRO:   None        Signed by: Ta Avila 6/18/2025 3:12 PM   Dictation workstation:   [id number]          ED Course as of 06/18/25 1612   Wed Jun 18, 2025   1548 Interpreted by the Emergency Department Attending: ECG revealed normal sinus rhythm at a rate of 79 beats per minute with ME interval 190 , QRS of 93 , QTc of 395.  No acute injury pattern. Previous EKG on May revealed nonspecific changes.    [MG]      ED Course User Index  [MG] Dayne Bobo DO         Diagnoses as of 06/18/25 1612   Generalized weakness

## 2025-06-19 ENCOUNTER — APPOINTMENT (OUTPATIENT)
Dept: RADIOLOGY | Facility: HOSPITAL | Age: 70
DRG: 391 | End: 2025-06-19
Payer: MEDICARE

## 2025-06-19 LAB
ANION GAP SERPL CALC-SCNC: 13 MMOL/L (ref 10–20)
ATRIAL RATE: 79 BPM
BUN SERPL-MCNC: 13 MG/DL (ref 6–23)
CALCIUM SERPL-MCNC: 9.6 MG/DL (ref 8.6–10.3)
CARDIAC TROPONIN I PNL SERPL HS: 28 NG/L (ref 0–13)
CHLORIDE SERPL-SCNC: 104 MMOL/L (ref 98–107)
CO2 SERPL-SCNC: 29 MMOL/L (ref 21–32)
CREAT SERPL-MCNC: 0.89 MG/DL (ref 0.5–1.05)
EGFRCR SERPLBLD CKD-EPI 2021: 69 ML/MIN/1.73M*2
ERYTHROCYTE [DISTWIDTH] IN BLOOD BY AUTOMATED COUNT: 15.9 % (ref 11.5–14.5)
GLUCOSE BLD MANUAL STRIP-MCNC: 176 MG/DL (ref 74–99)
GLUCOSE SERPL-MCNC: 145 MG/DL (ref 74–99)
HCT VFR BLD AUTO: 34.1 % (ref 36–46)
HGB BLD-MCNC: 11.4 G/DL (ref 12–16)
MCH RBC QN AUTO: 26.5 PG (ref 26–34)
MCHC RBC AUTO-ENTMCNC: 33.4 G/DL (ref 32–36)
MCV RBC AUTO: 79 FL (ref 80–100)
NRBC BLD-RTO: 0 /100 WBCS (ref 0–0)
P AXIS: 69 DEGREES
PLATELET # BLD AUTO: 204 X10*3/UL (ref 150–450)
POTASSIUM SERPL-SCNC: 4.7 MMOL/L (ref 3.5–5.3)
PR INTERVAL: 190 MS
Q ONSET: 249 MS
QRS COUNT: 13 BEATS
QRS DURATION: 93 MS
QT INTERVAL: 344 MS
QTC CALCULATION(BAZETT): 395 MS
QTC FREDERICIA: 377 MS
R AXIS: 264 DEGREES
RBC # BLD AUTO: 4.3 X10*6/UL (ref 4–5.2)
SODIUM SERPL-SCNC: 141 MMOL/L (ref 136–145)
T AXIS: 47 DEGREES
T OFFSET: 421 MS
VALPROATE SERPL-MCNC: 27 UG/ML (ref 50–100)
VENTRICULAR RATE: 79 BPM
WBC # BLD AUTO: 3.3 X10*3/UL (ref 4.4–11.3)

## 2025-06-19 PROCEDURE — 71260 CT THORAX DX C+: CPT | Performed by: RADIOLOGY

## 2025-06-19 PROCEDURE — 97161 PT EVAL LOW COMPLEX 20 MIN: CPT | Mod: GP

## 2025-06-19 PROCEDURE — 74177 CT ABD & PELVIS W/CONTRAST: CPT | Performed by: RADIOLOGY

## 2025-06-19 PROCEDURE — 2500000004 HC RX 250 GENERAL PHARMACY W/ HCPCS (ALT 636 FOR OP/ED): Performed by: NURSE PRACTITIONER

## 2025-06-19 PROCEDURE — 80048 BASIC METABOLIC PNL TOTAL CA: CPT | Performed by: NURSE PRACTITIONER

## 2025-06-19 PROCEDURE — 99221 1ST HOSP IP/OBS SF/LOW 40: CPT | Performed by: INTERNAL MEDICINE

## 2025-06-19 PROCEDURE — 71260 CT THORAX DX C+: CPT

## 2025-06-19 PROCEDURE — 72125 CT NECK SPINE W/O DYE: CPT | Performed by: RADIOLOGY

## 2025-06-19 PROCEDURE — 72131 CT LUMBAR SPINE W/O DYE: CPT | Performed by: RADIOLOGY

## 2025-06-19 PROCEDURE — 84484 ASSAY OF TROPONIN QUANT: CPT | Performed by: NURSE PRACTITIONER

## 2025-06-19 PROCEDURE — 82947 ASSAY GLUCOSE BLOOD QUANT: CPT

## 2025-06-19 PROCEDURE — 99223 1ST HOSP IP/OBS HIGH 75: CPT | Performed by: NURSE PRACTITIONER

## 2025-06-19 PROCEDURE — 36415 COLL VENOUS BLD VENIPUNCTURE: CPT | Performed by: NURSE PRACTITIONER

## 2025-06-19 PROCEDURE — 2500000001 HC RX 250 WO HCPCS SELF ADMINISTERED DRUGS (ALT 637 FOR MEDICARE OP): Performed by: PHYSICIAN ASSISTANT

## 2025-06-19 PROCEDURE — 2500000001 HC RX 250 WO HCPCS SELF ADMINISTERED DRUGS (ALT 637 FOR MEDICARE OP): Performed by: STUDENT IN AN ORGANIZED HEALTH CARE EDUCATION/TRAINING PROGRAM

## 2025-06-19 PROCEDURE — 2550000001 HC RX 255 CONTRASTS: Performed by: STUDENT IN AN ORGANIZED HEALTH CARE EDUCATION/TRAINING PROGRAM

## 2025-06-19 PROCEDURE — 1200000002 HC GENERAL ROOM WITH TELEMETRY DAILY

## 2025-06-19 PROCEDURE — 72125 CT NECK SPINE W/O DYE: CPT

## 2025-06-19 PROCEDURE — 85027 COMPLETE CBC AUTOMATED: CPT | Performed by: NURSE PRACTITIONER

## 2025-06-19 PROCEDURE — 99223 1ST HOSP IP/OBS HIGH 75: CPT | Performed by: STUDENT IN AN ORGANIZED HEALTH CARE EDUCATION/TRAINING PROGRAM

## 2025-06-19 PROCEDURE — 97165 OT EVAL LOW COMPLEX 30 MIN: CPT | Mod: GO

## 2025-06-19 PROCEDURE — 72128 CT CHEST SPINE W/O DYE: CPT | Performed by: RADIOLOGY

## 2025-06-19 PROCEDURE — 80164 ASSAY DIPROPYLACETIC ACD TOT: CPT

## 2025-06-19 PROCEDURE — 99223 1ST HOSP IP/OBS HIGH 75: CPT | Performed by: PSYCHIATRY & NEUROLOGY

## 2025-06-19 RX ORDER — DIVALPROEX SODIUM 500 MG/1
500 TABLET, DELAYED RELEASE ORAL 2 TIMES DAILY
Status: DISCONTINUED | OUTPATIENT
Start: 2025-06-19 | End: 2025-06-19

## 2025-06-19 RX ORDER — LEVOTHYROXINE SODIUM 100 UG/1
100 TABLET ORAL DAILY
Status: DISCONTINUED | OUTPATIENT
Start: 2025-06-19 | End: 2025-06-22 | Stop reason: HOSPADM

## 2025-06-19 RX ORDER — DULOXETIN HYDROCHLORIDE 30 MG/1
60 CAPSULE, DELAYED RELEASE ORAL DAILY
Status: DISCONTINUED | OUTPATIENT
Start: 2025-06-19 | End: 2025-06-22 | Stop reason: HOSPADM

## 2025-06-19 RX ORDER — OXYCODONE AND ACETAMINOPHEN 5; 325 MG/1; MG/1
1 TABLET ORAL EVERY 12 HOURS PRN
Refills: 0 | Status: DISCONTINUED | OUTPATIENT
Start: 2025-06-19 | End: 2025-06-22 | Stop reason: HOSPADM

## 2025-06-19 RX ORDER — POTASSIUM CHLORIDE 20 MEQ/1
20 TABLET, EXTENDED RELEASE ORAL DAILY
Status: DISCONTINUED | OUTPATIENT
Start: 2025-06-19 | End: 2025-06-22 | Stop reason: HOSPADM

## 2025-06-19 RX ORDER — PANTOPRAZOLE SODIUM 40 MG/1
40 TABLET, DELAYED RELEASE ORAL
Status: DISCONTINUED | OUTPATIENT
Start: 2025-06-20 | End: 2025-06-19

## 2025-06-19 RX ORDER — DM/P-EPHED/ACETAMINOPH/DOXYLAM
1 LIQUID (ML) ORAL DAILY
COMMUNITY
End: 2025-06-24

## 2025-06-19 RX ORDER — FLUTICASONE PROPIONATE 50 MCG
1 SPRAY, SUSPENSION (ML) NASAL DAILY PRN
Status: DISCONTINUED | OUTPATIENT
Start: 2025-06-19 | End: 2025-06-22 | Stop reason: HOSPADM

## 2025-06-19 RX ORDER — NAPROXEN SODIUM 220 MG/1
81 TABLET, FILM COATED ORAL
Status: DISCONTINUED | OUTPATIENT
Start: 2025-06-19 | End: 2025-06-22 | Stop reason: HOSPADM

## 2025-06-19 RX ORDER — NITROGLYCERIN 0.4 MG/1
0.4 TABLET SUBLINGUAL EVERY 5 MIN PRN
Status: DISCONTINUED | OUTPATIENT
Start: 2025-06-19 | End: 2025-06-22 | Stop reason: HOSPADM

## 2025-06-19 RX ORDER — PREDNISONE 10 MG/1
10 TABLET ORAL EVERY OTHER DAY
Status: DISCONTINUED | OUTPATIENT
Start: 2025-06-20 | End: 2025-06-22 | Stop reason: HOSPADM

## 2025-06-19 RX ORDER — ATORVASTATIN CALCIUM 20 MG/1
20 TABLET, FILM COATED ORAL DAILY
Status: DISCONTINUED | OUTPATIENT
Start: 2025-06-19 | End: 2025-06-22 | Stop reason: HOSPADM

## 2025-06-19 RX ORDER — ALPRAZOLAM 0.25 MG/1
0.5 TABLET ORAL ONCE
Status: DISCONTINUED | OUTPATIENT
Start: 2025-06-19 | End: 2025-06-22 | Stop reason: HOSPADM

## 2025-06-19 RX ORDER — ZOLPIDEM TARTRATE 5 MG/1
10 TABLET ORAL NIGHTLY PRN
Status: DISCONTINUED | OUTPATIENT
Start: 2025-06-19 | End: 2025-06-22 | Stop reason: HOSPADM

## 2025-06-19 RX ORDER — HYDROXYCHLOROQUINE SULFATE 200 MG/1
200 TABLET, FILM COATED ORAL DAILY
Status: DISCONTINUED | OUTPATIENT
Start: 2025-06-19 | End: 2025-06-22 | Stop reason: HOSPADM

## 2025-06-19 RX ADMIN — HYDROCORTISONE SODIUM SUCCINATE 200 MG: 100 INJECTION, POWDER, FOR SOLUTION INTRAMUSCULAR; INTRAVENOUS at 08:52

## 2025-06-19 RX ADMIN — IOHEXOL 75 ML: 350 INJECTION, SOLUTION INTRAVENOUS at 13:57

## 2025-06-19 RX ADMIN — DULOXETINE 60 MG: 30 CAPSULE, DELAYED RELEASE ORAL at 15:11

## 2025-06-19 RX ADMIN — DEXTROSE AND SODIUM CHLORIDE 75 ML/HR: 5; .9 INJECTION, SOLUTION INTRAVENOUS at 00:11

## 2025-06-19 RX ADMIN — DEXTROSE AND SODIUM CHLORIDE 75 ML/HR: 5; .9 INJECTION, SOLUTION INTRAVENOUS at 15:19

## 2025-06-19 RX ADMIN — DIPHENHYDRAMINE HYDROCHLORIDE 50 MG: 50 INJECTION, SOLUTION INTRAMUSCULAR; INTRAVENOUS at 11:40

## 2025-06-19 RX ADMIN — OXYCODONE HYDROCHLORIDE AND ACETAMINOPHEN 1 TABLET: 5; 325 TABLET ORAL at 20:32

## 2025-06-19 RX ADMIN — ASPIRIN 81 MG CHEWABLE TABLET 81 MG: 81 TABLET CHEWABLE at 15:12

## 2025-06-19 RX ADMIN — HYDROXYCHLOROQUINE SULFATE 200 MG: 200 TABLET, FILM COATED ORAL at 15:13

## 2025-06-19 RX ADMIN — PANTOPRAZOLE SODIUM 40 MG: 40 INJECTION, POWDER, FOR SOLUTION INTRAVENOUS at 08:52

## 2025-06-19 RX ADMIN — ATORVASTATIN CALCIUM 20 MG: 20 TABLET, FILM COATED ORAL at 15:11

## 2025-06-19 RX ADMIN — ENOXAPARIN SODIUM 40 MG: 100 INJECTION SUBCUTANEOUS at 20:32

## 2025-06-19 ASSESSMENT — ENCOUNTER SYMPTOMS
WEAKNESS: 1
SHORTNESS OF BREATH: 1
DYSPNEA ON EXERTION: 1
NEAR-SYNCOPE: 0
ORTHOPNEA: 0
PND: 0
PALPITATIONS: 0

## 2025-06-19 ASSESSMENT — COGNITIVE AND FUNCTIONAL STATUS - GENERAL
WALKING IN HOSPITAL ROOM: A LITTLE
STANDING UP FROM CHAIR USING ARMS: A LITTLE
DRESSING REGULAR UPPER BODY CLOTHING: A LITTLE
CLIMB 3 TO 5 STEPS WITH RAILING: TOTAL
PERSONAL GROOMING: A LITTLE
DAILY ACTIVITIY SCORE: 18
HELP NEEDED FOR BATHING: A LITTLE
TURNING FROM BACK TO SIDE WHILE IN FLAT BAD: A LITTLE
EATING MEALS: A LITTLE
TOILETING: A LITTLE
DRESSING REGULAR LOWER BODY CLOTHING: A LITTLE
MOVING FROM LYING ON BACK TO SITTING ON SIDE OF FLAT BED WITH BEDRAILS: A LITTLE
MOVING TO AND FROM BED TO CHAIR: A LITTLE
MOBILITY SCORE: 16

## 2025-06-19 ASSESSMENT — PAIN - FUNCTIONAL ASSESSMENT
PAIN_FUNCTIONAL_ASSESSMENT: 0-10

## 2025-06-19 ASSESSMENT — PAIN SCALES - GENERAL
PAINLEVEL_OUTOF10: 1
PAINLEVEL_OUTOF10: 9

## 2025-06-19 ASSESSMENT — PAIN DESCRIPTION - DESCRIPTORS: DESCRIPTORS: ACHING

## 2025-06-19 ASSESSMENT — ACTIVITIES OF DAILY LIVING (ADL): BATHING_ASSISTANCE: MINIMAL

## 2025-06-19 NOTE — ASSESSMENT & PLAN NOTE
-Telemetry monitoring  -metabolic work up unrevealing  - Pan positive review of systems  - Change in bowel pattern  as well as bilateral lower extremity weakness for cauda equina/nerve compression.  Obtain CT scans of the axial spine.   -Neurology consult, appreciate input  - CT chest abdomen pelvis with IV contrast to further evaluate for possible etiologies for weight loss  - Clear liquid diet, n.p.o. after midnight  - GI consult, appreciate input may benefit from EGD +/-colonoscopy given  family history   - Elevated troponin, likely demand ischemia as patient has been having chest pain we will repeat a third troponin.    - ESR and CRP very mildly elevated, low suspicion for lupus flare  - metabolic work up unrevealing

## 2025-06-19 NOTE — H&P
History Of Present Illness  Florencia Wang is a 69 year old female with past medical history of  hypertension, hyperlipidemia, CAD (Nuclear medicine stress test 5/9/2025 normal), COPD on supplemental oxygen as needed, TIA, arthritis, SLE, hemoglobin C trait, osteoarthritis, avascular necrosis of  bilateral hips, hypothyroidism, epilepsy, and migraines. Patient presents with numerous complaints including lower ext weakness, headache, dysphagia, change in bowel pattern, and unintentional weight loss.      Extensive positive ROS including:  - Migraines/headaches -having them more frequently and have been more severe recently. Follows with Dr Paulson.  - Lightheadedness, but no syncope.  - feels like she is ”floating outside her body”  - Sinus congestion and a large amount of mucous production  - reports seeing floaters seen eyes open and closed, reports this started recently - “specks of white”. No acute vision loss.   - Dysphagia primarily with solids, feels like things are not going down. Tolerating fluids better. Has been only eating some fruit in the mornings and peanut butter sandwich in the evening. Poor Appetite. 10 pound unintentional weight loss x 3 weeks. History of esophageal motility issues, manometry done 02/2023  - reports stools are soft but she is unable to push them out and has to manually apply pressure around rectum to pass stool.  - History benign breast mass, feels as though it has returned in the left breast.  - No dysuria. Frequent urination at night.  - Bilateral lower extremity weakness. Reports difficulty with ambulation, has been staying mostly in bed. States needing to lift her legs with hands to move them in the bed. Also reports numbness in lower ext. but no saddle anesthesia  - was unable to decide on if she has been having chest pian, no palpitations.  - SOB. Worse with activity. More recently, has been wearing 2-3 L NC O2 at night and occasionally during the day. Reports wheezing.  -  Reports back pain, but states she can mentally block out the pain. Reports degenerative disk disease. Does not want back surgery.  - Reports bruising on lower extremities, no open lesions or wounds  - Denies recent new medications  - States current symptoms could be a lupus flare  -Code status is full code.    ER Course: Hemodynamically stable, afebrile.  EKG showed Sinus rhythm with PACs and left anterior fascicular block, right ventricular hypertrophy, no acute ST changes.  WBC 5.5, hemoglobin 12.2/hematocrit 34.9, microcytic, normochromic, platelets 202.  Glucose 102 otherwise CMP is normal.  High-sensitivity troponin 47 with repeat of 52.  UA for infection, 1+ urobilinogen.  Influenza and COVID-19 negative.  CT head without contrast negative for acute process. CXR showed no acute findings.    Past medical history: As above  Past surgical history: Breast surgery (benign mass),  section, excision of melanoma, foot surgery, left hand surgery, hysterectomy  Family history: CVA, cancer  Social history: Former smoker, quit ~ 11 years ago. History of marijuana use - c    EGD 2023  Impression:              - Small hiatal hernia.  - Normal esophagus.  - Gastritis. Biopsied.  - Normal examined duodenum.    2022  Impression:           - Normal oropharynx.   - Small hiatus hernia otherwise normal esophagus.   - Erythematous mucosa in the antrum. Biopsied for H. Pylori.   - Normal examined duodenum.    Esophageal Manometry   Impression:              - Manometry indicating ineffective esophageal motility with fragmented peristalsis and poor bolus transit .       Past Medical History  Medical History[1]    Surgical History  Surgical History[2]     Social History  She reports that she has quit smoking. Her smoking use included cigarettes. She started smoking about 53 years ago. She has been exposed to tobacco smoke. She has never used smokeless tobacco. She reports that she does not currently use alcohol.  She reports current drug use. Drug: Marijuana.    Family History  Family History[3]     Allergies  Cyclobenzaprine, Gabapentin, Iodinated contrast media, Sulfa (sulfonamide antibiotics), Sumatriptan, Adhesive tape-silicones, Sertraline, Adhesive, Latex, and Metoprolol    Review of Systems     10 point ROS negative except as noted above in the HPI      Physical Exam  Constitutional:       General: She is awake. She is not in acute distress.     Appearance: Normal appearance. She is not toxic-appearing.   HENT:      Head: Atraumatic.      Nose: Nose normal.      Mouth/Throat:      Mouth: Mucous membranes are moist.   Eyes:      Conjunctiva/sclera: Conjunctivae normal.      Pupils: Pupils are equal, round, and reactive to light.   Cardiovascular:      Rate and Rhythm: Normal rate and regular rhythm.      Pulses: Normal pulses.      Heart sounds: No murmur heard.  Pulmonary:      Effort: Pulmonary effort is normal. No respiratory distress.      Breath sounds: Normal breath sounds.   Abdominal:      General: Bowel sounds are increased. There is no distension.      Palpations: Abdomen is soft.      Tenderness: There is no abdominal tenderness. There is no guarding or rebound.   Musculoskeletal:         General: No swelling, deformity or signs of injury.      Cervical back: Neck supple.      Right lower leg: No edema.      Left lower leg: No edema.      Comments: Bruising noted on BLE. Unable to lift legs up off the bed for longer to 2-5 seconds.   Skin:     General: Skin is warm and dry.      Capillary Refill: Capillary refill takes less than 2 seconds.      Findings: No ecchymosis, erythema or wound.   Neurological:      General: No focal deficit present.      Mental Status: She is alert and oriented to person, place, and time.   Psychiatric:         Attention and Perception: Attention normal.         Mood and Affect: Mood and affect normal.         Speech: Speech normal.         Behavior: Behavior is cooperative.          Cognition and Memory: Cognition normal.          Last Recorded Vitals  Blood pressure 109/62, pulse 69, temperature 37.3 °C (99.1 °F), temperature source Tympanic, resp. rate 16, weight 65.3 kg (144 lb), SpO2 100%.    Relevant Results      Labs Reviewed   CBC WITH AUTO DIFFERENTIAL - Abnormal       Result Value    WBC 5.5      nRBC 0.0      RBC 4.46      Hemoglobin 12.2      Hematocrit 34.9 (*)     MCV 78 (*)     MCH 27.4      MCHC 35.0      RDW 15.9 (*)     Platelets 202      Neutrophils % 47.0      Immature Granulocytes %, Automated 0.4      Lymphocytes % 42.8      Monocytes % 8.7      Eosinophils % 0.9      Basophils % 0.2      Neutrophils Absolute 2.58      Immature Granulocytes Absolute, Automated 0.02      Lymphocytes Absolute 2.35      Monocytes Absolute 0.48      Eosinophils Absolute 0.05      Basophils Absolute 0.01     COMPREHENSIVE METABOLIC PANEL - Abnormal    Glucose 102 (*)     Sodium 141      Potassium 3.9      Chloride 105      Bicarbonate 29      Anion Gap 11      Urea Nitrogen 16      Creatinine 0.90      eGFR 68      Calcium 9.6      Albumin 4.0      Alkaline Phosphatase 73      Total Protein 7.9      AST 19      Bilirubin, Total 0.6      ALT 9     TROPONIN I, HIGH SENSITIVITY - Abnormal    Troponin I, High Sensitivity 47 (*)     Narrative:     Less than 99th percentile of normal range cutoff-  Female and children under 18 years old <14 ng/L; Male <21 ng/L: Negative  Repeat testing should be performed if clinically indicated.     Female and children under 18 years old 14-50 ng/L; Male 21-50 ng/L:  Consistent with possible cardiac damage and possible increased clinical   risk. Serial measurements may help to assess extent of myocardial damage.     >50 ng/L: Consistent with cardiac damage, increased clinical risk and  myocardial infarction. Serial measurements may help assess extent of   myocardial damage.      NOTE: Children less than 1 year old may have higher baseline troponin   levels and  results should be interpreted in conjunction with the overall   clinical context.     NOTE: Troponin I testing is performed using a different   testing methodology at [facility] than at other   [facility]. Direct result comparisons should only   be made within the same method.   TSH WITH REFLEX TO FREE T4 IF ABNORMAL - Abnormal    Thyroid Stimulating Hormone 21.72 (*)     Narrative:     TSH testing is performed using different testing methodology at [facility] than at other [facility]. Direct result comparisons should only be made within the same method.     URINALYSIS WITH REFLEX MICROSCOPIC - Abnormal    Color, Urine Yellow      Appearance, Urine Clear      Specific Gravity, Urine 1.021      pH, Urine 6.0      Protein, Urine 20 (TRACE)      Glucose, Urine Normal      Blood, Urine NEGATIVE      Ketones, Urine NEGATIVE      Bilirubin, Urine NEGATIVE      Urobilinogen, Urine 2 (1+) (*)     Nitrite, Urine NEGATIVE      Leukocyte Esterase, Urine NEGATIVE     RETICULOCYTES - Abnormal    Retic % 0.8      Retic Absolute 0.036      Reticulocyte Hemoglobin 27 (*)     Immature Retic fraction 7.8     TROPONIN I, HIGH SENSITIVITY - Abnormal    Troponin I, High Sensitivity 52 (*)     Narrative:     Less than 99th percentile of normal range cutoff-  Female and children under 18 years old <14 ng/L; Male <21 ng/L: Negative  Repeat testing should be performed if clinically indicated.     Female and children under 18 years old 14-50 ng/L; Male 21-50 ng/L:  Consistent with possible cardiac damage and possible increased clinical   risk. Serial measurements may help to assess extent of myocardial damage.     >50 ng/L: Consistent with cardiac damage, increased clinical risk and  myocardial infarction. Serial measurements may help assess extent of   myocardial damage.      NOTE: Children less than 1 year old may have higher baseline troponin   levels and results should be interpreted in conjunction  with the overall   clinical context.     NOTE: Troponin I testing is performed using a different   testing methodology at [facility] than at other   [facility]. Direct result comparisons should only   be made within the same method.   MICROSCOPIC ONLY, URINE - Abnormal    WBC, Urine 1-5      RBC, Urine 1-2      Squamous Epithelial Cells, Urine 1-9 (SPARSE)      Mucus, Urine 1+      Hyaline Casts, Urine 3+ (*)     Amorphous Crystals, Urine 1+     SEDIMENTATION RATE, AUTOMATED - Abnormal    Sedimentation Rate 34 (*)    C-REACTIVE PROTEIN - Abnormal    C-Reactive Protein 1.25 (*)    INFLUENZA A AND B PCR - Normal    Flu A Result Not Detected      Flu B Result Not Detected      Narrative:     This assay is an in vitro diagnostic multiplex nucleic acid amplification test for the detection and discrimination of Influenza A & B from nasopharyngeal specimens, and has been validated for use at [facility]. Negative results do not preclude Influenza A/B infections, and should not be used as the sole basis for diagnosis, treatment, or other management decisions. If Influenza A/B and RSV PCR results are negative, testing for Parainfluenza virus, Adenovirus and Metapneumovirus is routinely performed for [facility] and intensive care inpatients, and is available on other patients by placing an add-on request.   SARS-COV-2 PCR - Normal    Coronavirus 2019, PCR Not Detected      Narrative:     This assay is an FDA-cleared, in vitro diagnostic nucleic acid amplification test for the qualitative detection and differentiation of SARS CoV-2 from nasopharyngeal specimens collected from individuals with signs and symptoms of respiratory tract infections, and has been validated for use at [facility]. Negative results do not preclude COVID-19 infections and should not be used as the sole basis for diagnosis, treatment, or other management decisions. Testing for  SARS CoV-2 is recommended only for patients who meet current clinical and/or epidemiological criteria defined by federal, state, or local public health directives.   THYROXINE, FREE - Normal    Thyroxine, Free 0.64      Narrative:     Thyroxine Free testing is performed using different testing methodology at [facility] than at other [facility] hospitals. Direct result comparisons should only be made within the same method.   URINE CULTURE       CT head wo IV contrast   Final Result   No without acute intracranial process.        Signed by: William Shane 6/18/2025 3:36 PM   Dictation workstation:   [id number]      XR chest 1 view   Final Result   No radiographic evidence of acute cardiopulmonary abnormality.             MACRO:   None        Signed by: Ta Avila 6/18/2025 3:12 PM   Dictation workstation:   [id number]      CT chest abdomen pelvis w IV contrast    (Results Pending)   CT cervical spine w IV contrast    (Results Pending)   CT lumbar spine retrospective reconstruction protocol    (Results Pending)   CT thoracic spine retrospective reconstruction protocol    (Results Pending)          Assessment & Plan  Lower extremity weakness  Change in bowel movement  Unintentional weight loss  Dysphagia, unspecified  Vision changes  History of migraine  Elevated troponin level  Allergy to imaging contrast media  Esophageal dysmotility  -Telemetry monitoring  -metabolic work up unrevealing  - Pan positive review of systems  - Change in bowel pattern  as well as bilateral lower extremity weakness for cauda equina/nerve compression.  Obtain CT scans of the axial spine.   -Neurology consult, appreciate input  - CT chest abdomen pelvis with IV contrast to further evaluate for possible etiologies for weight loss  - Clear liquid diet, n.p.o. after midnight  - GI consult, appreciate input may benefit from EGD +/-colonoscopy given  family history   - Elevated troponin, likely demand ischemia as patient has  been having chest pain we will repeat a third troponin.    - ESR and CRP very mildly elevated, low suspicion for lupus flare  - metabolic work up unrevealing       Chronic issues:   #COPD   #Hypertension   #Hyperlipidemia   #TIA   #Arthritis   #SLE   #Hemoglobin C trait   #Osteoarthritis   #Hypothyroidism   #Migraines      Continue with patient's home medications as appropriate once entered by nursing  Supplemental oxygen at night and as needed    #Dvt ppx  Lovenox subcutaneous  SCD    Nik ABEL Adan, APRN-CNP         [1]   Past Medical History:  Diagnosis Date    Abnormal weight gain 02/05/2016    Weight gain    Anxiety     Bipolar disorder, unspecified (Multi) 02/05/2016    Bipolar disorder    Migraines     Old myocardial infarction 11/19/2019    History of myocardial infarction    Other fatigue 02/05/2016    Tired    Personal history of (corrected) congenital malformations of heart and circulatory system     History of congenital anomaly of heart    Personal history of other diseases of the circulatory system 02/05/2016    History of congestive heart failure    Personal history of other diseases of the nervous system and sense organs 11/06/2019    History of migraine    Personal history of other diseases of the respiratory system 11/19/2019    History of chronic obstructive lung disease    Personal history of other diseases of the respiratory system 02/05/2016    History of bronchitis    Personal history of other diseases of the respiratory system 02/05/2016    History of asthma    Personal history of other endocrine, nutritional and metabolic disease 10/30/2015    History of hypercholesterolemia    Personal history of other malignant neoplasm of skin 02/05/2016    History of malignant neoplasm of skin    Personal history of other mental and behavioral disorders 02/05/2016    History of depression    Personal history of pneumonia (recurrent) 02/05/2016    History of pneumonia    Personal history of transient  ischemic attack (TIA), and cerebral infarction without residual deficits 2016    History of stroke    Personal history of transient ischemic attack (TIA), and cerebral infarction without residual deficits 2016    History of transient cerebral ischemia   [2]   Past Surgical History:  Procedure Laterality Date    BREAST SURGERY  10/30/2015    Breast Surgery     SECTION, CLASSIC  10/30/2015     Section    FOOT SURGERY  10/30/2015    Foot Surgery    HYSTERECTOMY  10/30/2015    Hysterectomy    MR HEAD ANGIO WO IV CONTRAST  9/3/2015    MR HEAD ANGIO WO IV CONTRAST 9/3/2015 [facility]    MR HEAD ANGIO WO IV CONTRAST  2019    MR HEAD ANGIO WO IV CONTRAST 2019 [facility]ICAL [facility]GAC[facility]    MR HEAD ANGIO WO IV CONTRAST  2019    MR HEAD ANGIO WO IV CONTRAST 2019 [facility]    MR HEAD ANGIO WO IV CONTRAST  2021    MR HEAD ANGIO WO IV CONTRAST 2021 [facility] CLINICAL LEGACY    MR HEAD ANGIO WO IV CONTRAST  2021    MR HEAD ANGIO WO IV CONTRAST 2021 [facility]    MR NECK ANGIO WO IV CONTRAST  2019    MR NECK ANGIO WO IV CONTRAST 2019 [facility] CLINICAL LEGACY    MR NECK ANGIO WO IV CONTRAST  2021    MR NECK ANGIO WO IV CONTRAST 2021 [facility] CLINICAL LEGACY    OTHER SURGICAL HISTORY  2022    Cardiac catheterization    OTHER SURGICAL HISTORY  2019    Excision melanoma    US ASPIRATION INJECTION INTERMEDIATE JOINT  9/3/2020    US ASPIRATION INJECTION INTERMEDIATE JOINT 9/3/2020 [facility]    US ASPIRATION INJECTION INTERMEDIATE JOINT  2020    US ASPIRATION INJECTION INTERMEDIATE JOINT 2020 [facility]    US ASPIRATION INJECTION INTERMEDIATE JOINT  2021    US ASPIRATION INJECTION INTERMEDIATE JOINT 2021 [facility]    US ASPIRATION INJECTION INTERMEDIATE JOINT  2021    US ASPIRATION INJECTION INTERMEDIATE JOINT 2021 [facility]    US ASPIRATION INJECTION INTERMEDIATE  JOINT  5/28/2021    US ASPIRATION INJECTION INTERMEDIATE JOINT 5/28/2021 [facility]   [3]   Family History  Problem Relation Name Age of Onset    Cancer Mother      Hypertension Mother      Diabetes Mother      Cancer Father

## 2025-06-19 NOTE — CONSULTS
Reason For Consult  Dysphagia/unintentional wt loss/change in bowel habit      This note was created using voice recognition transcription software. Despite proofreading, unintentional typographical errors may be present. Please contact the GI office with any questions or concerns.       This is a 68 yo Female with a PMH of HTN, HLD, CAD, COPD, TIA, arthritis, SLE, hemoglobin C trait, OA, ROM, s/p hemorrhoidectomy, osteoporosis, MI, GERD, HH, diverticulosis, avascular necrosis of bilateral hips, hypothyroidism, epilepsy, and migraines who [facility] with reports of lower extremity weakness/headache/dysphagia, change in bowel pattern, and unintentional weight loss.  GI was consulted for dysphagia/unintentional wt loss/change in bowel habit.  The patient was following with Dr. Danial Fuentes @ [facility].  Patient lives with her daughter and grandchildren.        Subjective  Solid/liquid dysphagia for the last 1.5 months.  Had this problem a few months back.  Feels like food/liquid consumed are sitting in her esophagus and takes awhile to go down...thus slowing down consumption of food...thus the weight loss.  Appetite is good.    Bowel habits changed during this month and a half.  Normally has 3-5 BM's daily now having 1 a day.  Feels somewhat constipated.   States she had chest pain (this was communicated to the cardiology NP today).        Esophageal Impedance 23 showing normal LES pressure , ineffective esophageal motility,with fragmented peristalsis and poor bolus transit.  EGD- small HH, gastritis.  Colonoscopy- atrophic TI, diverticulosis, sigmoid and transverse polyps.  2020 diverticulosis.  Patient states she was getting them regularly for some time.  BM-Daily.  Normal consistency.  No bleeding. +Weight loss.  FHX-Mom  of colon CA age 50, sister had cervical CA  SHX-No tobacco/illicits/ETOH.  She does smoke marijuana and has been doing so for a long time per her.  Ab  Sx-C section, hysterectomy  NSAIDs-Denies         Allergies: as mentioned in H&P      A 10 point review of system is negative except for what is mentioned in the HPI    Vital Signs: Reviewed    Physical Exam:  General: Mildly anxious  Skin:  Warm and dry, no jaundice  HEENT: No scleral icterus, no conjunctival pallor, normocephalic, atraumatic, mucous membranes moist  Neck:  atraumatic, trachea midline, no JVD  Chest:  Clear to auscultation bilaterally. No wheezes, rales, or rhonchi  CV:  Regular rate and rhythm.  Positive S1/S2  Abdomen: no distension, +BS, soft, non-tender to palpation, no rebound tenderness, no guarding, no rigidity, no discernible ascites   Extremities: no lower extremity edema, chronic pigmentary changes, no cyanosis  Neurological:  A&Ox3  Psychiatric: cooperative     Investigations:  Labs, radiological imaging and cardiac work up were reviewed      Objective:         6/18/2025     8:00 PM 6/18/2025     9:17 PM 6/18/2025    10:21 PM 6/18/2025    10:24 PM 6/18/2025    10:25 PM 6/19/2025     3:43 AM 6/19/2025     8:11 AM   Vitals   Systolic  105 119 121 126 101 123   Diastolic  66 80 71 76 66 82   BP Location   Right arm Right arm Right arm Right arm    Heart Rate 69 80 78    62   Temp  36.6 °C (97.9 °F) 37.3 °C (99.1 °F) 37.1 °C (98.8 °F) 37.5 °C (99.5 °F) 36.5 °C (97.7 °F) 36.2 °C (97.2 °F)   Resp       16   Visit Report Report Report Report Report Report            Medications:  Scheduled Medications[1]     Recent Results (from the past 72 hours)   CBC and Auto Differential    Collection Time: 06/18/25  2:56 PM   Result Value Ref Range    WBC 5.5 4.4 - 11.3 x10*3/uL    nRBC 0.0 0.0 - 0.0 /100 WBCs    RBC 4.46 4.00 - 5.20 x10*6/uL    Hemoglobin 12.2 12.0 - 16.0 g/dL    Hematocrit 34.9 (L) 36.0 - 46.0 %    MCV 78 (L) 80 - 100 fL    MCH 27.4 26.0 - 34.0 pg    MCHC 35.0 32.0 - 36.0 g/dL    RDW 15.9 (H) 11.5 - 14.5 %    Platelets 202 150 - 450 x10*3/uL    Neutrophils % 47.0 40.0 - 80.0 %     Immature Granulocytes %, Automated 0.4 0.0 - 0.9 %    Lymphocytes % 42.8 13.0 - 44.0 %    Monocytes % 8.7 2.0 - 10.0 %    Eosinophils % 0.9 0.0 - 6.0 %    Basophils % 0.2 0.0 - 2.0 %    Neutrophils Absolute 2.58 1.60 - 5.50 x10*3/uL    Immature Granulocytes Absolute, Automated 0.02 0.00 - 0.50 x10*3/uL    Lymphocytes Absolute 2.35 0.80 - 3.00 x10*3/uL    Monocytes Absolute 0.48 0.05 - 0.80 x10*3/uL    Eosinophils Absolute 0.05 0.00 - 0.40 x10*3/uL    Basophils Absolute 0.01 0.00 - 0.10 x10*3/uL   Comprehensive Metabolic Panel    Collection Time: 06/18/25  2:56 PM   Result Value Ref Range    Glucose 102 (H) 74 - 99 mg/dL    Sodium 141 136 - 145 mmol/L    Potassium 3.9 3.5 - 5.3 mmol/L    Chloride 105 98 - 107 mmol/L    Bicarbonate 29 21 - 32 mmol/L    Anion Gap 11 10 - 20 mmol/L    Urea Nitrogen 16 6 - 23 mg/dL    Creatinine 0.90 0.50 - 1.05 mg/dL    eGFR 68 >60 mL/min/1.73m*2    Calcium 9.6 8.6 - 10.3 mg/dL    Albumin 4.0 3.4 - 5.0 g/dL    Alkaline Phosphatase 73 33 - 136 U/L    Total Protein 7.9 6.4 - 8.2 g/dL    AST 19 9 - 39 U/L    Bilirubin, Total 0.6 0.0 - 1.2 mg/dL    ALT 9 7 - 45 U/L   Troponin I, High Sensitivity    Collection Time: 06/18/25  2:56 PM   Result Value Ref Range    Troponin I, High Sensitivity 47 (H) 0 - 13 ng/L   Influenza A, and B PCR    Collection Time: 06/18/25  2:56 PM   Result Value Ref Range    Flu A Result Not Detected Not Detected    Flu B Result Not Detected Not Detected   Sars-CoV-2 PCR    Collection Time: 06/18/25  2:56 PM   Result Value Ref Range    Coronavirus 2019, PCR Not Detected Not Detected   TSH with reflex to Free T4 if abnormal    Collection Time: 06/18/25  2:56 PM   Result Value Ref Range    Thyroid Stimulating Hormone 21.72 (H) 0.44 - 3.98 mIU/L   Thyroxine, Free    Collection Time: 06/18/25  2:56 PM   Result Value Ref Range    Thyroxine, Free 0.64 0.61 - 1.12 ng/dL   Magnesium    Collection Time: 06/18/25  2:56 PM   Result Value Ref Range    Magnesium 2.13 1.60 - 2.40  mg/dL   Reticulocytes    Collection Time: 06/18/25  3:27 PM   Result Value Ref Range    Retic % 0.8 0.5 - 2.0 %    Retic Absolute 0.036 0.017 - 0.110 x10*6/uL    Reticulocyte Hemoglobin 27 (L) 28 - 38 pg    Immature Retic fraction 7.8 <=16.0 %   Sedimentation rate, automated    Collection Time: 06/18/25  3:27 PM   Result Value Ref Range    Sedimentation Rate 34 (H) 0 - 30 mm/h   ECG 12 Lead    Collection Time: 06/18/25  3:46 PM   Result Value Ref Range    Ventricular Rate 79 BPM    Atrial Rate 79 BPM    KY Interval 190 ms    QRS Duration 93 ms    QT Interval 344 ms    QTC Calculation(Bazett) 395 ms    P Axis 69 degrees    R Axis 264 degrees    T Axis 47 degrees    QRS Count 13 beats    Q Onset 249 ms    T Offset 421 ms    QTC Fredericia 377 ms   Urinalysis with Reflex Microscopic    Collection Time: 06/18/25  4:01 PM   Result Value Ref Range    Color, Urine Yellow Light-Yellow, Yellow, Dark-Yellow    Appearance, Urine Clear Clear    Specific Gravity, Urine 1.021 1.005 - 1.035    pH, Urine 6.0 5.0, 5.5, 6.0, 6.5, 7.0, 7.5, 8.0    Protein, Urine 20 (TRACE) NEGATIVE, 10 (TRACE), 20 (TRACE) mg/dL    Glucose, Urine Normal Normal mg/dL    Blood, Urine NEGATIVE NEGATIVE mg/dL    Ketones, Urine NEGATIVE NEGATIVE mg/dL    Bilirubin, Urine NEGATIVE NEGATIVE mg/dL    Urobilinogen, Urine 2 (1+) (A) Normal mg/dL    Nitrite, Urine NEGATIVE NEGATIVE    Leukocyte Esterase, Urine NEGATIVE NEGATIVE   Microscopic Only, Urine    Collection Time: 06/18/25  4:01 PM   Result Value Ref Range    WBC, Urine 1-5 1-5, NONE /HPF    RBC, Urine 1-2 NONE, 1-2, 3-5 /HPF    Squamous Epithelial Cells, Urine 1-9 (SPARSE) Reference range not established. /HPF    Mucus, Urine 1+ Reference range not established. /LPF    Hyaline Casts, Urine 3+ (A) NONE /LPF    Amorphous Crystals, Urine 1+ NONE, 1+, 2+ /HPF   Troponin I, High Sensitivity    Collection Time: 06/18/25  4:02 PM   Result Value Ref Range    Troponin I, High Sensitivity 52 (HH) 0 - 13 ng/L  "  C-reactive protein    Collection Time: 06/18/25  4:02 PM   Result Value Ref Range    C-Reactive Protein 1.25 (H) <1.00 mg/dL   Troponin I, High Sensitivity    Collection Time: 06/18/25  9:34 PM   Result Value Ref Range    Troponin I, High Sensitivity 67 (HH) 0 - 13 ng/L   CBC    Collection Time: 06/19/25  5:01 AM   Result Value Ref Range    WBC 3.3 (L) 4.4 - 11.3 x10*3/uL    nRBC 0.0 0.0 - 0.0 /100 WBCs    RBC 4.30 4.00 - 5.20 x10*6/uL    Hemoglobin 11.4 (L) 12.0 - 16.0 g/dL    Hematocrit 34.1 (L) 36.0 - 46.0 %    MCV 79 (L) 80 - 100 fL    MCH 26.5 26.0 - 34.0 pg    MCHC 33.4 32.0 - 36.0 g/dL    RDW 15.9 (H) 11.5 - 14.5 %    Platelets 204 150 - 450 x10*3/uL   Basic metabolic panel    Collection Time: 06/19/25  5:01 AM   Result Value Ref Range    Glucose 145 (H) 74 - 99 mg/dL    Sodium 141 136 - 145 mmol/L    Potassium 4.7 3.5 - 5.3 mmol/L    Chloride 104 98 - 107 mmol/L    Bicarbonate 29 21 - 32 mmol/L    Anion Gap 13 10 - 20 mmol/L    Urea Nitrogen 13 6 - 23 mg/dL    Creatinine 0.89 0.50 - 1.05 mg/dL    eGFR 69 >60 mL/min/1.73m*2    Calcium 9.6 8.6 - 10.3 mg/dL          Assessment:    This is a 68 yo Female with a PMH of HTN, HLD, CAD, COPD, TIA, arthritis, SLE, hemoglobin C trait, OA, ROM, s/p hemorrhoidectomy, osteoporosis, MI, GERD, HH, diverticulosis, avascular necrosis of bilateral hips, hypothyroidism, epilepsy, and migraines who [facility] with reports of lower extremity weakness/headache/dysphagia, change in bowel pattern, and unintentional weight loss.  GI was consulted for dysphagia/unintentional wt loss/change in bowel habit.  The patient was following with Dr. Danial Fuentes @ [facility] 7/13/22. Solid/liquid esophageal dysphagia x 1.5 months resulting in unintentional weight loss, and recent \"constipation\" (baseline 3-5 MB's daily now 1 daily).  Last EGD 2/2023 showing a small HH and gastritis.  She also had esophageal impedance showing showing normal LES pressure , ineffective " esophageal motility,with fragmented peristalsis and poor bolus transit. She does mention she had dysphagia at this time but didn't follow up because her GI doctor retired.  Mother  at age 50 from colon CA and patient states she keeps up with colonoscopies.  Last colonoscopy was  atrophic TI, diverticulosis, sigmoid and transverse polyps (bx hyperplastic/sessile serrated polyps).    Patient has some elevated troponins. Spoke to the Cardiology NP and she felt as if the patient had chronic atypical symptoms with recent normal stress test and she is clearing her for the EGD.  MCV 79    Neurology is consulted for lower extremity weakness/migraines/vision changes.      Plan  1.)  Dysphagia/unintentional wt loss/change in bowel habit-CT imaging pending.  Patient agreeable to undergo an EGD with manometry vs endofliup when she is done with her CT's.  Timing for today vs tomorrow based on anesthesia's availability.        Discussed above patient assessment and plan with Dr. Austin.    I spent 75 minutes in the professional and overall care of this patient.      25 at 9:53 AM - ALONDRA Roque-CNP          [1] ALPRAZolam, 0.5 mg, oral, Once  diphenhydrAMINE, 50 mg, intravenous, Once  enoxaparin, 40 mg, subcutaneous, q24h  hydrocortisone sodium succinate, 200 mg, intravenous, q6h  pantoprazole, 40 mg, intravenous, Daily  polyethylene glycol, 17 g, oral, Daily

## 2025-06-19 NOTE — H&P
History Of Present Illness  Florencia Wang is a 69 year old female with past medical history of  hypertension, hyperlipidemia, CAD (Nuclear medicine stress test 5/9/2025 normal), COPD on supplemental oxygen as needed, TIA, arthritis, SLE, hemoglobin C trait, osteoarthritis, avascular necrosis of  bilateral hips, hypothyroidism, epilepsy, and migraines. Patient presents with numerous complaints including lower ext weakness, headache, dysphagia, change in bowel pattern, and unintentional weight loss.      Extensive positive ROS including:  - Migraines/headaches -having them more frequently and have been more severe recently. Follows with Dr Paulson.  - Lightheadedness, but no syncope.  - feels like she is ”floating outside her body”  - Sinus congestion and a large amount of mucous production  - reports seeing floaters seen eyes open and closed, reports this started recently - “specks of white”. No acute vision loss.   - Dysphagia primarily with solids, feels like things are not going down. Tolerating fluids better. Has been only eating some fruit in the mornings and peanut butter sandwich in the evening. Poor Appetite. 10 pound unintentional weight loss x 3 weeks. History of esophageal motility issues, manometry done 02/2023  - reports stools are soft but she is unable to push them out and has to manually apply pressure around rectum to pass stool.  - History benign breast mass, feels as though it has returned in the left breast.  - No dysuria. Frequent urination at night.  - Bilateral lower extremity weakness. Reports difficulty with ambulation, has been staying mostly in bed. States needing to lift her legs with hands to move them in the bed. Also reports numbness in lower ext. but no saddle anesthesia  - was unable to decide on if she has been having chest pian, no palpitations.  - SOB. Worse with activity. More recently, has been wearing 2-3 L NC O2 at night and occasionally during the day. Reports wheezing.  -  Reports back pain, but states she can mentally block out the pain. Reports degenerative disk disease. Does not want back surgery.  - Reports bruising on lower extremities, no open lesions or wounds  - Denies recent new medications  - States current symptoms could be a lupus flare  -Code status is full code.    ER Course: Hemodynamically stable, afebrile.  EKG showed Sinus rhythm with PACs and left anterior fascicular block, right ventricular hypertrophy, no acute ST changes.  WBC 5.5, hemoglobin 12.2/hematocrit 34.9, microcytic, normochromic, platelets 202.  Glucose 102 otherwise CMP is normal.  High-sensitivity troponin 47 with repeat of 52.  UA for infection, 1+ urobilinogen.  Influenza and COVID-19 negative.  CT head without contrast negative for acute process. CXR showed no acute findings.    Past medical history: As above  Past surgical history: Breast surgery (benign mass),  section, excision of melanoma, foot surgery, left hand surgery, hysterectomy  Family history: CVA, cancer  Social history: Former smoker, quit ~ 11 years ago. History of marijuana use - c    EGD 2023  Impression:              - Small hiatal hernia.  - Normal esophagus.  - Gastritis. Biopsied.  - Normal examined duodenum.    2022  Impression:           - Normal oropharynx.   - Small hiatus hernia otherwise normal esophagus.   - Erythematous mucosa in the antrum. Biopsied for H. Pylori.   - Normal examined duodenum.    Esophageal Manometry   Impression:              - Manometry indicating ineffective esophageal motility with fragmented peristalsis and poor bolus transit .       Past Medical History  Medical History[1]    Surgical History  Surgical History[2]     Social History  She reports that she has quit smoking. Her smoking use included cigarettes. She started smoking about 53 years ago. She has been exposed to tobacco smoke. She has never used smokeless tobacco. She reports that she does not currently use alcohol.  She reports current drug use. Drug: Marijuana.    Family History  Family History[3]     Allergies  Cyclobenzaprine, Gabapentin, Iodinated contrast media, Sulfa (sulfonamide antibiotics), Sumatriptan, Adhesive tape-silicones, Sertraline, Adhesive, Latex, and Metoprolol    Review of Systems     10 point ROS negative except as noted above in the HPI      Physical Exam  Constitutional:       General: She is awake. She is not in acute distress.     Appearance: Normal appearance. She is not toxic-appearing.   HENT:      Head: Atraumatic.      Nose: Nose normal.      Mouth/Throat:      Mouth: Mucous membranes are moist.   Eyes:      Conjunctiva/sclera: Conjunctivae normal.      Pupils: Pupils are equal, round, and reactive to light.   Cardiovascular:      Rate and Rhythm: Normal rate and regular rhythm.      Pulses: Normal pulses.      Heart sounds: No murmur heard.  Pulmonary:      Effort: Pulmonary effort is normal. No respiratory distress.      Breath sounds: Normal breath sounds.   Abdominal:      General: Bowel sounds are increased. There is no distension.      Palpations: Abdomen is soft.      Tenderness: There is no abdominal tenderness. There is no guarding or rebound.   Musculoskeletal:         General: No swelling, deformity or signs of injury.      Cervical back: Neck supple.      Right lower leg: No edema.      Left lower leg: No edema.      Comments: Bruising noted on BLE. Unable to lift legs up off the bed for longer to 2-5 seconds.   Skin:     General: Skin is warm and dry.      Capillary Refill: Capillary refill takes less than 2 seconds.      Findings: No ecchymosis, erythema or wound.   Neurological:      General: No focal deficit present.      Mental Status: She is alert and oriented to person, place, and time.   Psychiatric:         Attention and Perception: Attention normal.         Mood and Affect: Mood and affect normal.         Speech: Speech normal.         Behavior: Behavior is cooperative.          Cognition and Memory: Cognition normal.          Last Recorded Vitals  Blood pressure 123/82, pulse 62, temperature 36.2 °C (97.2 °F), temperature source Temporal, resp. rate 16, weight 65.3 kg (144 lb), SpO2 97%.    Relevant Results      Labs Reviewed   CBC WITH AUTO DIFFERENTIAL - Abnormal       Result Value    WBC 5.5      nRBC 0.0      RBC 4.46      Hemoglobin 12.2      Hematocrit 34.9 (*)     MCV 78 (*)     MCH 27.4      MCHC 35.0      RDW 15.9 (*)     Platelets 202      Neutrophils % 47.0      Immature Granulocytes %, Automated 0.4      Lymphocytes % 42.8      Monocytes % 8.7      Eosinophils % 0.9      Basophils % 0.2      Neutrophils Absolute 2.58      Immature Granulocytes Absolute, Automated 0.02      Lymphocytes Absolute 2.35      Monocytes Absolute 0.48      Eosinophils Absolute 0.05      Basophils Absolute 0.01     COMPREHENSIVE METABOLIC PANEL - Abnormal    Glucose 102 (*)     Sodium 141      Potassium 3.9      Chloride 105      Bicarbonate 29      Anion Gap 11      Urea Nitrogen 16      Creatinine 0.90      eGFR 68      Calcium 9.6      Albumin 4.0      Alkaline Phosphatase 73      Total Protein 7.9      AST 19      Bilirubin, Total 0.6      ALT 9     TROPONIN I, HIGH SENSITIVITY - Abnormal    Troponin I, High Sensitivity 47 (*)     Narrative:     Less than 99th percentile of normal range cutoff-  Female and children under 18 years old <14 ng/L; Male <21 ng/L: Negative  Repeat testing should be performed if clinically indicated.     Female and children under 18 years old 14-50 ng/L; Male 21-50 ng/L:  Consistent with possible cardiac damage and possible increased clinical   risk. Serial measurements may help to assess extent of myocardial damage.     >50 ng/L: Consistent with cardiac damage, increased clinical risk and  myocardial infarction. Serial measurements may help assess extent of   myocardial damage.      NOTE: Children less than 1 year old may have higher baseline troponin   levels and  results should be interpreted in conjunction with the overall   clinical context.     NOTE: Troponin I testing is performed using a different   testing methodology at [facility] than at other   [facility]. Direct result comparisons should only   be made within the same method.   TSH WITH REFLEX TO FREE T4 IF ABNORMAL - Abnormal    Thyroid Stimulating Hormone 21.72 (*)     Narrative:     TSH testing is performed using different testing methodology at [facility] than at other [facility]. Direct result comparisons should only be made within the same method.     URINALYSIS WITH REFLEX MICROSCOPIC - Abnormal    Color, Urine Yellow      Appearance, Urine Clear      Specific Gravity, Urine 1.021      pH, Urine 6.0      Protein, Urine 20 (TRACE)      Glucose, Urine Normal      Blood, Urine NEGATIVE      Ketones, Urine NEGATIVE      Bilirubin, Urine NEGATIVE      Urobilinogen, Urine 2 (1+) (*)     Nitrite, Urine NEGATIVE      Leukocyte Esterase, Urine NEGATIVE     RETICULOCYTES - Abnormal    Retic % 0.8      Retic Absolute 0.036      Reticulocyte Hemoglobin 27 (*)     Immature Retic fraction 7.8     TROPONIN I, HIGH SENSITIVITY - Abnormal    Troponin I, High Sensitivity 52 (*)     Narrative:     Less than 99th percentile of normal range cutoff-  Female and children under 18 years old <14 ng/L; Male <21 ng/L: Negative  Repeat testing should be performed if clinically indicated.     Female and children under 18 years old 14-50 ng/L; Male 21-50 ng/L:  Consistent with possible cardiac damage and possible increased clinical   risk. Serial measurements may help to assess extent of myocardial damage.     >50 ng/L: Consistent with cardiac damage, increased clinical risk and  myocardial infarction. Serial measurements may help assess extent of   myocardial damage.      NOTE: Children less than 1 year old may have higher baseline troponin   levels and results should be interpreted in conjunction  with the overall   clinical context.     NOTE: Troponin I testing is performed using a different   testing methodology at [facility] than at other   [facility]. Direct result comparisons should only   be made within the same method.   MICROSCOPIC ONLY, URINE - Abnormal    WBC, Urine 1-5      RBC, Urine 1-2      Squamous Epithelial Cells, Urine 1-9 (SPARSE)      Mucus, Urine 1+      Hyaline Casts, Urine 3+ (*)     Amorphous Crystals, Urine 1+     SEDIMENTATION RATE, AUTOMATED - Abnormal    Sedimentation Rate 34 (*)    C-REACTIVE PROTEIN - Abnormal    C-Reactive Protein 1.25 (*)    CBC - Abnormal    WBC 3.3 (*)     nRBC 0.0      RBC 4.30      Hemoglobin 11.4 (*)     Hematocrit 34.1 (*)     MCV 79 (*)     MCH 26.5      MCHC 33.4      RDW 15.9 (*)     Platelets 204     BASIC METABOLIC PANEL - Abnormal    Glucose 145 (*)     Sodium 141      Potassium 4.7      Chloride 104      Bicarbonate 29      Anion Gap 13      Urea Nitrogen 13      Creatinine 0.89      eGFR 69      Calcium 9.6     TROPONIN I, HIGH SENSITIVITY - Abnormal    Troponin I, High Sensitivity 67 (*)     Narrative:     Less than 99th percentile of normal range cutoff-  Female and children under 18 years old <14 ng/L; Male <21 ng/L: Negative  Repeat testing should be performed if clinically indicated.     Female and children under 18 years old 14-50 ng/L; Male 21-50 ng/L:  Consistent with possible cardiac damage and possible increased clinical   risk. Serial measurements may help to assess extent of myocardial damage.     >50 ng/L: Consistent with cardiac damage, increased clinical risk and  myocardial infarction. Serial measurements may help assess extent of   myocardial damage.      NOTE: Children less than 1 year old may have higher baseline troponin   levels and results should be interpreted in conjunction with the overall   clinical context.     NOTE: Troponin I testing is performed using a different   testing methodology at [facility] than at [facility]. Direct result comparisons should only   be made within the same method.   INFLUENZA A AND B PCR - Normal    Flu A Result Not Detected      Flu B Result Not Detected      Narrative:     This assay is an in vitro diagnostic multiplex nucleic acid amplification test for the detection and discrimination of Influenza A & B from nasopharyngeal specimens, and has been validated for use at [facility]. Negative results do not preclude Influenza A/B infections, and should not be used as the sole basis for diagnosis, treatment, or other management decisions. If Influenza A/B and RSV PCR results are negative, testing for Parainfluenza virus, Adenovirus and Metapneumovirus is routinely performed for [facility] pediatric oncology and intensive care inpatients, and is available on other patients by placing an add-on request.   SARS-COV-2 PCR - Normal    Coronavirus 2019, PCR Not Detected      Narrative:     This assay is an FDA-cleared, in vitro diagnostic nucleic acid amplification test for the qualitative detection and differentiation of SARS CoV-2 from nasopharyngeal specimens collected from individuals with signs and symptoms of respiratory tract infections, and has been validated for use at [facility]. Negative results do not preclude COVID-19 infections and should not be used as the sole basis for diagnosis, treatment, or other management decisions. Testing for SARS CoV-2 is recommended only for patients who meet current clinical and/or epidemiological criteria defined by federal, state, or local public health directives.   THYROXINE, FREE - Normal    Thyroxine, Free 0.64      Narrative:     Thyroxine Free testing is performed using different testing methodology at [facility] than at [facility]. Direct result comparisons should only be made within the same method.   MAGNESIUM - Normal    Magnesium 2.13      URINE CULTURE       CT head wo IV contrast   Final Result   No without acute intracranial process.        Signed by: William Shane 6/18/2025 3:36 PM   Dictation workstation:   [id number]      XR chest 1 view   Final Result   No radiographic evidence of acute cardiopulmonary abnormality.             MACRO:   None        Signed by: Ta Avila 6/18/2025 3:12 PM   Dictation workstation:   [id number]      CT chest abdomen pelvis w IV contrast    (Results Pending)   CT cervical spine w IV contrast    (Results Pending)   CT lumbar spine retrospective reconstruction protocol    (Results Pending)   CT thoracic spine retrospective reconstruction protocol    (Results Pending)          Assessment & Plan  Generalized weakness    History of migraine    Elevated troponin level    Dysphagia, unspecified    Unintentional weight loss    Lower extremity weakness    Change in bowel movement    Vision changes    Allergy to imaging contrast media    Esophageal dysmotility    Chronic issues:   #COPD   #Hypertension   #Hyperlipidemia   #TIA   #Arthritis   #SLE   #Hemoglobin C trait   #Osteoarthritis   #Hypothyroidism   #Migraines      Continue with patient's home medications as appropriate once entered by nursing  Supplemental oxygen at night and as needed    #Dvt ppx  Lovenox subcutaneous  SCD    Toribio Ice, DO         [1]   Past Medical History:  Diagnosis Date    Abnormal weight gain 02/05/2016    Weight gain    Anxiety     Bipolar disorder, unspecified (Multi) 02/05/2016    Bipolar disorder    Migraines     Old myocardial infarction 11/19/2019    History of myocardial infarction    Other fatigue 02/05/2016    Tired    Personal history of (corrected) congenital malformations of heart and circulatory system     History of congenital anomaly of heart    Personal history of other diseases of the circulatory system 02/05/2016    History of congestive heart failure    Personal history of other diseases of the nervous system and  sense organs 2019    History of migraine    Personal history of other diseases of the respiratory system 2019    History of chronic obstructive lung disease    Personal history of other diseases of the respiratory system 2016    History of bronchitis    Personal history of other diseases of the respiratory system 2016    History of asthma    Personal history of other endocrine, nutritional and metabolic disease 10/30/2015    History of hypercholesterolemia    Personal history of other malignant neoplasm of skin 2016    History of malignant neoplasm of skin    Personal history of other mental and behavioral disorders 2016    History of depression    Personal history of pneumonia (recurrent) 2016    History of pneumonia    Personal history of transient ischemic attack (TIA), and cerebral infarction without residual deficits 2016    History of stroke    Personal history of transient ischemic attack (TIA), and cerebral infarction without residual deficits 2016    History of transient cerebral ischemia   [2]   Past Surgical History:  Procedure Laterality Date    BREAST SURGERY  10/30/2015    Breast Surgery     SECTION, CLASSIC  10/30/2015     Section    FOOT SURGERY  10/30/2015    Foot Surgery    HYSTERECTOMY  10/30/2015    Hysterectomy    MR HEAD ANGIO WO IV CONTRAST  9/3/2015    MR HEAD ANGIO WO IV CONTRAST 9/3/2015 [facility]GAC[facility]    MR HEAD ANGIO WO IV CONTRAST  2019    MR HEAD ANGIO WO IV CONTRAST 2019 [facility] CLINICAL LEGACY    MR HEAD ANGIO WO IV CONTRAST  2019    MR HEAD ANGIO WO IV CONTRAST 2019 [facility]    MR HEAD ANGIO WO IV CONTRAST  2021    MR HEAD ANGIO WO IV CONTRAST 2021 [facility] CLINICAL LEGACY    MR HEAD ANGIO WO IV CONTRAST  2021    MR HEAD ANGIO WO IV CONTRAST 2021 [facility]    MR NECK ANGIO WO IV CONTRAST  2019    MR NECK ANGIO WO IV CONTRAST 2019 [facility] CLINICAL  LEGACY    MR NECK ANGIO WO IV CONTRAST  2/2/2021    MR NECK ANGIO WO IV CONTRAST 2/2/2021 [facility] CLINICAL LEGACY    OTHER SURGICAL HISTORY  07/13/2022    Cardiac catheterization    OTHER SURGICAL HISTORY  03/11/2019    Excision melanoma    US ASPIRATION INJECTION INTERMEDIATE JOINT  9/3/2020    US ASPIRATION INJECTION INTERMEDIATE JOINT 9/3/2020 [facility]    US ASPIRATION INJECTION INTERMEDIATE JOINT  9/21/2020    US ASPIRATION INJECTION INTERMEDIATE JOINT 9/21/2020 [facility]    US ASPIRATION INJECTION INTERMEDIATE JOINT  1/8/2021    US ASPIRATION INJECTION INTERMEDIATE JOINT 1/8/2021 [facility]    US ASPIRATION INJECTION INTERMEDIATE JOINT  5/28/2021    US ASPIRATION INJECTION INTERMEDIATE JOINT 5/28/2021 [facility]    US ASPIRATION INJECTION INTERMEDIATE JOINT  5/28/2021    US ASPIRATION INJECTION INTERMEDIATE JOINT 5/28/2021 [facility]   [3]   Family History  Problem Relation Name Age of Onset    Cancer Mother      Hypertension Mother      Diabetes Mother      Cancer Father

## 2025-06-19 NOTE — CARE PLAN
The patient's goals for the shift include      The clinical goals for the shift include maintain safety and comfort    Over the shift, the patient is going to be monitored by vital signs

## 2025-06-19 NOTE — PROGRESS NOTES
Pharmacy Medication History Review    Florencia Wang is a 69 y.o. female admitted for Generalized weakness. Pharmacy reviewed the patient's [id number] medications and allergies for accuracy.    The list below reflectives the updated PTA list. Please review each medication in order reconciliation for additional clarification and justification.  Medications Prior to Admission   Medication Sig Dispense Refill Last Dose/Taking    albuterol 90 mcg/actuation inhaler INHALE 2 PUFFS EVERY 4 HOURS IF NEEDED FOR SHORTNESS OF BREATH OR WHEEZING. 8.5 g 3 Past Week    ascorbic acid (Vitamin C) 500 mg tablet Take 1 tablet (500 mg) by mouth once daily.   Past Week    aspirin 81 mg chewable tablet CHEW 1 TABLET (81 MG) ONCE DAILY. (Patient taking differently: Chew 1 tablet (81 mg) once a day on Sunday, Tuesday, Thursday, and Saturday.) 90 tablet 2 Past Week    atorvastatin (Lipitor) 20 mg tablet Take 1 tablet (20 mg) by mouth once daily. 90 tablet 3 Past Week    BLACK SEED ORAL Take 1 capsule by mouth once daily.   Past Week    DANDELION ROOT ORAL Take 1 capsule by mouth once daily.   Past Week    DULoxetine (Cymbalta) 60 mg DR capsule TAKE 1 CAPSULE (60 MG) BY MOUTH ONCE DAILY. 30 capsule 2 Past Week    esomeprazole (NexIUM) 40 mg DR capsule TAKE 1 CAPSULE BY MOUTH EVERY DAY (Patient taking differently: Take 1 capsule (40 mg) by mouth once daily in the morning. Take before meals.) 30 capsule 0 Past Week    fluticasone (Flonase) 50 mcg/actuation nasal spray Administer 1 spray into each nostril once daily. Shake gently. Before first use, prime pump. After use, clean tip and replace cap. (Patient taking differently: Administer 1 spray into each nostril once daily as needed for rhinitis. Shake gently. Before first use, prime pump. After use, clean tip and replace cap.) 16 g 5 Past Week    hydrocortisone 2.5 % cream Apply topically 2 times a day as needed for irritation or rash. 30 g 5 Unknown    hydroxychloroquine (Plaquenil)  200 mg tablet TAKE 1 TABLET (200 MG) BY MOUTH ONCE DAILY. 30 tablet 2 Past Week    levothyroxine (Synthroid, Levoxyl) 100 mcg tablet Take 1 tablet (100 mcg) by mouth early in the morning.. Take on an empty stomach at the same time each day, either 30 to 60 minutes prior to breakfast   Past Week    nitroglycerin (Nitrostat) 0.4 mg SL tablet Place 1 tablet (0.4 mg) under the tongue every 5 minutes if needed for chest pain.   Unknown    Nurtec ODT 75 mg tablet,disintegrating TAKE 1 TABLET (75 MG) BY MOUTH EVERY OTHER DAY. (Patient taking differently: Dissolve 1 tablet (75 mg) in the mouth every other day if needed (migraine onset).) 10 tablet 1 Unknown    oregano oiL 1,500 mg capsule Take 1 capsule by mouth once daily.   Past Week    potassium chloride CR 20 mEq ER tablet TAKE 1 TABLET (20 MEQ) BY MOUTH ONCE DAILY. 30 tablet 2 Past Week    predniSONE (Deltasone) 10 mg tablet TAKE 1 TABLET (10 MG) BY MOUTH EVERY OTHER DAY. (Patient taking differently: Take 1 tablet (10 mg) by mouth once daily as needed (inflammation).) 15 tablet 2 Unknown    tiZANidine (Zanaflex) 4 mg tablet TAKE 1 TABLET (4 MG) BY MOUTH 3 TIMES A DAY AS NEEDED FOR MUSCLE SPASMS. 90 tablet 2 Past Week    zolpidem (Ambien) 10 mg tablet Take 1 tablet (10 mg) by mouth once daily at bedtime. (Patient taking differently: Take 1 tablet (10 mg) by mouth as needed at bedtime for sleep.) 30 tablet 5 Past Week    albuterol 2.5 mg /3 mL (0.083 %) nebulizer solution Inhale 3 mL every 6 hours if needed for wheezing or shortness of breath. (Patient not taking: Reported on 6/19/2025)   Not Taking    divalproex (Depakote ER) 250 mg 24 hr tablet Take 1 tablet (250 mg) by mouth 3 times a day. Do not crush, chew, or split. (Patient not taking: Reported on 6/19/2025)   Not Taking    DULoxetine (Cymbalta) 30 mg DR capsule TAKE 1 CAPSULE (30 MG) BY MOUTH ONCE DAILY. DO NOT CRUSH OR CHEW. (Patient not taking: Reported on 6/19/2025) 30 capsule 11 Not Taking    ergocalciferol  (Vitamin D-2) 1.25 MG (78661 UT) capsule Take 1 capsule (1.25 mg) by mouth 1 (one) time per week. (Patient not taking: Reported on 6/19/2025)   Not Taking    esomeprazole (NexIUM) 40 mg DR capsule TAKE 1 CAPSULE BY MOUTH EVERY DAY (Patient not taking: Reported on 6/19/2025) 30 capsule 0 Not Taking    esomeprazole (NexIUM) 40 mg DR capsule TAKE 1 CAPSULE BY MOUTH EVERY DAY 30 capsule 0     furosemide (Lasix) 20 mg tablet Take 1 tablet (20 mg) by mouth 3 times a week. (Patient taking differently: Take 1 tablet (20 mg) by mouth once daily as needed (edema).)   Unknown    guaiFENesin (Mucinex) 600 mg 12 hr tablet Take 1 tablet (600 mg) by mouth every 12 hours if needed for cough or congestion. (Patient not taking: Reported on 6/19/2025)   Not Taking    HYDROcodone-acetaminophen (Norco) 5-325 mg tablet Take 1 tablet by mouth 3 times a day as needed for severe pain (7 - 10). (Patient not taking: Reported on 6/19/2025)   Not Taking    levothyroxine (Synthroid, Levoxyl) 137 mcg tablet TAKE 1 TABLET BY MOUTH EVERY DAY (Patient not taking: Reported on 6/19/2025) 30 tablet 2 Not Taking    metoprolol succinate XL (Toprol-XL) 25 mg 24 hr tablet Take 1 tablet (25 mg) by mouth once daily. (Patient not taking: Reported on 6/19/2025)   Not Taking    potassium chloride CR 20 mEq ER tablet TAKE 1 TABLET (20 MEQ) BY MOUTH ONCE DAILY. (Patient not taking: Reported on 6/19/2025) 30 tablet 1 Not Taking    tiZANidine (Zanaflex) 4 mg tablet TAKE 1 TABLET (4 MG) BY MOUTH 3 TIMES A DAY AS NEEDED FOR MUSCLE SPASMS. (Patient not taking: Reported on 6/19/2025) 90 tablet 1 Not Taking    tiZANidine (Zanaflex) 4 mg tablet Take 2 tablets (8 mg) by mouth every 8 hours if needed for muscle spasms. (Patient not taking: Reported on 6/18/2025) 90 tablet 0         The list below reflectives the updated allergy list. Please review each documented allergy for additional clarification and justification.  Allergies  Reviewed by Yokasta Laird RD on  6/19/2025        Severity Reactions Comments    Cyclobenzaprine High Other, Unknown Mini TIAs    Gabapentin High Other, Confusion Mental status change Slurred Speech    Iodinated Contrast Media High Swelling, Other     Sulfa (sulfonamide Antibiotics) High Hives, Swelling     Sumatriptan High Other stroke    Adhesive Tape-silicones Medium Rash, Other burns skin    Sertraline Medium Unknown     Latex Not Specified Unknown     Metoprolol Not Specified Unknown             Below are additional concerns with the patient's PTA list.    Patient reports actively taking Depakote, however last prescription fill per pharmacy ([facility]) in March 2024 for 90 day supply and no fill history with [facility]. Patient takes furosemide 20 mg as needed for edema and takes low dose aspirin 4 times weekly.    Jeniffer Briseno
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
## 2025-06-19 NOTE — TREATMENT PLAN
I saw the patient independently   I discussed the case with Ese HURLEY and reviewed her note  69-year-old female with hypertension/hyperlipidemia/nonobstructive coronary disease (30% percent mid LAD stenosis  on 2022 cath-NL 4/2025 NUC stress test)/sickle cell anemia presented with numerous complaints  including unintentional weight loss/change in bowel habits/chest pain.  We were consulted for preop evaluation prior to EGD.  Patient did have a mildly elevated troponin however it is flat and not associate with any ischemic EKG changes.  She has had mildly elevated troponins in the past- this may be related to her sickle cell disease.  -  At this point patient is stable for EGD from a cardiac standpoint.  She will appear to be low risk for any cardiac morbidity/mortality.  Proceed without need for additional testing.

## 2025-06-19 NOTE — PROGRESS NOTES
Occupational Therapy                 Therapy Communication Note    Patient Name: Florencia Wang  MRN: [id number]  Department: PAR CT  Room: [address] [place] [address]  Today's Date: 6/19/2025     Discipline: Occupational Therapy    Missed Visit: OT Missed Visit: Yes     Missed Visit Reason: Missed Visit Reason:  (Pt off floor for CT. Will re-attempt as able.)    Missed Time: Attempt

## 2025-06-19 NOTE — PROGRESS NOTES
Occupational Therapy    Evaluation    Patient Name: Florencia Wang  MRN: [id number]  Today's Date: 6/19/2025  Time Calculation  Start Time: 1453  Stop Time: 1502  Time Calculation (min): 9 min  922/922-A    Assessment  IP OT Assessment  OT Assessment: Pt presents with decreased strength and endurance which impact her ADL and functional transfer status. Pt would benefit from skilled OT to address deficits and maximize IND/safety.  Prognosis: Good  Barriers to Discharge Home: No anticipated barriers  Evaluation/Treatment Tolerance: Patient tolerated treatment well  End of Session Communication: Bedside nurse  End of Session Patient Position: Up in chair, Alarm off, not on at start of session (family present)    Plan:  Treatment Interventions: ADL retraining, Functional transfer training, UE strengthening/ROM, Endurance training, Patient/family training  OT Frequency: 2 times per week  OT Discharge Recommendations: Low intensity level of continued care  OT Recommended Transfer Status: Assist of 1  OT - OK to Discharge: Yes (ok to d/c to next level of care once medically cleared)    Subjective     Current Problem:  1. Generalized weakness        2. Esophageal dysmotility  Esophagogastroduodenoscopy (EGD)          General:  General  Reason for Referral: ADL impairment  Referred By: PT/OT 6/18 Adan  Past Medical History Relevant to Rehab: depression, COPD, HTN, HLD, OA, lupus, migraines, anxiety, bipolar disorder, TIA, sickle cell anemia  Family/Caregiver Present: Yes (family present and supportive)  Co-Treatment: PT  Co-Treatment Reason: to maximize pt function and safety  Prior to Session Communication: Bedside nurse (cleared for therapy)  Patient Position Received: Up in chair, Alarm off, not on at start of session  General Comment: Pt is a 70 y/o F who presented to ED 6/18 with c/o generalized weakness, dizziness, unintentional weight loss.    Precautions:  Medical Precautions: Fall precautions    Pain:  Pain  Assessment  Pain Assessment: 0-10  0-10 (Numeric) Pain Score:  (did not rate)  Pain Location: Generalized  Pain Interventions: Repositioned, Ambulation/increased activity    Objective     Cognition:  Overall Cognitive Status: Within Functional Limits  Orientation Level: Oriented X4             Home Living:  Home Living Comments: Pt reports living with her daughter and granddaughter in 1st floor apartment with no VALENTINE. Has a tub shower with seat and HH shower head. Raised toilet seat with grab bar.     Prior Function:  Prior Function Comments: PTA, pt reports ambulating MOD I using rollator, assist with ADLs/iADLs from family (completes toileting MOD I). Recently hired aide to assist with iADLs 28 hours a week, 4-5 hours per day.  Denies any falls in the past 6 months. Does not drive, family transports.      ADL:  Eating Assistance:  (S/U)  Grooming Assistance: Stand by  Bathing Assistance: Minimal  UE Dressing Assistance: Minimal  LE Dressing Assistance: Minimal  Toileting Assistance with Device: Minimal    Activity Tolerance:  Endurance: Decreased tolerance for upright activites    Bed Mobility/Transfers:   Bed Mobility  Bed Mobility: No  Transfers  Transfer: Yes (STS from chair using FWW with SBA.)    Ambulation/Gait Training:  Functional Mobility  Functional Mobility Performed: Yes (Pt completed functional mobility short household distances using FWW with CGA. Forward flexed posture throughout.)    Sitting Balance:  Static Sitting Balance  Static Sitting-Comment/Number of Minutes: Good  Dynamic Sitting Balance  Dynamic Sitting-Comments: Fair+    Standing Balance:  Static Standing Balance  Static Standing-Comment/Number of Minutes: Fair  Dynamic Standing Balance  Dynamic Standing-Comments: Fair    Strength:  Strength Comments: BUE strength grossly WFL    Hand Function:  Hand Function  Gross Grasp: Functional    Extremities: RUE   RUE : Within Functional Limits and LUE   LUE: Within Functional Limits    Outcome  Measures: [facility] Daily Activity  Putting on and taking off regular lower body clothing: A little  Bathing (including washing, rinsing, drying): A little  Putting on and taking off regular upper body clothing: A little  Toileting, which includes using toilet, bedpan or urinal: A little  Taking care of personal grooming such as brushing teeth: A little  Eating Meals: A little  Daily Activity - Total Score: 18                       EDUCATION:  Education  Individual(s) Educated: Patient  Education Provided: Fall precautons, Risk and benefits of OT discussed with patient or other, POC discussed and agreed upon  Patient Response to Education: Patient/Caregiver Verbalized Understanding of Information  Education Documentation  Body Mechanics, taught by Elizabeth Camara OT at 6/19/2025  3:23 PM.  Learner: Patient  Readiness: Acceptance  Method: Explanation, Demonstration  Response: Verbalizes Understanding, Demonstrated Understanding, Needs Reinforcement    Goals:   Encounter Problems       Encounter Problems (Active)       OT Goals       Pt will complete all functional transfers with MOD I. (Progressing)       Start:  06/19/25    Expected End:  07/03/25            Pt will complete functional mobility household distances with MOD I. (Progressing)       Start:  06/19/25    Expected End:  07/03/25            Pt will complete LB dressing/bathing with SBA. (Progressing)       Start:  06/19/25    Expected End:  07/03/25            Pt will complete all toileting tasks with MOD I. (Progressing)       Start:  06/19/25    Expected End:  07/03/25            Pt will demo IND with BUE strengthening HEP in order to increase endurance/strength required for ADLs. (Progressing)       Start:  06/19/25    Expected End:  07/03/25

## 2025-06-19 NOTE — CONSULTS
"Nutrition Initial Assessment:   Nutrition Assessment    Reason for Assessment: Admission nursing screening (MST=3 for weight loss and decreased PO intakes)    Patient is a 69 y.o. female presenting with generalized weakness    Pmhx: HTN, HLD, CAD, COPD, TIA, SLE, OA, arthritis, Hgb C trait, hypothyroidism, epilepsy, migraines, hx esophageal motility issues, hiatal hernia     Nutrition History:  Energy Intake: Poor < 50 %  Pain affecting nutrition status: N/A  Food and Nutrient History: Pt sitting in chair in room at visit. Pt reports decreasing appetite over the last year due to extraction of teeth, pt has partials but was also experiencing early satiety. Pt notes she would chew up food, \"suck the juice out\", then spit out the solid. Pt notes, liquids would \"go down\" fine, was able to tolerate soft fruits and soft/cooked vegetables, mashed potatoes, ice cream, no meat.  Pt reports she could \"feel the food sitting not wanting to go down\". Per review of chart, pt was eating only fruit in morning and PB sandwich in the evening.  P twith hx of esophageal motility issues s/p manometry 2023 which found fragmented peristalsis and poor bolus transit.  Pt feels like her current issue is the same as this in 2023. Pt notes she has a hiatal hernia. Pt complains of constipation, likely related to decreased PO intakes.Pt was not drinking protein shakes at home as they are too expensive. Pt reports she is to have an EGD today.  Vitamin/Herbal Supplement Use: none noted       Anthropometrics:  Height: 188 cm (6' 2.02\") (per pt)   Weight: 65.3 kg (143 lb 15.4 oz)   BMI (Calculated): 18.48  IBW/kg (Dietitian Calculated): 79.5 kg  Percent of IBW: 82 %        Weight History:     Weight Change %:  Weight History / % Weight Change: 5/27/25 68kg (3% loss x3 weeks), 3/13/25 69.9kg, 11/27/24 72.1kg, 10/23/24 73.9kg, 8/22/24 75.3kg   (clinically insignificant weight loss but notable trend down); Pt notes that 6 years ago she weighed close to " 300lbs. This is when she started to lose weight slowly over the next 6 years.  Significant Weight Loss: No    Nutrition Focused Physical Exam Findings:    Subcutaneous Fat Loss:   Orbital Fat Pads: Severe (dark circles, hollowing and loose skin)  Buccal Fat Pads: Severe (hollow, sunken and narrow face)  Triceps: Severe (negligible fat tissue)  Ribs: Defer  Muscle Wasting:  Temporalis: Severe (hollowed scooping depression)  Pectoralis (Clavicular Region): Severe (protruding prominent clavicle)  Deltoid/Trapezius: Severe (squared shoulders, acromion process prominent)  Interosseous: Defer  Trapezius/Infraspinatus/Supraspinatus (Scapular Region): Defer  Quadriceps: Severe (depressions on inner and outer thigh)  Gastrocnemius: Defer  Edema:  Edema: none  Physical Findings:  Skin: Negative  Respiratory : Negative  Digestive System Findings: Constipation  Mouth Findings: Chewing difficulty, Dysphagia  Teeth Findings: Impaired dentition    Nutrition Significant Labs:  CBC Trend:   Results from last 7 days   Lab Units 06/19/25  0501 06/18/25  1456   WBC AUTO x10*3/uL 3.3* 5.5   RBC AUTO x10*6/uL 4.30 4.46   HEMOGLOBIN g/dL 11.4* 12.2   HEMATOCRIT % 34.1* 34.9*   MCV fL 79* 78*   PLATELETS AUTO x10*3/uL 204 202    , BMP Trend:   Results from last 7 days   Lab Units 06/19/25  0501 06/18/25  1456   GLUCOSE mg/dL 145* 102*   CALCIUM mg/dL 9.6 9.6   SODIUM mmol/L 141 141   POTASSIUM mmol/L 4.7 3.9   CO2 mmol/L 29 29   CHLORIDE mmol/L 104 105   BUN mg/dL 13 16   CREATININE mg/dL 0.89 0.90        Nutrition Specific Medications:  Reviewed     I/O: pt reports last BM 6/18   ;      Dietary Orders (From admission, onward)       Start     Ordered    06/19/25 0001  NPO Diet Except: Sips with meds; Effective midnight  Diet effective midnight        Question:  Except:  Answer:  Sips with meds    06/18/25 2113 06/18/25 2148  May Participate in Room Service  ( ROOM SERVICE MAY PARTICIPATE)  Once        Question:  .  Answer:  Yes     06/18/25 2147                     Estimated Needs:      Method for Estimating Needs: 1960-2155kcals (30-33kcals/kg ABW)     Method for Estimating 24 Hour Protein Needs: 72-85 (1.1-1.3g/kg ABW)     Method for Estimating 24 Hour Fluid Needs: 1 mL/kcal or as per MD  Patient on Order Fluid Restriction: No        Nutrition Diagnosis   Malnutrition Diagnosis  Patient has Malnutrition Diagnosis: Yes  Diagnosis Status: New  Malnutrition Diagnosis: Severe malnutrition related to starvation  Related to: decreased appetite, inability to swallow  As Evidenced by: severe muscle wasting and severe subcutaneous fat loss, likely PO intakes meeting <=50% of EEN for >=1 month;  Additional Assessment Information: weight trending down over the past year, not clinically significant, BMI 18.4            Nutrition Interventions/Recommendations        Nutrition Recommendations:  Individualized Nutrition Prescription Provided for : Pt is NPO for testing; may need swallow evaluation depending on result of EGD    Nutrition Interventions/Goals:        Education Documentation  N/A at this time        Nutrition Monitoring and Evaluation   Additional Plans: monitor for result of EGD and ability to initiate least restrictive diet    Body Weight: Body weight - Maintain stable weight, Body weight - Promote weight restoration    Electrolyte and Renal Panel: Electrolytes within normal limits    Adipose Finding: Loss of subcutaneous fat  Digestive System Finding: Constipation  Criteria: formed BM at least every 2-3 days  Muscle Finding: Muscle atrophy  Criteria: tolerate LRD    Goal Status: New goal(s) identified    Time Spent (min): 60 minutes

## 2025-06-19 NOTE — CONSULTS
Inpatient consult to Neurology  Consult performed by: Johnson Steven MD  Consult ordered by: Nik Arellano, ALONDRA-CNP          History Of Present Illness  Florencia Wang is a 69 y.o. female retention, hyperlipidemia, coronary artery disease, COPD, TIA, arthritis, SLE, hemoglobin C trait, osteoarthritis, avascular necrosis of bilateral hips, hypothyroidism, epilepsy on Depakote, headaches on Nurtec as needed.  Patient presented with numerous complaints mainly including worsening weakness in the lower extremities, worsening frequency of headaches, dysphagia, changes in bowel patterns, and unintentional 10 pound weight loss over the last 3 months.  Patient reports she has been feeling off for a few weeks including being dizzy, weak, off balance, brain fog.  Denies any chest pain, shortness of breath, fevers, chills, sore throat, abdominal pain, dysuria, or other new/acute symptoms.  Patient's lab work in the ED was notable for normal CMP, and CBC with WBC 5.5 and hemoglobin 12.2.  Troponin was noted to be elevated at 47, then 52, then 67.  UA did not reveal any UTI.  CT head showed no acute intracranial process chest x-ray showed no acute cardiopulmonary process patient is currently admitted and being managed for overall weakness and to be seen by PT/OT.    The patient states that she has been weaning off her Depakote as she does not want to take a lot of medicines.  The patient states that she is having headaches daily.  The patient feels generally weak and has brain fog.    Past Medical History  Medical History[1]  Surgical History  Surgical History[2]  Social History  Social History[3]  Allergies  Cyclobenzaprine, Gabapentin, Iodinated contrast media, Sulfa (sulfonamide antibiotics), Sumatriptan, Adhesive tape-silicones, Sertraline, Latex, and Metoprolol  Prescriptions Prior to Admission[4]    Review of Systems   Neurological:  Positive for weakness. Tremors: .MEDS. Light-headedness: .MEDS.  All other systems reviewed  "and are negative.      Neurological Exam  Physical Exam  Last Recorded Vitals  Blood pressure 129/80, pulse 67, temperature 36.7 °C (98.1 °F), temperature source Temporal, resp. rate 16, height 1.88 m (6' 2.02\"), weight 65.3 kg (143 lb 15.4 oz), SpO2 97%.    The patient is a well developed, [thin] [female] in no acute distress.    The patient's funduscopic examination shows no papilledema bilaterally.    The patient's extremity examination shows that the pulses are 2+ in the upper and lower extremities bilaterally and there is no edema in the lower extremities bilaterally.    The patient's mental status testing is alert and oriented ×3 with no evidence of aphasia or dysarthria.  The patient's memory testing, fund of knowledge and concentration are all within normal limits.  The patient's cranial nerves 2, 3, 4, 5, 6, 7, 8, 9, 10, 11 and 12 are all within normal limits.  The patient's motor testing shows normal tone, bulk, and power in the upper and lower extremities bilaterally but the patient has giveaway weakness in the lower extremities proximally and bilaterally.  The patient's sensory testing is intact to light touch in the upper and lower extremities bilaterally.  The patient's cerebellar testing is intact in the upper and lower extremities bilaterally.  The patient's station and gait are it was crazy driving and there is water everywhere mildly unsteady  The patient's reflexes are 1+ in the upper and lower extremities and symmetrical.    Relevant Results      Scheduled medications  Scheduled Medications[5]  Continuous medications  Continuous Medications[6]  PRN medications  PRN Medications[7]    Results for orders placed or performed during the hospital encounter of 06/18/25 (from the past 96 hours)   CBC and Auto Differential   Result Value Ref Range    WBC 5.5 4.4 - 11.3 x10*3/uL    nRBC 0.0 0.0 - 0.0 /100 WBCs    RBC 4.46 4.00 - 5.20 x10*6/uL    Hemoglobin 12.2 12.0 - 16.0 g/dL    Hematocrit 34.9 (L) 36.0 " - 46.0 %    MCV 78 (L) 80 - 100 fL    MCH 27.4 26.0 - 34.0 pg    MCHC 35.0 32.0 - 36.0 g/dL    RDW 15.9 (H) 11.5 - 14.5 %    Platelets 202 150 - 450 x10*3/uL    Neutrophils % 47.0 40.0 - 80.0 %    Immature Granulocytes %, Automated 0.4 0.0 - 0.9 %    Lymphocytes % 42.8 13.0 - 44.0 %    Monocytes % 8.7 2.0 - 10.0 %    Eosinophils % 0.9 0.0 - 6.0 %    Basophils % 0.2 0.0 - 2.0 %    Neutrophils Absolute 2.58 1.60 - 5.50 x10*3/uL    Immature Granulocytes Absolute, Automated 0.02 0.00 - 0.50 x10*3/uL    Lymphocytes Absolute 2.35 0.80 - 3.00 x10*3/uL    Monocytes Absolute 0.48 0.05 - 0.80 x10*3/uL    Eosinophils Absolute 0.05 0.00 - 0.40 x10*3/uL    Basophils Absolute 0.01 0.00 - 0.10 x10*3/uL   Comprehensive Metabolic Panel   Result Value Ref Range    Glucose 102 (H) 74 - 99 mg/dL    Sodium 141 136 - 145 mmol/L    Potassium 3.9 3.5 - 5.3 mmol/L    Chloride 105 98 - 107 mmol/L    Bicarbonate 29 21 - 32 mmol/L    Anion Gap 11 10 - 20 mmol/L    Urea Nitrogen 16 6 - 23 mg/dL    Creatinine 0.90 0.50 - 1.05 mg/dL    eGFR 68 >60 mL/min/1.73m*2    Calcium 9.6 8.6 - 10.3 mg/dL    Albumin 4.0 3.4 - 5.0 g/dL    Alkaline Phosphatase 73 33 - 136 U/L    Total Protein 7.9 6.4 - 8.2 g/dL    AST 19 9 - 39 U/L    Bilirubin, Total 0.6 0.0 - 1.2 mg/dL    ALT 9 7 - 45 U/L   Troponin I, High Sensitivity   Result Value Ref Range    Troponin I, High Sensitivity 47 (H) 0 - 13 ng/L   Influenza A, and B PCR   Result Value Ref Range    Flu A Result Not Detected Not Detected    Flu B Result Not Detected Not Detected   Sars-CoV-2 PCR   Result Value Ref Range    Coronavirus 2019, PCR Not Detected Not Detected   TSH with reflex to Free T4 if abnormal   Result Value Ref Range    Thyroid Stimulating Hormone 21.72 (H) 0.44 - 3.98 mIU/L   Thyroxine, Free   Result Value Ref Range    Thyroxine, Free 0.64 0.61 - 1.12 ng/dL   Magnesium   Result Value Ref Range    Magnesium 2.13 1.60 - 2.40 mg/dL   Reticulocytes   Result Value Ref Range    Retic % 0.8 0.5 - 2.0  %    Retic Absolute 0.036 0.017 - 0.110 x10*6/uL    Reticulocyte Hemoglobin 27 (L) 28 - 38 pg    Immature Retic fraction 7.8 <=16.0 %   Sedimentation rate, automated   Result Value Ref Range    Sedimentation Rate 34 (H) 0 - 30 mm/h   ECG 12 Lead   Result Value Ref Range    Ventricular Rate 79 BPM    Atrial Rate 79 BPM    ND Interval 190 ms    QRS Duration 93 ms    QT Interval 344 ms    QTC Calculation(Bazett) 395 ms    P Axis 69 degrees    R Axis 264 degrees    T Axis 47 degrees    QRS Count 13 beats    Q Onset 249 ms    T Offset 421 ms    QTC Fredericia 377 ms   Urinalysis with Reflex Microscopic   Result Value Ref Range    Color, Urine Yellow Light-Yellow, Yellow, Dark-Yellow    Appearance, Urine Clear Clear    Specific Gravity, Urine 1.021 1.005 - 1.035    pH, Urine 6.0 5.0, 5.5, 6.0, 6.5, 7.0, 7.5, 8.0    Protein, Urine 20 (TRACE) NEGATIVE, 10 (TRACE), 20 (TRACE) mg/dL    Glucose, Urine Normal Normal mg/dL    Blood, Urine NEGATIVE NEGATIVE mg/dL    Ketones, Urine NEGATIVE NEGATIVE mg/dL    Bilirubin, Urine NEGATIVE NEGATIVE mg/dL    Urobilinogen, Urine 2 (1+) (A) Normal mg/dL    Nitrite, Urine NEGATIVE NEGATIVE    Leukocyte Esterase, Urine NEGATIVE NEGATIVE   Microscopic Only, Urine   Result Value Ref Range    WBC, Urine 1-5 1-5, NONE /HPF    RBC, Urine 1-2 NONE, 1-2, 3-5 /HPF    Squamous Epithelial Cells, Urine 1-9 (SPARSE) Reference range not established. /HPF    Mucus, Urine 1+ Reference range not established. /LPF    Hyaline Casts, Urine 3+ (A) NONE /LPF    Amorphous Crystals, Urine 1+ NONE, 1+, 2+ /HPF   Troponin I, High Sensitivity   Result Value Ref Range    Troponin I, High Sensitivity 52 (HH) 0 - 13 ng/L   C-reactive protein   Result Value Ref Range    C-Reactive Protein 1.25 (H) <1.00 mg/dL   Troponin I, High Sensitivity   Result Value Ref Range    Troponin I, High Sensitivity 67 (HH) 0 - 13 ng/L   CBC   Result Value Ref Range    WBC 3.3 (L) 4.4 - 11.3 x10*3/uL    nRBC 0.0 0.0 - 0.0 /100 WBCs    RBC  4.30 4.00 - 5.20 x10*6/uL    Hemoglobin 11.4 (L) 12.0 - 16.0 g/dL    Hematocrit 34.1 (L) 36.0 - 46.0 %    MCV 79 (L) 80 - 100 fL    MCH 26.5 26.0 - 34.0 pg    MCHC 33.4 32.0 - 36.0 g/dL    RDW 15.9 (H) 11.5 - 14.5 %    Platelets 204 150 - 450 x10*3/uL   Basic metabolic panel   Result Value Ref Range    Glucose 145 (H) 74 - 99 mg/dL    Sodium 141 136 - 145 mmol/L    Potassium 4.7 3.5 - 5.3 mmol/L    Chloride 104 98 - 107 mmol/L    Bicarbonate 29 21 - 32 mmol/L    Anion Gap 13 10 - 20 mmol/L    Urea Nitrogen 13 6 - 23 mg/dL    Creatinine 0.89 0.50 - 1.05 mg/dL    eGFR 69 >60 mL/min/1.73m*2    Calcium 9.6 8.6 - 10.3 mg/dL   Troponin I, High Sensitivity   Result Value Ref Range    Troponin I, High Sensitivity 28 (H) 0 - 13 ng/L     *Note: Due to a large number of results and/or encounters for the requested time period, some results have not been displayed. A complete set of results can be found in Results Review.          I have personally reviewed the following imaging results:   Imaging  CT head wo IV contrast  Result Date: 6/18/2025  No without acute intracranial process.   Signed by: William Shane 6/18/2025 3:36 PM Dictation workstation:   [id number]    XR chest 1 view  Result Date: 6/18/2025  No radiographic evidence of acute cardiopulmonary abnormality.     MACRO: None   Signed by: Ta Avila 6/18/2025 3:12 PM Dictation workstation:   [id number]      Cardiology, Vascular, and Other Imaging  ECG 12 Lead  Result Date: 6/19/2025  Sinus rhythm Atrial premature complex Left anterior fascicular block Right ventricular hypertrophy ST elevation, consider inferior injury         Assessment/Plan   Assessment & Plan  Generalized weakness    History of migraine    Dysphagia, unspecified    Esophageal dysmotility        Impression: The patient is a 69-year-old female with multiple medical problems who presents with headache, weight loss and difficulties with swallowing.  Her neurological examination is mildly abnormal  and noted above.  The differential diagnosis for her swallowing difficulties includes cerebral infarction which I doubt, esophageal cancer, ALS, eosinophilic esophagitis, gastric cancer, pancreatic cancer, scleroderma and myasthenia.    Plan: The patient needs a workup for myasthenia.  GI has been consulted and she will need an upper endoscopy.  Speech therapy has also been consulted.  If the workup is negative, I would certainly consider an ENT consult.  We can also consider an MRI of the brain but the CT was negative for an acute stroke.  The patient does need prophylactic therapy for her headaches but does not wish to be on that many medicines.  When she is discharged, I will start her on Emgality and give her 240 mg subcu loading dose the first month and 120 mg subcu dose per month.  I did warn her of the possibility of site reactions with this medicine.  The patient needs to continue stroke risk factor modification.   The patient needs a PT, OT, social service, and rehab.  The patient needs DVT prophylaxis.  The patient needs neurochecks as per protocol.  I will send the note to Dr. Reyna.   Thank you very much for sending me this very interesting consultation.  I discussed all these issues in detail with the patient and her family and answered all their questions.  I will continue to follow the patient while they are in the hospital.  The patient needs follow-up with their primary care doctor within 2 weeks of discharge.  On discharge, the patient will follow up with me in the office in 4 months.        Johnson Steven MD         [1]   Past Medical History:  Diagnosis Date    Abnormal weight gain 02/05/2016    Weight gain    Anxiety     Bipolar disorder, unspecified (Multi) 02/05/2016    Bipolar disorder    Migraines     Old myocardial infarction 11/19/2019    History of myocardial infarction    Other fatigue 02/05/2016    Tired    Personal history of (corrected) congenital malformations of heart and circulatory  system     History of congenital anomaly of heart    Personal history of other diseases of the circulatory system 2016    History of congestive heart failure    Personal history of other diseases of the nervous system and sense organs 2019    History of migraine    Personal history of other diseases of the respiratory system 2019    History of chronic obstructive lung disease    Personal history of other diseases of the respiratory system 2016    History of bronchitis    Personal history of other diseases of the respiratory system 2016    History of asthma    Personal history of other endocrine, nutritional and metabolic disease 10/30/2015    History of hypercholesterolemia    Personal history of other malignant neoplasm of skin 2016    History of malignant neoplasm of skin    Personal history of other mental and behavioral disorders 2016    History of depression    Personal history of pneumonia (recurrent) 2016    History of pneumonia    Personal history of transient ischemic attack (TIA), and cerebral infarction without residual deficits 2016    History of stroke    Personal history of transient ischemic attack (TIA), and cerebral infarction without residual deficits 2016    History of transient cerebral ischemia   [2]   Past Surgical History:  Procedure Laterality Date    BREAST SURGERY  10/30/2015    Breast Surgery     SECTION, CLASSIC  10/30/2015     Section    FOOT SURGERY  10/30/2015    Foot Surgery    HYSTERECTOMY  10/30/2015    Hysterectomy    MR HEAD ANGIO WO IV CONTRAST  9/3/2015    MR HEAD ANGIO WO IV CONTRAST 9/3/2015 [facility]CILLARY LEGACY    MR HEAD ANGIO WO IV CONTRAST  2019    MR HEAD ANGIO WO IV CONTRAST 2019 [facility] CLINICAL LEGACY    MR HEAD ANGIO WO IV CONTRAST  2019    MR HEAD ANGIO WO IV CONTRAST 2019 [facility]    MR HEAD ANGIO WO IV CONTRAST  2021    MR HEAD ANGIO WO IV CONTRAST 2021  [facility] CLINICAL LEGACY    MR HEAD ANGIO WO IV CONTRAST  9/18/2021    MR HEAD ANGIO WO IV CONTRAST 9/18/2021 [facility]    MR NECK ANGIO WO IV CONTRAST  4/29/2019    MR NECK ANGIO WO IV CONTRAST 4/29/2019 [facility] CLINICAL LEGACY    MR NECK ANGIO WO IV CONTRAST  2/2/2021    MR NECK ANGIO WO IV CONTRAST 2/2/2021 [facility] CLINICAL LEGACY    OTHER SURGICAL HISTORY  07/13/2022    Cardiac catheterization    OTHER SURGICAL HISTORY  03/11/2019    Excision melanoma    US ASPIRATION INJECTION INTERMEDIATE JOINT  9/3/2020    US ASPIRATION INJECTION INTERMEDIATE JOINT 9/3/2020 [facility]CILLARY [facility]GAC[facility]    US ASPIRATION INJECTION INTERMEDIATE JOINT  9/21/2020    US ASPIRATION INJECTION INTERMEDIATE JOINT 9/21/2020 [facility]    US ASPIRATION INJECTION INTERMEDIATE JOINT  1/8/2021    US ASPIRATION INJECTION INTERMEDIATE JOINT 1/8/2021 [facility]    US ASPIRATION INJECTION INTERMEDIATE JOINT  5/28/2021    US ASPIRATION INJECTION INTERMEDIATE JOINT 5/28/2021 [facility]    US ASPIRATION INJECTION INTERMEDIATE JOINT  5/28/2021    US ASPIRATION INJECTION INTERMEDIATE JOINT 5/28/2021 [facility]   [3]   Social History  Tobacco Use    Smoking status: Former     Types: Cigarettes     Start date: 1972     Passive exposure: Past    Smokeless tobacco: Never   Vaping Use    Vaping status: Never Used   Substance Use Topics    Alcohol use: Not Currently    Drug use: Yes     Types: Marijuana   [4]   Medications Prior to Admission   Medication Sig Dispense Refill Last Dose/Taking    albuterol 90 mcg/actuation inhaler INHALE 2 PUFFS EVERY 4 HOURS IF NEEDED FOR SHORTNESS OF BREATH OR WHEEZING. 8.5 g 3 Past Week    ascorbic acid (Vitamin C) 500 mg tablet Take 1 tablet (500 mg) by mouth once daily.   Past Week    aspirin 81 mg chewable tablet CHEW 1 TABLET (81 MG) ONCE DAILY. (Patient taking differently: Chew 1 tablet (81 mg) once a day on Sunday, Tuesday, Thursday, and Saturday.) 90 tablet 2 Past Week     atorvastatin (Lipitor) 20 mg tablet Take 1 tablet (20 mg) by mouth once daily. 90 tablet 3 Past Week    BLACK SEED ORAL Take 1 capsule by mouth once daily.   Past Week    DANDELION ROOT ORAL Take 1 capsule by mouth once daily.   Past Week    DULoxetine (Cymbalta) 60 mg DR capsule TAKE 1 CAPSULE (60 MG) BY MOUTH ONCE DAILY. 30 capsule 2 Past Week    esomeprazole (NexIUM) 40 mg DR capsule TAKE 1 CAPSULE BY MOUTH EVERY DAY (Patient taking differently: Take 1 capsule (40 mg) by mouth once daily in the morning. Take before meals.) 30 capsule 0 Past Week    fluticasone (Flonase) 50 mcg/actuation nasal spray Administer 1 spray into each nostril once daily. Shake gently. Before first use, prime pump. After use, clean tip and replace cap. (Patient taking differently: Administer 1 spray into each nostril once daily as needed for rhinitis. Shake gently. Before first use, prime pump. After use, clean tip and replace cap.) 16 g 5 Past Week    hydrocortisone 2.5 % cream Apply topically 2 times a day as needed for irritation or rash. 30 g 5 Unknown    hydroxychloroquine (Plaquenil) 200 mg tablet TAKE 1 TABLET (200 MG) BY MOUTH ONCE DAILY. 30 tablet 2 Past Week    levothyroxine (Synthroid, Levoxyl) 100 mcg tablet Take 1 tablet (100 mcg) by mouth early in the morning.. Take on an empty stomach at the same time each day, either 30 to 60 minutes prior to breakfast   Past Week    nitroglycerin (Nitrostat) 0.4 mg SL tablet Place 1 tablet (0.4 mg) under the tongue every 5 minutes if needed for chest pain.   Unknown    Nurtec ODT 75 mg tablet,disintegrating TAKE 1 TABLET (75 MG) BY MOUTH EVERY OTHER DAY. (Patient taking differently: Dissolve 1 tablet (75 mg) in the mouth every other day if needed (migraine onset).) 10 tablet 1 Unknown    oregano oiL 1,500 mg capsule Take 1 capsule by mouth once daily.   Past Week    potassium chloride CR 20 mEq ER tablet TAKE 1 TABLET (20 MEQ) BY MOUTH ONCE DAILY. 30 tablet 2 Past Week    predniSONE  (Deltasone) 10 mg tablet TAKE 1 TABLET (10 MG) BY MOUTH EVERY OTHER DAY. (Patient taking differently: Take 1 tablet (10 mg) by mouth once daily as needed (inflammation).) 15 tablet 2 Unknown    tiZANidine (Zanaflex) 4 mg tablet TAKE 1 TABLET (4 MG) BY MOUTH 3 TIMES A DAY AS NEEDED FOR MUSCLE SPASMS. 90 tablet 2 Past Week    zolpidem (Ambien) 10 mg tablet Take 1 tablet (10 mg) by mouth once daily at bedtime. (Patient taking differently: Take 1 tablet (10 mg) by mouth as needed at bedtime for sleep.) 30 tablet 5 Past Week    albuterol 2.5 mg /3 mL (0.083 %) nebulizer solution Inhale 3 mL every 6 hours if needed for wheezing or shortness of breath. (Patient not taking: Reported on 6/19/2025)   Not Taking    divalproex (Depakote ER) 250 mg 24 hr tablet Take 1 tablet (250 mg) by mouth 3 times a day. Do not crush, chew, or split. (Patient not taking: Reported on 6/19/2025)   Not Taking    DULoxetine (Cymbalta) 30 mg DR capsule TAKE 1 CAPSULE (30 MG) BY MOUTH ONCE DAILY. DO NOT CRUSH OR CHEW. (Patient not taking: Reported on 6/19/2025) 30 capsule 11 Not Taking    ergocalciferol (Vitamin D-2) 1.25 MG (28000 UT) capsule Take 1 capsule (1.25 mg) by mouth 1 (one) time per week. (Patient not taking: Reported on 6/19/2025)   Not Taking    esomeprazole (NexIUM) 40 mg DR capsule TAKE 1 CAPSULE BY MOUTH EVERY DAY (Patient not taking: Reported on 6/19/2025) 30 capsule 0 Not Taking    esomeprazole (NexIUM) 40 mg DR capsule TAKE 1 CAPSULE BY MOUTH EVERY DAY 30 capsule 0     furosemide (Lasix) 20 mg tablet Take 1 tablet (20 mg) by mouth 3 times a week. (Patient taking differently: Take 1 tablet (20 mg) by mouth once daily as needed (edema).)   Unknown    guaiFENesin (Mucinex) 600 mg 12 hr tablet Take 1 tablet (600 mg) by mouth every 12 hours if needed for cough or congestion. (Patient not taking: Reported on 6/19/2025)   Not Taking    HYDROcodone-acetaminophen (Norco) 5-325 mg tablet Take 1 tablet by mouth 3 times a day as needed for  severe pain (7 - 10). (Patient not taking: Reported on 6/19/2025)   Not Taking    levothyroxine (Synthroid, Levoxyl) 137 mcg tablet TAKE 1 TABLET BY MOUTH EVERY DAY (Patient not taking: Reported on 6/19/2025) 30 tablet 2 Not Taking    metoprolol succinate XL (Toprol-XL) 25 mg 24 hr tablet Take 1 tablet (25 mg) by mouth once daily. (Patient not taking: Reported on 6/19/2025)   Not Taking    potassium chloride CR 20 mEq ER tablet TAKE 1 TABLET (20 MEQ) BY MOUTH ONCE DAILY. (Patient not taking: Reported on 6/19/2025) 30 tablet 1 Not Taking    tiZANidine (Zanaflex) 4 mg tablet TAKE 1 TABLET (4 MG) BY MOUTH 3 TIMES A DAY AS NEEDED FOR MUSCLE SPASMS. (Patient not taking: Reported on 6/19/2025) 90 tablet 1 Not Taking    tiZANidine (Zanaflex) 4 mg tablet Take 2 tablets (8 mg) by mouth every 8 hours if needed for muscle spasms. (Patient not taking: Reported on 6/18/2025) 90 tablet 0    [5] ALPRAZolam, 0.5 mg, oral, Once  aspirin, 81 mg, oral, Every Sun/Tues/Thur/Sat  atorvastatin, 20 mg, oral, Daily  DULoxetine, 60 mg, oral, Daily  enoxaparin, 40 mg, subcutaneous, q24h  hydroxychloroquine, 200 mg, oral, Daily  levothyroxine, 100 mcg, oral, Daily  pantoprazole, 40 mg, intravenous, Daily  polyethylene glycol, 17 g, oral, Daily  potassium chloride CR, 20 mEq, oral, Daily  [START ON 6/20/2025] predniSONE, 10 mg, oral, Every other day  [6] D5 % and 0.9 % sodium chloride, 75 mL/hr, Last Rate: 75 mL/hr (06/19/25 1519)  [7] PRN medications: acetaminophen, fluticasone, ipratropium-albuteroL, nitroglycerin, ondansetron, oxyCODONE-acetaminophen, oxygen, zolpidem

## 2025-06-19 NOTE — CARE PLAN
This JOHNNA house officer was contacted by pharmacist, Elizabeth Wilson inquiring if I am able to order the patient's home medications. Will hold Zanaflex as pt is here with generalized weakness. Pt reportedly taking PRN Lasix 20mg for edema, which is not ordered as pt is not here with volume overload or edema. CXR with no effusions or edema. Nurtec we do not carry on formulary. Pt is reporting she is taking Depakote 500mg BID. Pharmacist technician Jeniffer Briseno reports pt has no fill history on Depakote since March 2024. Jeniffer reached out to the patient's pharmacy “[facility]” to verify this. Her last fill hx was March 2024 for 90 days. I reached out to the attending physician, Dr. Reyna, who requests to discontinue Depakote. I ordered a free Depakote level on her. Lastly, pt is prescribed Prednisone 10mg every other day. The pt reports taking the prednisone PRN for inflammation. Dr. Reyna confirms he would like her to take Prednisone 10mg every other day as prescribed.

## 2025-06-19 NOTE — PROGRESS NOTES
06/19/25 1524   Discharge Planning   Living Arrangements Children   Support Systems Children   Type of Residence Private residence   Do you have animals or pets at home? No   Who is requesting discharge planning? Provider   Expected Discharge Disposition Home   Stroke Family Assessment   Stroke Family Assessment Needed No   Intensity of Service   Intensity of Service 0-30 min     Met with pt and  her family.  Pt admit for weakness, wt loss.  Pt is receiving aide services 28 hrs a week from [facility].  Pt has been at [facility] and [facility] in the past, await therapy---family may want skilled care vs  more hours with [facility].  Pt is Rolator at baseline but has been getting more deconditioned per family.  Team to follow.  Preeti Rodriguez RN [facility]

## 2025-06-19 NOTE — CONSULTS
Cardiology Consult Note    Inpatient consult to Cardiology  Consult performed by: Jyothi Yost, ALONDRA-CNP  Consult ordered by: DO Florencia Keller Felipe is a 69 y.o. female previously known to Dr. Ramicone and Ulatowski with a past medical history significant for hypothyroidism, HTN, HLD, mild nonobstructive coronary artery disease (30% mid LAD stenosis in 2022), COPD (on Home O2 as needed), TIA, Hemoglobin C Trait, sickle cell anemia, SLE, epilepsy, and migraines. Patient presented on 6/18/2025 with numerous complaints including lower ext weakness, headache, dysphagia, change in bowel pattern, and unintentional weight loss. Cardiology consulted for Cardiac Clearance for EGD.    Subjective   Today, patient seen and assessed resting comfortably in her chair. Her main complaint is fatigue and difficultly swallowing. She reports chronic chest tightness and sharp chest pain that occurs in the morning and at night at rest. She occasionally feels chest tightness when walking in her home. Massaging her ribs and deep breaths relieve her pain. Does not use NTG due to chronic migraines. She also notes chronic stable shortness of breath that she relates to her COPD/asthma. She notes chronic mild lower extremity swelling. Has not recently had to use her PRN lasix and does not currently have edema upon exam.        ROS:  Review of Systems   Constitutional: Positive for malaise/fatigue.   Cardiovascular:  Positive for dyspnea on exertion (chronic). Negative for chest pain, leg swelling, near-syncope, orthopnea, palpitations and paroxysmal nocturnal dyspnea.        Chest tightness     Respiratory:  Positive for shortness of breath (chronic).         Past Medical History:  Medical History[1]    Problem List Items Addressed This Visit          Symptoms and Signs    * (Principal) Generalized weakness - Primary       Family History:  No relevant family history has been documented for this patient.    Medications:  Prior  to Admission medications    Medication Sig Start Date End Date Taking? Authorizing Provider   albuterol 2.5 mg /3 mL (0.083 %) nebulizer solution Inhale 3 mL every 6 hours if needed for wheezing or shortness of breath.    Historical Provider, MD   albuterol 90 mcg/actuation inhaler INHALE 2 PUFFS EVERY 4 HOURS IF NEEDED FOR SHORTNESS OF BREATH OR WHEEZING. 6/17/25   Toribio Ice, DO   ascorbic acid (Vitamin C) 500 mg tablet Take 1 tablet (500 mg) by mouth once daily.    Historical Provider, MD   aspirin 81 mg chewable tablet CHEW 1 TABLET (81 MG) ONCE DAILY. 5/1/25   Toribio Ice, DO   atorvastatin (Lipitor) 20 mg tablet Take 1 tablet (20 mg) by mouth once daily. 4/22/25 4/22/26  Toribio Ice, DO   DANDELION ROOT ORAL Take by mouth.    Historical Provider, MD   divalproex (Depakote ER) 250 mg 24 hr tablet Take 1 tablet (250 mg) by mouth 3 times a day. Do not crush, chew, or split.    Historical Provider, MD   DULoxetine (Cymbalta) 30 mg DR capsule TAKE 1 CAPSULE (30 MG) BY MOUTH ONCE DAILY. DO NOT CRUSH OR CHEW. 12/3/24 12/3/25  Nestor Banda MD   DULoxetine (Cymbalta) 60 mg DR capsule TAKE 1 CAPSULE (60 MG) BY MOUTH ONCE DAILY. 5/22/25 11/18/25  Toribio Ice, DO   ergocalciferol (Vitamin D-2) 1.25 MG (18326 UT) capsule Take 1 capsule (1.25 mg) by mouth 1 (one) time per week.    Historical Provider, MD   esomeprazole (NexIUM) 40 mg DR capsule TAKE 1 CAPSULE BY MOUTH EVERY DAY 7/16/24 Jason Ice, DO   esomeprazole (NexIUM) 40 mg DR capsule TAKE 1 CAPSULE BY MOUTH EVERY DAY 9/23/24 Jason Ice, DO   esomeprazole (NexIUM) 40 mg DR capsule TAKE 1 CAPSULE BY MOUTH EVERY DAY 9/23/24 Jason Ice, DO   fluticasone (Flonase) 50 mcg/actuation nasal spray Administer 1 spray into each nostril once daily. Shake gently. Before first use, prime pump. After use, clean tip and replace cap. 5/27/25 5/27/26  Toribio Reyna,    furosemide (Lasix) 20 mg tablet Take 1 tablet (20 mg) by mouth 3 times a week.    Historical Provider, MD Martini  (Mucinex) 600 mg 12 hr tablet Take 1 tablet (600 mg) by mouth every 12 hours if needed for cough or congestion.    Historical Provider, MD   HYDROcodone-acetaminophen (Norco) 5-325 mg tablet Take 1 tablet by mouth 3 times a day as needed for severe pain (7 - 10).    Historical Provider, MD   hydrocortisone 2.5 % cream Apply topically 2 times a day as needed for irritation or rash. 5/27/25 5/27/26  Toribio Ice, DO   hydroxychloroquine (Plaquenil) 200 mg tablet TAKE 1 TABLET (200 MG) BY MOUTH ONCE DAILY. 5/1/25   Toribio Ice, DO   levothyroxine (Synthroid, Levoxyl) 100 mcg tablet Take 1 tablet (100 mcg) by mouth early in the morning.. Take on an empty stomach at the same time each day, either 30 to 60 minutes prior to breakfast    Historical Provider, MD   levothyroxine (Synthroid, Levoxyl) 137 mcg tablet TAKE 1 TABLET BY MOUTH EVERY DAY  Patient taking differently: Take 1 tablet (137 mcg) by mouth once daily. Currently do not take 9/23/24   Toribio Ice, DO   metoprolol succinate XL (Toprol-XL) 25 mg 24 hr tablet Take 1 tablet (25 mg) by mouth once daily.    Historical Provider, MD   nitroglycerin (Nitrostat) 0.4 mg SL tablet AS DIRECTED SUBLINGUAL ONCE A DAY 30 DAYS    Historical Provider, MD Hern[employer]deztec ODT 75 mg tablet,disintegrating TAKE 1 TABLET (75 MG) BY MOUTH EVERY OTHER DAY.  Patient taking differently: Dissolve 1 tablet (75 mg) in the mouth every other day. Not taking 4/4/25   Marco A Paulson MD   potassium chloride CR 20 mEq ER tablet TAKE 1 TABLET (20 MEQ) BY MOUTH ONCE DAILY.  Patient not taking: Reported on 6/18/2025 2/12/24   Toribio Ice, DO   potassium chloride CR 20 mEq ER tablet TAKE 1 TABLET (20 MEQ) BY MOUTH ONCE DAILY. 5/22/25   Toribio Ice, DO   predniSONE (Deltasone) 10 mg tablet TAKE 1 TABLET (10 MG) BY MOUTH EVERY OTHER DAY. 4/22/25   Toribio Ice, DO   tiZANidine (Zanaflex) 4 mg tablet TAKE 1 TABLET (4 MG) BY MOUTH 3 TIMES A DAY AS NEEDED FOR MUSCLE SPASMS.  Patient not taking: Reported on 6/18/2025  9/23/24   Nestor Banda MD   tiZANidine (Zanaflex) 4 mg tablet Take 2 tablets (8 mg) by mouth every 8 hours if needed for muscle spasms.  Patient not taking: Reported on 6/18/2025 3/13/25   Toribio Reyna, DO   tiZANidine (Zanaflex) 4 mg tablet TAKE 1 TABLET (4 MG) BY MOUTH 3 TIMES A DAY AS NEEDED FOR MUSCLE SPASMS. 5/21/25   Nestor Banda MD   zolpidem (Ambien) 10 mg tablet Take 1 tablet (10 mg) by mouth once daily at bedtime. 5/27/25   Toribio Reyna, DO   albuterol 90 mcg/actuation inhaler INHALE 2 PUFFS EVERY 4 HOURS IF NEEDED FOR SHORTNESS OF BREATH OR WHEEZING. 2/25/25 6/17/25  Toribio Reyna, DO     Current Medications[2]    Allergies:  Allergies[3]    Social History:  Social History     Tobacco Use    Smoking status: Former     Types: Cigarettes     Start date: 1972     Passive exposure: Past    Smokeless tobacco: Never   Substance Use Topics    Alcohol use: Not Currently       Physical Exam:    Physical Exam  Constitutional:       Appearance: Normal appearance. She is normal weight.   HENT:      Head: Normocephalic and atraumatic.   Cardiovascular:      Rate and Rhythm: Normal rate and regular rhythm.      Pulses: Normal pulses.           Posterior tibial pulses are 2+ on the right side and 2+ on the left side.      Heart sounds: Normal heart sounds, S1 normal and S2 normal. No murmur heard.  Pulmonary:      Effort: Pulmonary effort is normal.      Breath sounds: Normal breath sounds.   Musculoskeletal:      Cervical back: Normal range of motion and neck supple.      Right lower leg: No edema.      Left lower leg: No edema.   Neurological:      General: No focal deficit present.      Mental Status: She is alert and oriented to person, place, and time.   Psychiatric:         Mood and Affect: Mood normal.          Last Recorded Vitals    Visit Vitals  /80   Pulse 67   Temp 36.7 °C (98.1 °F)   Resp 16        No intake or output data in the 24 hours ending 06/19/25 1150     /80   Pulse 67   Temp 36.7 °C (98.1  °F)   Resp 16   Wt 65.3 kg (144 lb)   SpO2 97%   Intake/Output last 3 Shifts:  No intake or output data in the 24 hours ending 06/19/25 1150    Admission Weight  Weight: 65.3 kg (144 lb) (06/18/25 1327)    Daily Weight  06/18/25 : 65.3 kg (144 lb)        Image Results  ECG 12 Lead  Sinus rhythm  Atrial premature complex  Left anterior fascicular block  Right ventricular hypertrophy  ST elevation, consider inferior injury        Relevant Results:  Results for orders placed or performed during the hospital encounter of 06/18/25 (from the past 24 hours)   CBC and Auto Differential   Result Value Ref Range    WBC 5.5 4.4 - 11.3 x10*3/uL    nRBC 0.0 0.0 - 0.0 /100 WBCs    RBC 4.46 4.00 - 5.20 x10*6/uL    Hemoglobin 12.2 12.0 - 16.0 g/dL    Hematocrit 34.9 (L) 36.0 - 46.0 %    MCV 78 (L) 80 - 100 fL    MCH 27.4 26.0 - 34.0 pg    MCHC 35.0 32.0 - 36.0 g/dL    RDW 15.9 (H) 11.5 - 14.5 %    Platelets 202 150 - 450 x10*3/uL    Neutrophils % 47.0 40.0 - 80.0 %    Immature Granulocytes %, Automated 0.4 0.0 - 0.9 %    Lymphocytes % 42.8 13.0 - 44.0 %    Monocytes % 8.7 2.0 - 10.0 %    Eosinophils % 0.9 0.0 - 6.0 %    Basophils % 0.2 0.0 - 2.0 %    Neutrophils Absolute 2.58 1.60 - 5.50 x10*3/uL    Immature Granulocytes Absolute, Automated 0.02 0.00 - 0.50 x10*3/uL    Lymphocytes Absolute 2.35 0.80 - 3.00 x10*3/uL    Monocytes Absolute 0.48 0.05 - 0.80 x10*3/uL    Eosinophils Absolute 0.05 0.00 - 0.40 x10*3/uL    Basophils Absolute 0.01 0.00 - 0.10 x10*3/uL   Comprehensive Metabolic Panel   Result Value Ref Range    Glucose 102 (H) 74 - 99 mg/dL    Sodium 141 136 - 145 mmol/L    Potassium 3.9 3.5 - 5.3 mmol/L    Chloride 105 98 - 107 mmol/L    Bicarbonate 29 21 - 32 mmol/L    Anion Gap 11 10 - 20 mmol/L    Urea Nitrogen 16 6 - 23 mg/dL    Creatinine 0.90 0.50 - 1.05 mg/dL    eGFR 68 >60 mL/min/1.73m*2    Calcium 9.6 8.6 - 10.3 mg/dL    Albumin 4.0 3.4 - 5.0 g/dL    Alkaline Phosphatase 73 33 - 136 U/L    Total Protein 7.9 6.4 -  8.2 g/dL    AST 19 9 - 39 U/L    Bilirubin, Total 0.6 0.0 - 1.2 mg/dL    ALT 9 7 - 45 U/L   Troponin I, High Sensitivity   Result Value Ref Range    Troponin I, High Sensitivity 47 (H) 0 - 13 ng/L   Influenza A, and B PCR   Result Value Ref Range    Flu A Result Not Detected Not Detected    Flu B Result Not Detected Not Detected   Sars-CoV-2 PCR   Result Value Ref Range    Coronavirus 2019, PCR Not Detected Not Detected   TSH with reflex to Free T4 if abnormal   Result Value Ref Range    Thyroid Stimulating Hormone 21.72 (H) 0.44 - 3.98 mIU/L   Thyroxine, Free   Result Value Ref Range    Thyroxine, Free 0.64 0.61 - 1.12 ng/dL   Magnesium   Result Value Ref Range    Magnesium 2.13 1.60 - 2.40 mg/dL   Reticulocytes   Result Value Ref Range    Retic % 0.8 0.5 - 2.0 %    Retic Absolute 0.036 0.017 - 0.110 x10*6/uL    Reticulocyte Hemoglobin 27 (L) 28 - 38 pg    Immature Retic fraction 7.8 <=16.0 %   Sedimentation rate, automated   Result Value Ref Range    Sedimentation Rate 34 (H) 0 - 30 mm/h   ECG 12 Lead   Result Value Ref Range    Ventricular Rate 79 BPM    Atrial Rate 79 BPM    AL Interval 190 ms    QRS Duration 93 ms    QT Interval 344 ms    QTC Calculation(Bazett) 395 ms    P Axis 69 degrees    R Axis 264 degrees    T Axis 47 degrees    QRS Count 13 beats    Q Onset 249 ms    T Offset 421 ms    QTC Fredericia 377 ms   Urinalysis with Reflex Microscopic   Result Value Ref Range    Color, Urine Yellow Light-Yellow, Yellow, Dark-Yellow    Appearance, Urine Clear Clear    Specific Gravity, Urine 1.021 1.005 - 1.035    pH, Urine 6.0 5.0, 5.5, 6.0, 6.5, 7.0, 7.5, 8.0    Protein, Urine 20 (TRACE) NEGATIVE, 10 (TRACE), 20 (TRACE) mg/dL    Glucose, Urine Normal Normal mg/dL    Blood, Urine NEGATIVE NEGATIVE mg/dL    Ketones, Urine NEGATIVE NEGATIVE mg/dL    Bilirubin, Urine NEGATIVE NEGATIVE mg/dL    Urobilinogen, Urine 2 (1+) (A) Normal mg/dL    Nitrite, Urine NEGATIVE NEGATIVE    Leukocyte Esterase, Urine NEGATIVE  NEGATIVE   Microscopic Only, Urine   Result Value Ref Range    WBC, Urine 1-5 1-5, NONE /HPF    RBC, Urine 1-2 NONE, 1-2, 3-5 /HPF    Squamous Epithelial Cells, Urine 1-9 (SPARSE) Reference range not established. /HPF    Mucus, Urine 1+ Reference range not established. /LPF    Hyaline Casts, Urine 3+ (A) NONE /LPF    Amorphous Crystals, Urine 1+ NONE, 1+, 2+ /HPF   Troponin I, High Sensitivity   Result Value Ref Range    Troponin I, High Sensitivity 52 (HH) 0 - 13 ng/L   C-reactive protein   Result Value Ref Range    C-Reactive Protein 1.25 (H) <1.00 mg/dL   Troponin I, High Sensitivity   Result Value Ref Range    Troponin I, High Sensitivity 67 (HH) 0 - 13 ng/L   CBC   Result Value Ref Range    WBC 3.3 (L) 4.4 - 11.3 x10*3/uL    nRBC 0.0 0.0 - 0.0 /100 WBCs    RBC 4.30 4.00 - 5.20 x10*6/uL    Hemoglobin 11.4 (L) 12.0 - 16.0 g/dL    Hematocrit 34.1 (L) 36.0 - 46.0 %    MCV 79 (L) 80 - 100 fL    MCH 26.5 26.0 - 34.0 pg    MCHC 33.4 32.0 - 36.0 g/dL    RDW 15.9 (H) 11.5 - 14.5 %    Platelets 204 150 - 450 x10*3/uL   Basic metabolic panel   Result Value Ref Range    Glucose 145 (H) 74 - 99 mg/dL    Sodium 141 136 - 145 mmol/L    Potassium 4.7 3.5 - 5.3 mmol/L    Chloride 104 98 - 107 mmol/L    Bicarbonate 29 21 - 32 mmol/L    Anion Gap 13 10 - 20 mmol/L    Urea Nitrogen 13 6 - 23 mg/dL    Creatinine 0.89 0.50 - 1.05 mg/dL    eGFR 69 >60 mL/min/1.73m*2    Calcium 9.6 8.6 - 10.3 mg/dL     *Note: Due to a large number of results and/or encounters for the requested time period, some results have not been displayed. A complete set of results can be found in Results Review.       Results from last 7 days   Lab Units 06/19/25  0501 06/18/25  1456   PROTEIN TOTAL g/dL  --  7.9   BILIRUBIN TOTAL mg/dL  --  0.6   ALK PHOS U/L  --  73   ALT U/L  --  9   AST U/L  --  19   GLUCOSE mg/dL 145* 102*   TSH mIU/L  --  21.72*       Assessment/Plan    Florencia BETTYE Wang is a 69 y.o. female previously known to Dr. Ramicone and Ebony with a  past medical history significant for hypothyroidism, HTN, HLD, mild nonobstructive coronary artery disease (30% mid LAD stenosis in 2022), COPD (on Home O2 as needed), TIA, Hemoglobin C Trait, sickle cell anemia, SLE, epilepsy, and migraines. Patient presented on 6/18/2025 with numerous complaints including lower ext weakness, headache, dysphagia, change in bowel pattern, and unintentional weight loss. Cardiology consulted for Cardiac Clearance for EGD.     Chest Tightness  - She endorses chronic chest tightness and sharp chest pain that occurs in the morning and at night at rest. She occasionally feels chest tightness when walking in her home. Massaging her ribs and deep breaths relieve her pain. Does not use NTG due to chronic migraines. Previously intolerant to IMDUR due to headaches and hypotension. She has been having these symptoms for years.  - Troponin I high sensitivity with chronic mild elevation without delta pattern. Will repeat third to ensure down trending. Likely type II demand in setting of multiple noncardiac medical issues. No concerns for acute ischemic event or further ischemic evaluation recommended.   - EKG showed no acute ischemic changes.   - Her symptoms are atypical and chronic. Cardiac catheterization in July of 2022 revealed nonobstructive CAD -mild stenosis (30%)  in the mid LAD. Patient had an echocardiogram on 11/14/2024 with EF of 67% and normal structure and function. Most recent ischemic evaluation included a normal stress test with normal LV function on 5/9/2025.   - In the absence of any new cardiac complaints and no significant rise in cardiac enzymes or EKG changes concerning for acute ischemic event this admission, patient has low cardiac risk for perioperative events during EGD.  - Continue aspirin 81 mg and atorvastatin 20 mg when cleared from a GI perspective. Patient takes aspirin 81 mg four days per week as she was not able to tolerate daily dosage due to excessive  bruising.     Shortness of Breath   - Chronic shortness of breath due to COPD. Uses PRN home oxygen.   - Patient appears compensated and euvolemic upon exam.   - She has PRN lasix at home that she has not had to use anytime recently    Discussed case with Dr. Beck. Cardiology will sign off. Please call with any questions or concerns.     Jyothi Yost, APRN-CNP            [1]   Past Medical History:  Diagnosis Date    Abnormal weight gain 02/05/2016    Weight gain    Anxiety     Bipolar disorder, unspecified (Multi) 02/05/2016    Bipolar disorder    Migraines     Old myocardial infarction 11/19/2019    History of myocardial infarction    Other fatigue 02/05/2016    Tired    Personal history of (corrected) congenital malformations of heart and circulatory system     History of congenital anomaly of heart    Personal history of other diseases of the circulatory system 02/05/2016    History of congestive heart failure    Personal history of other diseases of the nervous system and sense organs 11/06/2019    History of migraine    Personal history of other diseases of the respiratory system 11/19/2019    History of chronic obstructive lung disease    Personal history of other diseases of the respiratory system 02/05/2016    History of bronchitis    Personal history of other diseases of the respiratory system 02/05/2016    History of asthma    Personal history of other endocrine, nutritional and metabolic disease 10/30/2015    History of hypercholesterolemia    Personal history of other malignant neoplasm of skin 02/05/2016    History of malignant neoplasm of skin    Personal history of other mental and behavioral disorders 02/05/2016    History of depression    Personal history of pneumonia (recurrent) 02/05/2016    History of pneumonia    Personal history of transient ischemic attack (TIA), and cerebral infarction without residual deficits 02/05/2016    History of stroke    Personal history of transient  ischemic attack (TIA), and cerebral infarction without residual deficits 02/05/2016    History of transient cerebral ischemia   [2]   Current Facility-Administered Medications   Medication Dose Route Frequency Provider Last Rate Last Admin    acetaminophen (Tylenol) tablet 650 mg  650 mg oral q6h PRN Nik Arellano APRN-CNP        ALPRAZolam (Xanax) tablet 0.5 mg  0.5 mg oral Once Toribio Ice, DO        D5 % and 0.9 % sodium chloride infusion  75 mL/hr intravenous Continuous Nik Arellano APRN-CNP 75 mL/hr at 06/19/25 0011 75 mL/hr at 06/19/25 0011    enoxaparin (Lovenox) syringe 40 mg  40 mg subcutaneous q24h Nik Arellano APRN-CNP   40 mg at 06/18/25 2204    hydrocortisone sodium succinate (PF) (Solu-CORTEF) injection 200 mg  200 mg intravenous q6h Nik Arellano, APRN-CNP   200 mg at 06/19/25 0852    ipratropium-albuteroL (Duo-Neb) 0.5-2.5 mg/3 mL nebulizer solution 3 mL  3 mL nebulization q6h PRN Nik Arellano APRN-CNP        ondansetron (Zofran) injection 4 mg  4 mg intravenous q6h PRN Nik Arellano, APRN-CNP        oxygen (O2) therapy   inhalation Continuous PRN - O2/gases Nik Arellano APRN-CNP        pantoprazole (Protonix) injection 40 mg  40 mg intravenous Daily Nik Arellano APRN-CNP   40 mg at 06/19/25 0852    polyethylene glycol (Glycolax, Miralax) packet 17 g  17 g oral Daily Nik Arellano APRN-CNP       [3]   Allergies  Allergen Reactions    Cyclobenzaprine Other and Unknown     Mini TIAs    Gabapentin Other and Unknown     Mental status change    Iodinated Contrast Media Swelling, Other and Unknown    Sulfa (Sulfonamide Antibiotics) Hives and Swelling    Sumatriptan Other     stroke    Adhesive Tape-Silicones Other     burns skin    Sertraline Unknown    Adhesive Unknown    Latex Other    Metoprolol Other

## 2025-06-20 ENCOUNTER — APPOINTMENT (OUTPATIENT)
Dept: GASTROENTEROLOGY | Facility: HOSPITAL | Age: 70
DRG: 391 | End: 2025-06-20
Payer: MEDICARE

## 2025-06-20 ENCOUNTER — ANESTHESIA EVENT (OUTPATIENT)
Dept: GASTROENTEROLOGY | Facility: HOSPITAL | Age: 70
End: 2025-06-20
Payer: MEDICARE

## 2025-06-20 ENCOUNTER — ANESTHESIA (OUTPATIENT)
Dept: GASTROENTEROLOGY | Facility: HOSPITAL | Age: 70
End: 2025-06-20
Payer: MEDICARE

## 2025-06-20 LAB
ANION GAP SERPL CALC-SCNC: 10 MMOL/L (ref 10–20)
BACTERIA UR CULT: NORMAL
BUN SERPL-MCNC: 13 MG/DL (ref 6–23)
CALCIUM SERPL-MCNC: 9 MG/DL (ref 8.6–10.3)
CHLORIDE SERPL-SCNC: 108 MMOL/L (ref 98–107)
CO2 SERPL-SCNC: 27 MMOL/L (ref 21–32)
CREAT SERPL-MCNC: 0.82 MG/DL (ref 0.5–1.05)
EGFRCR SERPLBLD CKD-EPI 2021: 77 ML/MIN/1.73M*2
ERYTHROCYTE [DISTWIDTH] IN BLOOD BY AUTOMATED COUNT: 16 % (ref 11.5–14.5)
GLUCOSE SERPL-MCNC: 105 MG/DL (ref 74–99)
HCT VFR BLD AUTO: 30.2 % (ref 36–46)
HGB BLD-MCNC: 10.5 G/DL (ref 12–16)
HOLD SPECIMEN: NORMAL
HOLD SPECIMEN: NORMAL
MCH RBC QN AUTO: 27.2 PG (ref 26–34)
MCHC RBC AUTO-ENTMCNC: 34.8 G/DL (ref 32–36)
MCV RBC AUTO: 78 FL (ref 80–100)
NRBC BLD-RTO: 0 /100 WBCS (ref 0–0)
PLATELET # BLD AUTO: 226 X10*3/UL (ref 150–450)
POTASSIUM SERPL-SCNC: 4.1 MMOL/L (ref 3.5–5.3)
RBC # BLD AUTO: 3.86 X10*6/UL (ref 4–5.2)
SODIUM SERPL-SCNC: 141 MMOL/L (ref 136–145)
WBC # BLD AUTO: 7.4 X10*3/UL (ref 4.4–11.3)

## 2025-06-20 PROCEDURE — 2500000004 HC RX 250 GENERAL PHARMACY W/ HCPCS (ALT 636 FOR OP/ED): Performed by: NURSE PRACTITIONER

## 2025-06-20 PROCEDURE — 3700000001 HC GENERAL ANESTHESIA TIME - INITIAL BASE CHARGE

## 2025-06-20 PROCEDURE — 80048 BASIC METABOLIC PNL TOTAL CA: CPT | Performed by: NURSE PRACTITIONER

## 2025-06-20 PROCEDURE — 3700000002 HC GENERAL ANESTHESIA TIME - EACH INCREMENTAL 1 MINUTE

## 2025-06-20 PROCEDURE — 36415 COLL VENOUS BLD VENIPUNCTURE: CPT | Performed by: STUDENT IN AN ORGANIZED HEALTH CARE EDUCATION/TRAINING PROGRAM

## 2025-06-20 PROCEDURE — 88305 TISSUE EXAM BY PATHOLOGIST: CPT | Mod: TC,PARLAB,WESLAB | Performed by: INTERNAL MEDICINE

## 2025-06-20 PROCEDURE — 2500000001 HC RX 250 WO HCPCS SELF ADMINISTERED DRUGS (ALT 637 FOR MEDICARE OP): Performed by: STUDENT IN AN ORGANIZED HEALTH CARE EDUCATION/TRAINING PROGRAM

## 2025-06-20 PROCEDURE — 83519 RIA NONANTIBODY: CPT | Performed by: STUDENT IN AN ORGANIZED HEALTH CARE EDUCATION/TRAINING PROGRAM

## 2025-06-20 PROCEDURE — 2500000001 HC RX 250 WO HCPCS SELF ADMINISTERED DRUGS (ALT 637 FOR MEDICARE OP): Performed by: PHYSICIAN ASSISTANT

## 2025-06-20 PROCEDURE — 2500000002 HC RX 250 W HCPCS SELF ADMINISTERED DRUGS (ALT 637 FOR MEDICARE OP, ALT 636 FOR OP/ED): Performed by: PHYSICIAN ASSISTANT

## 2025-06-20 PROCEDURE — 1200000002 HC GENERAL ROOM WITH TELEMETRY DAILY

## 2025-06-20 PROCEDURE — 91040 ESOPH BALLOON DISTENSION TST: CPT | Performed by: INTERNAL MEDICINE

## 2025-06-20 PROCEDURE — 43239 EGD BIOPSY SINGLE/MULTIPLE: CPT | Performed by: INTERNAL MEDICINE

## 2025-06-20 PROCEDURE — 2500000004 HC RX 250 GENERAL PHARMACY W/ HCPCS (ALT 636 FOR OP/ED): Performed by: PHYSICIAN ASSISTANT

## 2025-06-20 PROCEDURE — 99231 SBSQ HOSP IP/OBS SF/LOW 25: CPT | Performed by: PHYSICIAN ASSISTANT

## 2025-06-20 PROCEDURE — 99232 SBSQ HOSP IP/OBS MODERATE 35: CPT | Performed by: NURSE PRACTITIONER

## 2025-06-20 PROCEDURE — 85027 COMPLETE CBC AUTOMATED: CPT | Performed by: NURSE PRACTITIONER

## 2025-06-20 PROCEDURE — 2500000004 HC RX 250 GENERAL PHARMACY W/ HCPCS (ALT 636 FOR OP/ED)

## 2025-06-20 PROCEDURE — 7100000002 HC RECOVERY ROOM TIME - EACH INCREMENTAL 1 MINUTE

## 2025-06-20 PROCEDURE — 83516 IMMUNOASSAY NONANTIBODY: CPT | Performed by: STUDENT IN AN ORGANIZED HEALTH CARE EDUCATION/TRAINING PROGRAM

## 2025-06-20 PROCEDURE — 2720000007 HC OR 272 NO HCPCS

## 2025-06-20 PROCEDURE — 7100000001 HC RECOVERY ROOM TIME - INITIAL BASE CHARGE

## 2025-06-20 PROCEDURE — 99232 SBSQ HOSP IP/OBS MODERATE 35: CPT | Performed by: STUDENT IN AN ORGANIZED HEALTH CARE EDUCATION/TRAINING PROGRAM

## 2025-06-20 RX ORDER — PROPOFOL 10 MG/ML
INJECTION, EMULSION INTRAVENOUS AS NEEDED
Status: DISCONTINUED | OUTPATIENT
Start: 2025-06-20 | End: 2025-06-20

## 2025-06-20 RX ORDER — LIDOCAINE HCL/PF 100 MG/5ML
SYRINGE (ML) INTRAVENOUS AS NEEDED
Status: DISCONTINUED | OUTPATIENT
Start: 2025-06-20 | End: 2025-06-20

## 2025-06-20 RX ADMIN — ENOXAPARIN SODIUM 40 MG: 100 INJECTION SUBCUTANEOUS at 20:36

## 2025-06-20 RX ADMIN — LEVOTHYROXINE SODIUM 100 MCG: 0.1 TABLET ORAL at 05:34

## 2025-06-20 RX ADMIN — ATORVASTATIN CALCIUM 20 MG: 20 TABLET, FILM COATED ORAL at 09:57

## 2025-06-20 RX ADMIN — OXYCODONE HYDROCHLORIDE AND ACETAMINOPHEN 1 TABLET: 5; 325 TABLET ORAL at 20:36

## 2025-06-20 RX ADMIN — PREDNISONE 10 MG: 10 TABLET ORAL at 09:57

## 2025-06-20 RX ADMIN — HYDROXYCHLOROQUINE SULFATE 200 MG: 200 TABLET, FILM COATED ORAL at 09:57

## 2025-06-20 RX ADMIN — DULOXETINE 60 MG: 30 CAPSULE, DELAYED RELEASE ORAL at 09:57

## 2025-06-20 RX ADMIN — PANTOPRAZOLE SODIUM 40 MG: 40 INJECTION, POWDER, FOR SOLUTION INTRAVENOUS at 09:59

## 2025-06-20 RX ADMIN — PROPOFOL 50 MG: 10 INJECTION, EMULSION INTRAVENOUS at 14:43

## 2025-06-20 RX ADMIN — LIDOCAINE HYDROCHLORIDE 100 MG: 20 INJECTION, SOLUTION INTRAVENOUS at 14:43

## 2025-06-20 RX ADMIN — PROPOFOL 50 MG: 10 INJECTION, EMULSION INTRAVENOUS at 14:46

## 2025-06-20 RX ADMIN — PROPOFOL 50 MG: 10 INJECTION, EMULSION INTRAVENOUS at 14:45

## 2025-06-20 RX ADMIN — PROPOFOL 100 MCG/KG/MIN: 10 INJECTION, EMULSION INTRAVENOUS at 14:44

## 2025-06-20 SDOH — HEALTH STABILITY: MENTAL HEALTH: CURRENT SMOKER: 1

## 2025-06-20 ASSESSMENT — PAIN SCALES - GENERAL
PAINLEVEL_OUTOF10: 8
PAINLEVEL_OUTOF10: 7
PAINLEVEL_OUTOF10: 0 - NO PAIN

## 2025-06-20 ASSESSMENT — PAIN - FUNCTIONAL ASSESSMENT
PAIN_FUNCTIONAL_ASSESSMENT: 0-10

## 2025-06-20 ASSESSMENT — PAIN DESCRIPTION - DESCRIPTORS: DESCRIPTORS: ACHING

## 2025-06-20 NOTE — PROGRESS NOTES
Florencia Wang is a 69 y.o. female on day 2 of admission presenting with Generalized weakness.    Nursing called about diet orders.  Speech therapy consult placed for evaluation.  Bedside nurse perform swallow evaluation which patient passed and will be okay to start on puréed, thin liquid diet until further evaluated by speech therapy.      Oren Deal PA-C

## 2025-06-20 NOTE — ANESTHESIA POSTPROCEDURE EVALUATION
Patient: Florencia WEN [place]    Procedure Summary       Date: 06/20/25 Room / Location: [facility]    Anesthesia Start: 1440 Anesthesia Stop:     Procedure: EGD Diagnosis: Esophageal dysmotility    Scheduled Providers: Devin Tran MD Responsible Provider: Dayne Smalls MD    Anesthesia Type: MAC ASA Status: 3            Anesthesia Type: MAC    Vitals Value Taken Time   /65 06/20/25 15:00   Temp 36.5 °C (97.7 °F) 06/20/25 15:00   Pulse 79 06/20/25 15:00   Resp 14 06/20/25 15:00   SpO2 97 % 06/20/25 15:00       Anesthesia Post Evaluation    Patient location during evaluation: PACU  Patient participation: complete - patient participated  Level of consciousness: awake and alert  Pain management: adequate  Airway patency: patent  Cardiovascular status: acceptable  Respiratory status: acceptable  Hydration status: acceptable  Postoperative Nausea and Vomiting: none      There were no known notable events for this encounter.

## 2025-06-20 NOTE — CARE PLAN
The patient's goals for the shift include      The clinical goals for the shift include maintain safety    Over the shift, the patient is going to be monitored by vital signs

## 2025-06-20 NOTE — PROGRESS NOTES
"Florencia Wang is a 69 y.o. female on day 2 of admission presenting with Generalized weakness.    Subjective   Feeling well, no belly pain or nausea       Objective     Physical Exam  Constitutional:       Appearance: Normal appearance.   HENT:      Head: Normocephalic and atraumatic.      Right Ear: Tympanic membrane and ear canal normal.      Left Ear: Tympanic membrane and ear canal normal.      Mouth/Throat:      Mouth: Mucous membranes are moist.      Pharynx: Oropharynx is clear.   Eyes:      Extraocular Movements: Extraocular movements intact.      Conjunctiva/sclera: Conjunctivae normal.      Pupils: Pupils are equal, round, and reactive to light.   Cardiovascular:      Rate and Rhythm: Normal rate and regular rhythm.      Pulses: Normal pulses.      Heart sounds: Normal heart sounds.   Pulmonary:      Effort: Pulmonary effort is normal.      Breath sounds: Normal breath sounds.   Abdominal:      General: Abdomen is flat. Bowel sounds are normal.      Palpations: Abdomen is soft.   Musculoskeletal:         General: Normal range of motion.      Cervical back: Normal range of motion and neck supple.   Skin:     General: Skin is warm and dry.      Capillary Refill: Capillary refill takes 2 to 3 seconds.   Neurological:      General: No focal deficit present.      Mental Status: She is alert and oriented to person, place, and time. Mental status is at baseline.   Psychiatric:         Mood and Affect: Mood normal.         Behavior: Behavior normal.         Thought Content: Thought content normal.         Judgment: Judgment normal.         Last Recorded Vitals  Blood pressure 124/74, pulse 69, temperature 36 °C (96.8 °F), temperature source Temporal, resp. rate 16, height 1.88 m (6' 2.02\"), weight 65.3 kg (143 lb 15.4 oz), SpO2 93%.  Intake/Output last 3 Shifts:  No intake/output data recorded.    Relevant Results            This patient currently has cardiac telemetry ordered; if you would like to modify or " discontinue the telemetry order, click here to go to the orders activity to modify/discontinue the order.            Malnutrition Diagnosis Status: New  Malnutrition Diagnosis: Severe malnutrition related to starvation  Related to: decreased appetite, inability to swallow  As Evidenced by: severe muscle wasting and severe subcutaneous fat loss, likely PO intakes meeting <=50% of EEN for >=1 month;  I agree with the dietitian's malnutrition diagnosis.      Assessment & Plan  Generalized weakness    History of migraine    Elevated troponin level    Dysphagia, unspecified    Unintentional weight loss    Lower extremity weakness    Change in bowel movement    Vision changes    Allergy to imaging contrast media    Esophageal dysmotility    Chronic issues:   #COPD   #Hypertension   #Hyperlipidemia   #TIA   #Arthritis   #SLE   #Hemoglobin C trait   #Osteoarthritis   #Hypothyroidism   #Migraines      Continue with patient's home medications as appropriate once entered by nursing  Supplemental oxygen at night and as needed     #Dvt ppx  Lovenox subcutaneous  SCD    6/20: CT reviewd. Lots of findings, lots nonspecific, will get EGD today to evalaute distention.      I spent 35 minutes in the professional and overall care of this patient.      Toribio Reyna, DO

## 2025-06-20 NOTE — CARE PLAN
EGD with endoflip performed today.  Concerns for esophageal dysmotility.  Gastritis also notated.  Pathology pending.  Diet per SLP.    Patient can follow up as an outpatient.    Will sign off.

## 2025-06-20 NOTE — PROGRESS NOTES
"Florencia Wang is a 69 y.o. female on day 2 of admission presenting with Generalized weakness.      Subjective   Patient scheduled to undergo EGD today with GI for complaints of worsening dysphagia resulting in secondary weight loss.  Labs have been sent for myasthenia gravis rule out, with results pending.  Typically they do take around 5 days to result.  Given that patient is stable from a neurologic standpoint, we will sign off and recommend for patient to follow-up in the outpatient setting in 1 month postdischarge.       Objective     Last Recorded Vitals  Blood pressure 110/62, pulse 71, temperature 36.2 °C (97.2 °F), temperature source Temporal, resp. rate 20, height 1.88 m (6' 2.02\"), weight 65.3 kg (143 lb 15.4 oz), SpO2 97%.    Relevant Results  Scheduled medications  Scheduled Medications[1]  Continuous medications  Continuous Medications[2]  PRN medications  PRN Medications[3]  CT chest abdomen pelvis w IV contrast  Result Date: 6/19/2025  Interpreted By:  Von Bender, STUDY: CT CHEST ABDOMEN PELVIS W IV CONTRAST;  6/19/2025 1:55 pm   INDICATION: Signs/Symptoms:unintentional weight loss, difficulty with defecation, dysphagia, lower ext weakness, family hx cancer. Hx smoking.   COMPARISON: CT AP 12/21/2022, CT chest 01/17/2021   ACCESSION NUMBER(S): [id number]   ORDERING CLINICIAN: STEPHANIE DAVIS   TECHNIQUE: CT of the chest, abdomen, and pelvis was performed.  Contiguous axial images were obtained at 3 mm slice thickness through the chest, abdomen and pelvis. Coronal and sagittal reconstructions at 3 mm slice thickness were performed. 75 ml of Omnipaque 350 were administered intravenously without immediate complication.   FINDINGS: CHEST:   LUNGS/PLEURA/LARGE AIRWAYS: Mild diffuse bronchial wall thickening. Mild patchy bilateral lower lobe and lung base infiltrates/atelectasis right greater than left. Mild focal nodular thickening along the right major fissure superiorly up to 5 mm thickness image 102. " Some posterior pleural-based nodularity/nodular foci of pleural thickening for example up to 9 x 3 mm on the left image 140. Some areas of scarring/atelectasis bilaterally. No pleural effusions.   MEDIASTINUM AND TERRENCE: No adenopathy by size criteria.   ESOPHAGUS: Mild gaseous distention of the mid esophagus of uncertain significance but grossly similar to prior. Small hiatal hernia.   HEART: Stable size. Trace pericardial effusion. Coronary arteries show atherosclerotic calcifications but otherwise suboptimally evaluated.   VASCULAR: Thoracic aorta is mildly aneurysmal at the ascending segment up to 4.1 cm caliber and mild diffuse prominence descending segment up to 3 cm caliber. Mildly tortuous descending segment. Moderate vascular calcifications.. Limited evaluation for pulmonary emboli on nondedicated exam.   CHEST WALL AND LOWER NECK: No definite new suspicious axillary adenopathy. Opacification of some vascular collaterals along the right chest wall and subscapular region following right upper extremity intravenous injection of uncertain significance.   BONES: Bones appear demineralized. Degenerative changes visualized spine.     ABDOMEN:   LIVER: No definite suspicious hepatic lesion. Suspected fatty liver.   BILE DUCTS: No ductal dilatation.   GALLBLADDER: Mildly distended gallbladder without definite opaque stones.   SPLEEN: Within normal limits.   PANCREAS: Within normal limits.   ADRENAL GLANDS: Within normal limits.   KIDNEYS AND URETERS: No definite renal stones or hydronephrosis. A few small hypoattenuating foci posterior upper pole left kidney too small to characterize. Approximate 1.6 cm focal hypoattenuation mid upper pole right kidney probably representing prominent medullary component with underlying lesion less likely. Mild cortical regularity posterolaterally probably due to scarring. Mild lobulated appearance of the kidneys. Ureters are not well visualized. No definite evidence for significant  hydroureter.   BOWEL: Multiple prominent mildly distended gas-filled small bowel loops in the left upper quadrant and some mildly distended gas-filled and fluid-filled small bowel loops elsewhere scattered throughout up to 3.7 cm in the left pelvis image 144. There are also some relatively collapsed small bowel loops in the left abdomen and pelvis overall of uncertain significance. Prominent colonic stool throughout. Tortuous and redundant colon throughout. Suggestion of some mild wall thickening involving colonic loops within the pelvis. There is some foci of relatively peripheral oriented gas within colonic loops of within the pelvis felt most likely relating to to represent peripherally displaced intraluminal gas related to prominent stool rather than pneumatosis. Appendix is not definitely seen. Wall thickening versus incomplete distention of the stomach and portions of duodenum.   PERITONEUM/RETROPERITONEUM/LYMPH NODES: No free air.  No free fluid or focal collection. Mildly prominent nonspecific mesenteric nodes scattered throughout. No pathologic adenopathy.   VASCULAR: Abdominal aorta is moderately atherosclerotic without aneurysm. Approximate 8 mm rounded calcific density apparently within the IVC near the junction with the right renal vein image 107, suggestive of calcified chronic thrombosis and was present previously and similar in appearance. Heterogeneous relative hypoattenuation of the iliac and femoral veins probably due to admixture of opacified and unopacified blood and timing of imaging rather than sequela of venous thrombosis.   PELVIS:   BLADDER: Mild nonspecific urinary bladder wall thickening.   REPRODUCTIVE ORGANS: Suboptimally evaluated by CT but grossly stable in appearance.   ABDOMINAL WALL: Small to moderate fat containing mid to lower anterior abdominal wall/periumbilical hernia overall slightly decreased compared to prior. Some foci of gaseous lucency in the left lower anterior  abdominal wall subcutaneous fat may relate to subcutaneous medicine injection.   BONES: Bones appear demineralized. Minimal anterolisthesis L4 on L5 and mild retrolisthesis L5 on S1. Minimal anterolisthesis L3 on L4. Marked arthritic changes of the hip joints. Arthritic changes about the SI joints. Degenerative changes visualized spine. Heterogeneous and somewhat serpiginous sclerotic foci at the femoral heads bilaterally particular on the left can be seen in the setting of avascular necrosis.   ADDITIONAL FINDINGS: None significant.       CHEST: 1.  Mild focal nodular thickening along the right major fissure superiorly as described as well as some posterior pleural-based nodularity/nodular foci of pleural thickening bilaterally. Consider follow-up chest CT in 3-6 months to evaluate stability. 2. Mild patchy bilateral lower lobe and lung base infiltrates/atelectasis right greater than left. 3. Mild gaseous distention at the mid esophagus of uncertain significance but similar to prior. 4. Mild aneurysmal dilatation thoracic aorta up to 4.1 cm at the ascending segment. 5. Opacification of some vascular collaterals along the right chest wall and subscapular region following right upper extremity intravenous injection of uncertain significance. Correlation with duplex sonography of the upper extremity may be considered for further assessment as clinically warranted.   ABDOMEN-PELVIS: 1.  Multiple prominent gas-filled bowel loops in the left upper quadrant which were not present previously and some mildly distended gas-filled and fluid-filled small bowel loops elsewhere scattered throughout along with some relative collapsed small bowel loops in the left abdomen and pelvis, raising concern for the possibility of at least partial or developing obstruction. Considering somewhat grouped appearance of small bowel loops in the left upper abdomen, the possibility of an internal hernia cannot be excluded. However no definite  associated suspicious wall thickening or regional stranding. There is also suggestion of some mild wall thickening involving colonic loops within the pelvis with prominent colonic stool raising concern for mild colitis including infectious/inflammatory or stercoral colitis with underlying ischemic component less likely. Foci of relative peripheral oriented gas involving these colonic loops felt most likely to represent peripherally displaced intraluminal gas rather than foci of pneumatosis. Fairly diffusely tortuous and redundant colon. Close clinical correlation and follow-up advised. 2. Mildly distended gallbladder without definite opaque stones. 3. Approximate 1.6 cm focal hypoattenuation mid upper pole right kidney probably representing prominent medullary component with underlying lesion less likely. Mild cortical regularity posterolaterally probably due to scarring. A few small hypoattenuating foci posterior upper left kidney too small to characterize. Consider ultrasound for further assessment. 4. Wall thickening versus incomplete distention of the stomach and portions of the duodenum could reflect gastritis/duodenitis.. 5. Approximate 8 mm rounded calcific density within the IVC suggestive chronic calcified thrombosis which is unchanged since prior. Heterogeneous relative hypoattenuation of iliac and femoral veins probably due to phase of contrast opacification rather than sequela venous thrombosis. Consider duplex sonography for further assessment as clinically warranted. 6. Nonspecific urinary bladder wall thickening. 7. Small to moderate fat containing mid to lower anterior abdominal wall/periumbilical hernia slightly decreased compared to prior. Some foci of gaseous lucency in the left lower anterior abdominal wall subcutaneous fat may relate to subcutaneous medicine injection. 8. Marked arthritic changes of the hip joints including with heterogeneous somewhat serpiginous sclerotic foci at the femoral  heads particular on the left which can be seen in the setting of avascular necrosis. 9. Additional findings as above.     MACRO: Critical Finding:  See findings. Notification was initiated on 6/19/2025 at 3:46 pm by  Von Bender.  (**-OCF-**) Instructions:   Signed by: Von Bender 6/19/2025 3:50 PM Dictation workstation:   [id number]    CT lumbar spine retrospective reconstruction protocol  Result Date: 6/19/2025  Interpreted By:  Jayla Hua, STUDY: CT LUMBAR SPINE RETROSPECTIVE RECONSTRUCTION PROTOCOL;  6/19/2025 1:55 pm   INDICATION: Signs/Symptoms:unintentional weight loss, difficulty with defecation, dysphagia, lower ext weakness, family hx cancer. Hx smoking..     COMPARISON: None.   ACCESSION NUMBER(S): [id number]   ORDERING CLINICIAN: STEPHANIE DAVIS   TECHNIQUE: Axial images through the lumbar spine. Sagittal and coronal reconstructions were performed.   FINDINGS: For counting purposes the last lumbarized vertebral body is labeled L5.   There is grade 1 anterolisthesis of L3 on L4 and L4 on L5. Alignment and vertebral body heights are otherwise maintained.   No CT evidence of a focal osseous lesion.   Mild disc height loss at L4-L5 and L5-S1.   Please see CT chest abdomen and pelvis for evaluation of the soft tissues.   Evaluation by level:   T12-L1 no spinal canal or neural foraminal stenosis.   L1-L2: Mild disc bulge and facet arthrosis. No spinal canal or neural foraminal stenosis.   L2-L3: Mild disc bulge and facet arthrosis. Mild spinal canal stenosis. Mild neural foraminal stenosis   L3-L4: Disc uncovering, disc bulge and facet arthrosis. Mild-to-moderate spinal canal stenosis. Moderate neural foraminal stenosis.   L4-L5: Disc uncovering, disc bulge and facet arthrosis. Moderate spinal canal stenosis. Moderate neural foraminal stenosis.   L5-S1: Disc bulge and facet arthrosis. No spinal canal stenosis. Moderate neural foraminal stenosis.       No CT evidence of a focal osseous lesion.    Degenerative changes as detailed above.   MACRO: None   Signed by: Jayla Hua 6/19/2025 2:50 PM Dictation workstation:   [id number]    CT thoracic spine retrospective reconstruction protocol  Result Date: 6/19/2025  Interpreted By:  Jayla Hua, STUDY: CT THORACIC SPINE RETROSPECTIVE RECONSTRUCTION PROTOCOL;  6/19/2025 1:55 pm   INDICATION: Signs/Symptoms:unintentional weight loss, difficulty with defecation, dysphagia, lower ext weakness, family hx cancer. Hx smoking.     COMPARISON: None.   ACCESSION NUMBER(S): [id number]   ORDERING CLINICIAN: STEPHANIE DAVIS   TECHNIQUE: Axial images through the thoracic spine. Sagittal and coronal reconstructions were performed.   FINDINGS: For counting purposes the 1st rib-bearing vertebral body is labeled T1.   There is exaggerated thoracic kyphosis. Alignment and vertebral body heights are maintained.   Sclerotic foci within the C7 spinous process, T3 transverse process on the left and T5 vertebral body likely represent bone islands.   No CT evidence of an additional focal osseous lesion.   There is no evidence of high-grade spinal canal or neural foraminal stenosis.   Please see CT chest abdomen and pelvis for evaluation of the soft tissues. Prevertebral soft tissues are not thickened.       Sclerotic foci within the C7 spinous process, T3 transverse process on the left and T5 vertebral body likely represent bone islands. These were present on the prior exam 03/31/2020.   No CT evidence of an additional focal osseous lesion.   MACRO: None   Signed by: Jayla Hua 6/19/2025 2:46 PM Dictation workstation:   [id number]    CT cervical spine wo IV contrast  Result Date: 6/19/2025  Interpreted By:  Jayla Hua, STUDY: CT CERVICAL SPINE WO IV CONTRAST;  6/19/2025 1:55 pm   INDICATION: Signs/Symptoms:unintentional weight loss, difficulty with defecation, dysphagia, lower ext weakness, family hx cancer. Hx smoking.     COMPARISON: None.   ACCESSION NUMBER(S): [id number]    ORDERING CLINICIAN: STEPHANIE DAVIS   TECHNIQUE: Axial CT images of the cervical spine are obtained. Axial, coronal and sagittal reconstructions are provided for review.   FINDINGS: Craniocervical junction is within normal limits. Atlantodental interval within normal limits.   There is trace anterolisthesis of C7 on T1. Alignment is otherwise maintained.   Vertebral body heights are maintained.   Small lucent lesion measuring 0.6 cm within the anterior C5 vertebral body is unchanged compared to the prior CT 02/06/2019.   There is a sclerotic focus within the T3 transverse process on the left which likely represents a bone island, unchanged.   No CT evidence of an additional focal osseous lesion.   Mild disc height loss at C2-C3 and C3-C4. Prevertebral soft tissues are not thickened.   Please see CT chest performed the same day for lung findings.   Evaluation by level:   C1-C2: No spinal canal stenosis.   C2-C3: Disc osteophyte complex, uncovertebral joint hypertrophy and facet arthrosis. No spinal canal stenosis. Mild neural foraminal stenosis.   C3-C4: Disc osteophyte complex, uncovertebral joint hypertrophy and facet arthrosis. Mild spinal canal stenosis. Moderate to severe left and moderate right neural foraminal stenosis.   C4-C5: Disc osteophyte complex, uncovertebral joint hypertrophy and facet arthrosis. No spinal canal stenosis. Mild neural foraminal stenosis.   C5-C6: Left eccentric disc osteophyte complex, uncovertebral joint hypertrophy and facet arthrosis. Mild spinal canal stenosis. Moderate left and mild right neural foraminal stenosis.   C6-C7: No spinal canal or neural foraminal stenosis.   C7-T1: No spinal canal or neural foraminal stenosis.       Unchanged subcentimeter lucent lesion within the anterior C5 vertebral body may represent a hemangioma.   Unchanged sclerotic focus within the T3 transverse process on the left and within the C7 spinous process which may represent a bone islands.   No CT  evidence of an additional focal lesion.   Degenerative changes as detailed above without high-grade spinal canal stenosis. Varying degrees of neural foraminal narrowing as detailed above.   MACRO: None   Signed by: Jayla Hua 6/19/2025 2:34 PM Dictation workstation:   [id number]    ECG 12 Lead  Result Date: 6/19/2025  Sinus rhythm Atrial premature complex Left anterior fascicular block Right ventricular hypertrophy ST elevation, consider inferior injury    CT head wo IV contrast  Result Date: 6/18/2025  Interpreted By:  William Shane, STUDY: CT HEAD WO IV CONTRAST; 6/18/2025 3:25 pm   INDICATION: Signs/Symptoms:trauma   COMPARISON: June 2003.   ACCESSION NUMBER(S): [id number]   ORDERING CLINICIAN: HARJIT MAI   TECHNIQUE: Axial images were obtained through the brain. No IV contrast was administered.   All CT examinations are performed with one or more of the following dose reduction techniques: Automated Exposure Control, adjustment of mA and/or kV according to patient size, or use of iterative reconstruction techniques.   FINDINGS: The ventricles are normal in size for patient's age and midline in position. Patchy periventricular hypodensities are present suggestive of small vessel ischemic white matter disease. There is no intracranial hemorrhage. No mass or mass effect is seen. The osseus structures are unremarkable.       No without acute intracranial process.   Signed by: William Shane 6/18/2025 3:36 PM Dictation workstation:   [id number]    XR chest 1 view  Result Date: 6/18/2025  Interpreted By:  Ta Avila, STUDY: XR CHEST 1 VIEW;  6/18/2025 2:58 pm   INDICATION: Signs/Symptoms:cough.     COMPARISON: 02/15/2023   ACCESSION NUMBER(S): [id number]   ORDERING CLINICIAN: HARJIT MAI   FINDINGS: Single AP portable radiographic view of the chest was provided.       CARDIOMEDIASTINAL SILHOUETTE: Cardiomediastinal silhouette is normal in size and configuration.   LUNGS: Negative for  pneumonia, edema or mass. No pneumothorax or pleural effusion.   ABDOMEN: Grossly unremarkable.   BONES: No acute osseous abnormality.       No radiographic evidence of acute cardiopulmonary abnormality.     MACRO: None   Signed by: Ta Avila 6/18/2025 3:12 PM Dictation workstation:   [id number]    Results for orders placed or performed during the hospital encounter of 06/18/25 (from the past 24 hours)   Troponin I, High Sensitivity   Result Value Ref Range    Troponin I, High Sensitivity 28 (H) 0 - 13 ng/L   Valproic acid level, total   Result Value Ref Range    Valproic Acid 27 (L) 50 - 100 ug/mL   POCT GLUCOSE   Result Value Ref Range    POCT Glucose 176 (H) 74 - 99 mg/dL   SST TOP   Result Value Ref Range    Extra Tube Hold for add-ons.    Lavender Top   Result Value Ref Range    Extra Tube Hold for add-ons.    CBC   Result Value Ref Range    WBC 7.4 4.4 - 11.3 x10*3/uL    nRBC 0.0 0.0 - 0.0 /100 WBCs    RBC 3.86 (L) 4.00 - 5.20 x10*6/uL    Hemoglobin 10.5 (L) 12.0 - 16.0 g/dL    Hematocrit 30.2 (L) 36.0 - 46.0 %    MCV 78 (L) 80 - 100 fL    MCH 27.2 26.0 - 34.0 pg    MCHC 34.8 32.0 - 36.0 g/dL    RDW 16.0 (H) 11.5 - 14.5 %    Platelets 226 150 - 450 x10*3/uL   Basic Metabolic Panel   Result Value Ref Range    Glucose 105 (H) 74 - 99 mg/dL    Sodium 141 136 - 145 mmol/L    Potassium 4.1 3.5 - 5.3 mmol/L    Chloride 108 (H) 98 - 107 mmol/L    Bicarbonate 27 21 - 32 mmol/L    Anion Gap 10 10 - 20 mmol/L    Urea Nitrogen 13 6 - 23 mg/dL    Creatinine 0.82 0.50 - 1.05 mg/dL    eGFR 77 >60 mL/min/1.73m*2    Calcium 9.0 8.6 - 10.3 mg/dL     *Note: Due to a large number of results and/or encounters for the requested time period, some results have not been displayed. A complete set of results can be found in Results Review.                         [place] Coma Scale  Best Eye Response: Spontaneous  Best Verbal Response: Oriented  Best Motor Response: Follows commands  [place] Coma Scale Score: 15                              This patient currently has cardiac telemetry ordered; if you would like to modify or discontinue the telemetry order, click here to go to the orders activity to modify/discontinue the order.  Assessment & Plan  Generalized weakness    History of migraine    Dysphagia, unspecified    Esophageal dysmotility    -EGD scheduled today with GI for complaints of worsening dysphagia.  If findings are unremarkable, it is recommended for patient to be evaluated by ENT, which can be completed in the outpatient setting.  Review of [facility] does note an established diagnosis of dysphagia in 2023.  -Labs have been sent to rule out myasthenia gravis in this patient; typically they take 5 days to result.  Given that she is stable from a neurologic standpoint, she is appropriate for discharge from a neurologic standpoint and is recommended to follow-up with this specialty in the outpatient setting within the next month.  She has noted she is tired of taking so many medications and would prefer to be off of Depakote for management of her migraines.  Emgality injections are instead recommended in this patient as they are only required once a month, but they cannot be started in the inpatient setting due to formulary restrictions/requiring prior authorization from insurance.  Patient to contact Dr. Paulson's office once she is discharged to receive electronic prescription for this medication if covered by insurance.  She will receive a loading dose of 240 mg subcutaneously x 1 dose for the first month, and then we will proceed with administration of 120 mg subcutaneously once a month, again if approved by insurance.  -Recommendations for needs during hospitalization and at discharge via PT, OT, and SLP.  -Continue promotion of lifestyle modifications, such as: Strict BP and glycemic control, dietary habit changes, incorporation of daily exercise regimen, adherence to all prescription/OTC medication schedules, attendance to  all follow-up appointments, cessation from smoking if applicable, abstinence from alcohol and illicit drug use if applicable  -Patient to follow-up with PCP in 1 to 2 weeks postdischarge  -Patient to follow-up with either Dr. Marco A Paulson or Mynor Ashraf-neurology nurse practitioner, in 1 month postdischarge.     Neurology to sign off at this time. Thank you for the opportunity to be a part of this patient's multidisciplinary treatment team.  If any additional questions or concerns arise, please do not hesitate to contact me or the on-call neurologist via [employer] Secure Chat.    I spent 30 minutes in the professional and overall care of this patient.      Adriana Polanco, APRN-CNP       [1] ALPRAZolam, 0.5 mg, oral, Once  aspirin, 81 mg, oral, Every Sun/Tues/Thur/Sat  atorvastatin, 20 mg, oral, Daily  DULoxetine, 60 mg, oral, Daily  enoxaparin, 40 mg, subcutaneous, q24h  hydroxychloroquine, 200 mg, oral, Daily  levothyroxine, 100 mcg, oral, Daily  pantoprazole, 40 mg, intravenous, Daily  polyethylene glycol, 17 g, oral, Daily  potassium chloride CR, 20 mEq, oral, Daily  predniSONE, 10 mg, oral, Every other day  [2]    [3] PRN medications: acetaminophen, fluticasone, ipratropium-albuteroL, nitroglycerin, ondansetron, oxyCODONE-acetaminophen, oxygen, zolpidem

## 2025-06-20 NOTE — ANESTHESIA PREPROCEDURE EVALUATION
Patient: Florencia WEN Milton    Procedure Information       Date/Time: 06/20/25 1330    Scheduled providers: Devin Tran MD    Procedure: EGD    Location: [facility]            Relevant Problems   Cardiac   (+) CAD (coronary artery disease)   (+) Chest pain   (+) Hyperlipidemia   (+) Old myocardial infarction      Pulmonary   (+) Chronic obstructive pulmonary disease, unspecified   (+) Orthopnea      Neuro   (+) Bilateral lumbar radiculopathy   (+) Cerebral aneurysm, nonruptured (HHS-HCC)   (+) Major depressive disorder, single episode, unspecified   (+) Opioid abuse with unspecified opioid-induced disorder (Multi)   (+) Transient ischemic attack      GI   (+) Dysphagia, unspecified   (+) GI bleeding   (+) Hiatal hernia      Endocrine   (+) Hypothyroidism   (+) Obesity, unspecified      Hematology   (+) Acute blood loss anemia   (+) Anemia      Musculoskeletal   (+) Primary osteoarthritis of both knees   (+) Rheumatoid arthritis, unspecified   (+) Scoliosis, unspecified   (+) Unspecified osteoarthritis, unspecified site      HEENT   (+) Sensorineural hearing loss (SNHL) of both ears      ID   (+) Disease due to severe acute respiratory syndrome coronavirus 2 (SARS-CoV-2)       Clinical information reviewed:    Allergies  Meds               NPO Detail:  No data recorded     Physical Exam    Airway  Mallampati: I  TM distance: >3 FB  Neck ROM: full  Mouth opening: 3 or more finger widths     Cardiovascular - normal exam   Dental     (+) upper dentures     Pulmonary - normal exam   Abdominal - normal exam           Anesthesia Plan    History of general anesthesia?: yes  History of complications of general anesthesia?: no    ASA 3     MAC     The patient is a current smoker.  Patient was previously instructed to abstain from smoking on day of procedure.  Patient did not smoke on day of procedure.    intravenous induction   Anesthetic plan and risks discussed with patient.  Use of blood products discussed with  patient who consented to blood products.    Plan discussed with CRNA.

## 2025-06-20 NOTE — CARE PLAN
The patient's goals for the shift include      The clinical goals for the shift include Remain hemodynamically stable      Problem: Pain - Adult  Goal: Verbalizes/displays adequate comfort level or baseline comfort level  Outcome: Progressing     Problem: Safety - Adult  Goal: Free from fall injury  Outcome: Progressing     Problem: Discharge Planning  Goal: Discharge to home or other facility with appropriate resources  Outcome: Progressing     Problem: Chronic Conditions and Co-morbidities  Goal: Patient's chronic conditions and co-morbidity symptoms are monitored and maintained or improved  Outcome: Progressing     Problem: Nutrition  Goal: Nutrient intake appropriate for maintaining nutritional needs  Outcome: Progressing

## 2025-06-20 NOTE — PROGRESS NOTES
06/20/25 1353   Discharge Planning   Home or Post Acute Services In home services   Type of Home Care Services Home nursing visits;Home OT;Home PT   Expected Discharge Disposition Home H     TCC attempted to meet with patient to discuss HHC choices and provide HHC list, patient off the floor at this time.  Care transitions to follow.

## 2025-06-21 LAB
ALBUMIN SERPL BCP-MCNC: 3.1 G/DL (ref 3.4–5)
ALP SERPL-CCNC: 54 U/L (ref 33–136)
ALT SERPL W P-5'-P-CCNC: 9 U/L (ref 7–45)
ANION GAP SERPL CALC-SCNC: 9 MMOL/L (ref 10–20)
AST SERPL W P-5'-P-CCNC: 17 U/L (ref 9–39)
BILIRUB SERPL-MCNC: 0.5 MG/DL (ref 0–1.2)
BUN SERPL-MCNC: 14 MG/DL (ref 6–23)
CALCIUM SERPL-MCNC: 8.7 MG/DL (ref 8.6–10.3)
CHLORIDE SERPL-SCNC: 105 MMOL/L (ref 98–107)
CO2 SERPL-SCNC: 29 MMOL/L (ref 21–32)
CREAT SERPL-MCNC: 0.9 MG/DL (ref 0.5–1.05)
EGFRCR SERPLBLD CKD-EPI 2021: 68 ML/MIN/1.73M*2
ERYTHROCYTE [DISTWIDTH] IN BLOOD BY AUTOMATED COUNT: 15.9 % (ref 11.5–14.5)
GLUCOSE SERPL-MCNC: 83 MG/DL (ref 74–99)
HCT VFR BLD AUTO: 28.5 % (ref 36–46)
HGB BLD-MCNC: 10 G/DL (ref 12–16)
MCH RBC QN AUTO: 27.2 PG (ref 26–34)
MCHC RBC AUTO-ENTMCNC: 35.1 G/DL (ref 32–36)
MCV RBC AUTO: 77 FL (ref 80–100)
NRBC BLD-RTO: 0 /100 WBCS (ref 0–0)
PLATELET # BLD AUTO: 198 X10*3/UL (ref 150–450)
POTASSIUM SERPL-SCNC: 3.6 MMOL/L (ref 3.5–5.3)
PROT SERPL-MCNC: 6.2 G/DL (ref 6.4–8.2)
RBC # BLD AUTO: 3.68 X10*6/UL (ref 4–5.2)
SODIUM SERPL-SCNC: 139 MMOL/L (ref 136–145)
WBC # BLD AUTO: 6.7 X10*3/UL (ref 4.4–11.3)

## 2025-06-21 PROCEDURE — 2500000001 HC RX 250 WO HCPCS SELF ADMINISTERED DRUGS (ALT 637 FOR MEDICARE OP)

## 2025-06-21 PROCEDURE — 2500000001 HC RX 250 WO HCPCS SELF ADMINISTERED DRUGS (ALT 637 FOR MEDICARE OP): Performed by: STUDENT IN AN ORGANIZED HEALTH CARE EDUCATION/TRAINING PROGRAM

## 2025-06-21 PROCEDURE — 92610 EVALUATE SWALLOWING FUNCTION: CPT | Mod: GN

## 2025-06-21 PROCEDURE — 1200000002 HC GENERAL ROOM WITH TELEMETRY DAILY

## 2025-06-21 PROCEDURE — 80053 COMPREHEN METABOLIC PANEL: CPT | Performed by: NURSE PRACTITIONER

## 2025-06-21 PROCEDURE — 85027 COMPLETE CBC AUTOMATED: CPT | Performed by: NURSE PRACTITIONER

## 2025-06-21 PROCEDURE — 2500000001 HC RX 250 WO HCPCS SELF ADMINISTERED DRUGS (ALT 637 FOR MEDICARE OP): Performed by: PHYSICIAN ASSISTANT

## 2025-06-21 PROCEDURE — 36415 COLL VENOUS BLD VENIPUNCTURE: CPT | Performed by: NURSE PRACTITIONER

## 2025-06-21 PROCEDURE — 2500000002 HC RX 250 W HCPCS SELF ADMINISTERED DRUGS (ALT 637 FOR MEDICARE OP, ALT 636 FOR OP/ED): Performed by: PHYSICIAN ASSISTANT

## 2025-06-21 PROCEDURE — 2500000004 HC RX 250 GENERAL PHARMACY W/ HCPCS (ALT 636 FOR OP/ED): Performed by: NURSE PRACTITIONER

## 2025-06-21 PROCEDURE — 99233 SBSQ HOSP IP/OBS HIGH 50: CPT | Performed by: PSYCHIATRY & NEUROLOGY

## 2025-06-21 PROCEDURE — 99232 SBSQ HOSP IP/OBS MODERATE 35: CPT | Performed by: STUDENT IN AN ORGANIZED HEALTH CARE EDUCATION/TRAINING PROGRAM

## 2025-06-21 RX ORDER — CALCIUM CARBONATE 200(500)MG
500 TABLET,CHEWABLE ORAL 4 TIMES DAILY PRN
Status: DISCONTINUED | OUTPATIENT
Start: 2025-06-21 | End: 2025-06-22 | Stop reason: HOSPADM

## 2025-06-21 RX ADMIN — POTASSIUM CHLORIDE 20 MEQ: 1500 TABLET, EXTENDED RELEASE ORAL at 09:09

## 2025-06-21 RX ADMIN — ASPIRIN 81 MG CHEWABLE TABLET 81 MG: 81 TABLET CHEWABLE at 14:54

## 2025-06-21 RX ADMIN — HYDROXYCHLOROQUINE SULFATE 200 MG: 200 TABLET, FILM COATED ORAL at 09:09

## 2025-06-21 RX ADMIN — CALCIUM CARBONATE (ANTACID) CHEW TAB 500 MG 1 TABLET: 500 CHEW TAB at 23:48

## 2025-06-21 RX ADMIN — OXYCODONE HYDROCHLORIDE AND ACETAMINOPHEN 1 TABLET: 5; 325 TABLET ORAL at 21:31

## 2025-06-21 RX ADMIN — LEVOTHYROXINE SODIUM 100 MCG: 0.1 TABLET ORAL at 06:47

## 2025-06-21 RX ADMIN — ATORVASTATIN CALCIUM 20 MG: 20 TABLET, FILM COATED ORAL at 09:09

## 2025-06-21 RX ADMIN — ENOXAPARIN SODIUM 40 MG: 100 INJECTION SUBCUTANEOUS at 20:31

## 2025-06-21 RX ADMIN — DULOXETINE 60 MG: 30 CAPSULE, DELAYED RELEASE ORAL at 09:09

## 2025-06-21 RX ADMIN — PANTOPRAZOLE SODIUM 40 MG: 40 INJECTION, POWDER, FOR SOLUTION INTRAVENOUS at 09:09

## 2025-06-21 ASSESSMENT — COGNITIVE AND FUNCTIONAL STATUS - GENERAL
DRESSING REGULAR LOWER BODY CLOTHING: A LITTLE
MOBILITY SCORE: 22
TOILETING: A LITTLE
DRESSING REGULAR LOWER BODY CLOTHING: A LITTLE
TOILETING: A LITTLE
DRESSING REGULAR UPPER BODY CLOTHING: A LITTLE
DRESSING REGULAR UPPER BODY CLOTHING: A LITTLE
CLIMB 3 TO 5 STEPS WITH RAILING: A LITTLE
DAILY ACTIVITIY SCORE: 20
WALKING IN HOSPITAL ROOM: A LITTLE
DAILY ACTIVITIY SCORE: 20
HELP NEEDED FOR BATHING: A LITTLE
WALKING IN HOSPITAL ROOM: A LITTLE
MOBILITY SCORE: 22
HELP NEEDED FOR BATHING: A LITTLE
CLIMB 3 TO 5 STEPS WITH RAILING: A LITTLE

## 2025-06-21 ASSESSMENT — PAIN - FUNCTIONAL ASSESSMENT
PAIN_FUNCTIONAL_ASSESSMENT: 0-10

## 2025-06-21 ASSESSMENT — PAIN SCALES - GENERAL
PAINLEVEL_OUTOF10: 0 - NO PAIN
PAINLEVEL_OUTOF10: 7
PAINLEVEL_OUTOF10: 2
PAINLEVEL_OUTOF10: 0 - NO PAIN

## 2025-06-21 ASSESSMENT — PAIN DESCRIPTION - DESCRIPTORS
DESCRIPTORS: ACHING
DESCRIPTORS: ACHING

## 2025-06-21 NOTE — PROGRESS NOTES
Speech-Language Pathology    SLP Adult Inpatient Speech-Language Pathology Clinical Swallow Evaluation    Patient Name: Florencia Wang  MRN: [id number]  Today's Date: 6/21/2025   Time Calculation  Start Time: 0825  Stop Time: 0837  Time Calculation (min): 12 min     Current Problem:   1. Generalized weakness  Surgical Pathology Exam    Surgical Pathology Exam      2. Esophageal dysmotility  Esophagogastroduodenoscopy (EGD)    Esophagogastroduodenoscopy (EGD) w EndoFlip    Surgical Pathology Exam    Surgical Pathology Exam        Recommendations:  Additional Recommendations:  (follow-up with GI for possible treatment following results of yesterday's EGD)  Solid Diet Recommendations : Easy to Chew (IDDSI Level 7) (moisten starches/meats)  Liquid Diet Recommendations: Thin (IDDSI Level 0)  Compensatory Swallowing Strategies: Upright 90 degrees as possible for all oral intake, Remain upright for 20-30 minutes after meals, Alternate solids and liquids, Single sips, Small bites/sips (consider smaller more frequent meals)  Medication Administration Recommendations: Whole, With Liquid    Assessment:  Assessment Results: Patient presents with mild oral dysphagia as a result of incomplete natural dentition (edentulous upper, missing bilateral lower molars), however pharyngeal phase of swallowing mechanism appears grossly intact. Known esophageal dysphagia as evidence by results of yesterday's EGD (esophageal dysmotility and medium hiatal hernia). Pt reports early satiety and 10lb unintentional weight loss due to minimal p.o. intake due to primarily solids feeling stuck at the level of lower sternal region (c/w esophageal dysphagia).     Pt seated upright in bed, with oral mech exam grossly WNL. Edentulous upper status, and missing bilateral lower natural dentition. Pt reports consuming regular solids at home, but does moisten starches/meats. Pt avoids hard food items such as nuts.     Despite missing dentition, pt demonstrated  thorough and functional mastication skills on hard solids given additional time. Full oral containment and clearance independently achieved. Pt independently elicits a sip of water prior to and following all solid p.o. trials to help minimize globus sensation at lower sternal region. Swallow onset appears prompt. No overt s/s of aspiration and vocal quality/respirations remained unchanged from baseline on all tested consistencies (even during large consecutive straw sips of thin liquid). Pt and NSG report good tolerance of swallowing whole pills with water (mixed consistency).     Prognosis: Excellent (from an oropharyngeal swallowing standpoint)  Medical Staff Made Aware: Yes  Strengths: Family/Caregiver Support, Motivation  Barriers: Comorbidities    Plan:  SLP Plan: No skilled SLP  No Skilled SLP: Independent with swallowing, At baseline function  SLP Discharge Recommendations:  (no further skilled ST intervention clinically warranted)  Discussed POC: Patient, Nursing (Jenny)  Discussed Risks/Benefits: Yes, Patient, Nursing, Caregiver/Family  Patient/Caregiver Agreeable: Yes  SLP - OK to Discharge: Yes    Subjective   Current Problem:  Clinical swallow evaluation ordered yesterday following EGD to determine diet consistency and rule out aspiration.     Upon admission, pt reports esophageal dysphagia symptoms when consuming solids. Feels solids do not go down. History of esophageal dysmotility.     SLP Visit Info:  SLP Received On: 06/21/25    General Visit Information:  Patient Class: Inpatient  Living Environment: Home  Reason for Referral: rule out aspiration; assess oropharyngeal swallow post EGD on 6/20  Past Medical History Relevant to Rehab: depression, COPD, HTN, HLD, OA, lupus, migraines, anxiety, bipolar disorder, TIA, sickle cell anemia, esophageal dysphagia (dysmotility)  Developmental Status: Age Appropriate  Prior to Session Communication: Bedside nurse (Natt)  Reviewed Procedures and Risks:  Yes  [employer] Diet: Regular (moistened) solids with thin liquids  Current Diet : Puree/Thin Liquids  Dysphagia Diagnosis: Mild oral stage dysphagia    Vital Signs: Pt passed the NSG swallow screen, and was placed on a puree diet with thin liquids.     Afebrile. 96% SPO2 on room air.     EGD completed 6/20:   Medium type II hiatal hernia  Mild erythematous mucosa in the prepyloric region, suggestive of gastritis; performed cold forceps biopsy to rule out H. pylori  The duodenum appeared normal.  Endoflip impedance planimetry showed a normal EGJ opening with EGJ-DI at 6.8 mm2/mmHg and maximum EGJ diameter at 20.3 mm. Contractility pattern was disordered contractile response (DCR)     Objective     Baseline Assessment:  Respiratory Status: Room air  Behavior/Cognition: Alert, Cooperative, Pleasant mood  Patient Positioning: Upright in Bed  Baseline Vocal Quality: Normal  Volitional Cough: Strong  Volitional Swallow: Within Functional Limits    Pain:  Pain Assessment  Pain Assessment: 0-10  0-10 (Numeric) Pain Score: 0 - No pain    Oral/Motor Assessment:  Oral Hygiene: WNL  Dentition:  (Edentulous top, missing bilateral molars on bottom)  Oral Motor: Within Functional Limits    Consistencies Trialed:  Consistencies Trialed: Yes  Consistencies Trialed: Thin (IDDSI Level 0) - Straw, Pureed/extremely thick (IDDSI Level 4), Regular (IDDSI Level 7)    Clinical Observations:  Patient Positioning: Upright in Bed  Was The 3 oz Swallow Protocol Completed: Yes    Inpatient:  Education Documentation  Results/recommendations (including diet level, compensatory swallow controls, aspiration precautions, POC) discussed with pt and NSG (Jenny); all members verbalized agreement/understanding. ST to sign-off. Please re-consult if a change in swallowing status occurs.

## 2025-06-21 NOTE — CARE PLAN
The patient's goals for the shift include  vss    The clinical goals for the shift include Remain hemodynamically stable    Over the shift, the patient did not make progress toward the following goals. Barriers to progression include n/a. Recommendations to address these barriers include n/a.

## 2025-06-21 NOTE — PROGRESS NOTES
"Florencia Wang is a 69 y.o. female on day 3 of admission presenting with Generalized weakness.    Subjective   Doing well, unsure if wants to go to snf or home states percocet is working for her headaches        Objective     Physical Exam  Constitutional:       Appearance: Normal appearance.   HENT:      Head: Normocephalic and atraumatic.      Right Ear: Tympanic membrane and ear canal normal.      Left Ear: Tympanic membrane and ear canal normal.      Mouth/Throat:      Mouth: Mucous membranes are moist.      Pharynx: Oropharynx is clear.   Eyes:      Extraocular Movements: Extraocular movements intact.      Conjunctiva/sclera: Conjunctivae normal.      Pupils: Pupils are equal, round, and reactive to light.   Cardiovascular:      Rate and Rhythm: Normal rate and regular rhythm.      Pulses: Normal pulses.      Heart sounds: Normal heart sounds.   Pulmonary:      Effort: Pulmonary effort is normal.      Breath sounds: Normal breath sounds.   Abdominal:      General: Abdomen is flat. Bowel sounds are normal.      Palpations: Abdomen is soft.   Musculoskeletal:         General: Normal range of motion.      Cervical back: Normal range of motion and neck supple.   Skin:     General: Skin is warm and dry.      Capillary Refill: Capillary refill takes 2 to 3 seconds.   Neurological:      General: No focal deficit present.      Mental Status: She is alert and oriented to person, place, and time. Mental status is at baseline.   Psychiatric:         Mood and Affect: Mood normal.         Behavior: Behavior normal.         Thought Content: Thought content normal.         Judgment: Judgment normal.         Last Recorded Vitals  Blood pressure 109/65, pulse 58, temperature 36.9 °C (98.4 °F), resp. rate 16, height 1.88 m (6' 2.02\"), weight 65.3 kg (143 lb 15.4 oz), SpO2 96%.  Intake/Output last 3 Shifts:  I/O last 3 completed shifts:  In: 480 (7.4 mL/kg) [P.O.:480]  Out: - (0 mL/kg)   Weight: 65.3 kg     Relevant " Results  Scheduled medications  Scheduled Medications[1]  Continuous medications  Continuous Medications[2]  PRN medications  PRN Medications[3]  Results for orders placed or performed during the hospital encounter of 06/18/25 (from the past 24 hours)   CBC   Result Value Ref Range    WBC 6.7 4.4 - 11.3 x10*3/uL    nRBC 0.0 0.0 - 0.0 /100 WBCs    RBC 3.68 (L) 4.00 - 5.20 x10*6/uL    Hemoglobin 10.0 (L) 12.0 - 16.0 g/dL    Hematocrit 28.5 (L) 36.0 - 46.0 %    MCV 77 (L) 80 - 100 fL    MCH 27.2 26.0 - 34.0 pg    MCHC 35.1 32.0 - 36.0 g/dL    RDW 15.9 (H) 11.5 - 14.5 %    Platelets 198 150 - 450 x10*3/uL   Comprehensive Metabolic Panel   Result Value Ref Range    Glucose 83 74 - 99 mg/dL    Sodium 139 136 - 145 mmol/L    Potassium 3.6 3.5 - 5.3 mmol/L    Chloride 105 98 - 107 mmol/L    Bicarbonate 29 21 - 32 mmol/L    Anion Gap 9 (L) 10 - 20 mmol/L    Urea Nitrogen 14 6 - 23 mg/dL    Creatinine 0.90 0.50 - 1.05 mg/dL    eGFR 68 >60 mL/min/1.73m*2    Calcium 8.7 8.6 - 10.3 mg/dL    Albumin 3.1 (L) 3.4 - 5.0 g/dL    Alkaline Phosphatase 54 33 - 136 U/L    Total Protein 6.2 (L) 6.4 - 8.2 g/dL    AST 17 9 - 39 U/L    Bilirubin, Total 0.5 0.0 - 1.2 mg/dL    ALT 9 7 - 45 U/L                 Malnutrition Diagnosis Status: New  Malnutrition Diagnosis: Severe malnutrition related to starvation  Related to: decreased appetite, inability to swallow  As Evidenced by: severe muscle wasting and severe subcutaneous fat loss, likely PO intakes meeting <=50% of EEN for >=1 month;  I agree with the dietitian's malnutrition diagnosis.      Assessment & Plan  Lower extremity weakness  Change in bowel movement  Unintentional weight loss  Dysphagia, unspecified  Vision changes  History of migraine  Elevated troponin level  Allergy to imaging contrast media  Esophageal dysmotility  -Telemetry monitoring  -metabolic work up unrevealing  - Pan positive review of systems  - Change in bowel pattern  as well as bilateral lower extremity weakness  for cauda equina/nerve compression.  Obtain CT scans of the axial spine.   -Neurology consult, appreciate input  - CT chest abdomen pelvis with IV contrast to further evaluate for possible etiologies for weight loss  - Clear liquid diet, n.p.o. after midnight  - GI consult, appreciate input may benefit from EGD +/-colonoscopy given  family history   - Elevated troponin, likely demand ischemia as patient has been having chest pain we will repeat a third troponin.    - ESR and CRP very mildly elevated, low suspicion for lupus flare  - metabolic work up unrevealing       Chronic issues:   #COPD   #Hypertension   #Hyperlipidemia   #TIA   #Arthritis   #SLE   #Hemoglobin C trait   #Osteoarthritis   #Hypothyroidism   #Migraines      Continue with patient's home medications as appropriate once entered by nursing  Supplemental oxygen at night and as needed     #Dvt ppx  Lovenox subcutaneous  SCD    6/20: CT reviewd. Lots of findings, lots nonspecific, will get EGD today to evalaute distention.  6/21:   EGD shows Concerns for esophageal dysmotility. Gastritis also notated. Need speech eval. Order placed;   Needs repeat PT eval. For dc planning.   Outpt neuro workup for migraines  DC planning next 24- 48 hrs     Plan of care discussed with attending Dr Toribio Reyna       I spent  over 30 minutes professional time in care with this patient      Harriet Go, APRN-CNP         [1] ALPRAZolam, 0.5 mg, oral, Once  aspirin, 81 mg, oral, Every Sun/Tues/Thur/Sat  atorvastatin, 20 mg, oral, Daily  DULoxetine, 60 mg, oral, Daily  enoxaparin, 40 mg, subcutaneous, q24h  hydroxychloroquine, 200 mg, oral, Daily  levothyroxine, 100 mcg, oral, Daily  pantoprazole, 40 mg, intravenous, Daily  polyethylene glycol, 17 g, oral, Daily  potassium chloride CR, 20 mEq, oral, Daily  predniSONE, 10 mg, oral, Every other day  [2]    [3] PRN medications: acetaminophen, fluticasone, ipratropium-albuteroL, nitroglycerin, ondansetron,  oxyCODONE-acetaminophen, oxygen, zolpidem

## 2025-06-21 NOTE — CARE PLAN
The patient's goals for the shift include      The clinical goals for the shift include Remain HDS      Problem: Pain - Adult  Goal: Verbalizes/displays adequate comfort level or baseline comfort level  Outcome: Progressing     Problem: Safety - Adult  Goal: Free from fall injury  Outcome: Progressing     Problem: Discharge Planning  Goal: Discharge to home or other facility with appropriate resources  Outcome: Progressing     Problem: Chronic Conditions and Co-morbidities  Goal: Patient's chronic conditions and co-morbidity symptoms are monitored and maintained or improved  Outcome: Progressing     Problem: Nutrition  Goal: Nutrient intake appropriate for maintaining nutritional needs  Outcome: Progressing     Problem: Skin  Goal: Decreased wound size/increased tissue granulation at next dressing change  Outcome: Progressing  Goal: Participates in plan/prevention/treatment measures  Outcome: Progressing  Goal: Prevent/manage excess moisture  Outcome: Progressing  Goal: Prevent/minimize sheer/friction injuries  Outcome: Progressing  Goal: Promote/optimize nutrition  Outcome: Progressing  Goal: Promote skin healing  Outcome: Progressing

## 2025-06-21 NOTE — PROGRESS NOTES
"Florencia Wang is a 69 y.o. female on day 3 of admission presenting with Generalized weakness.      Subjective   The patient is a 69-year-old female who is well-known to me.  The patient has multiple medical issues.  The patient presents with worsening lower extremity weakness, worsening headache and difficulties with swallowing.  The patient states that her headaches are significantly improved.  She also feels that her swallowing is slightly improved as well.  She denies any new neurological problems.       Objective     Last Recorded Vitals  Blood pressure 101/58, pulse 67, temperature 36.3 °C (97.3 °F), resp. rate 18, height 1.88 m (6' 2.02\"), weight 65.3 kg (143 lb 15.4 oz), SpO2 95%.    Physical Exam  Neurological Exam    The patient's mental status testing shows that the patient is alert and oriented ×3 with no evidence of any aphasia or dysarthria.  The patient's cranial nerve testing 2, 3, 4, 5, 6, and 7 are all within normal limits.  The patient's motor testing shows normal tone, bulk and power in the upper and lower extremities bilaterally.      Relevant Results    Scheduled medications  Scheduled Medications[1]  Continuous medications  Continuous Medications[2]  PRN medications  PRN Medications[3]    Results for orders placed or performed during the hospital encounter of 06/18/25 (from the past 96 hours)   CBC and Auto Differential   Result Value Ref Range    WBC 5.5 4.4 - 11.3 x10*3/uL    nRBC 0.0 0.0 - 0.0 /100 WBCs    RBC 4.46 4.00 - 5.20 x10*6/uL    Hemoglobin 12.2 12.0 - 16.0 g/dL    Hematocrit 34.9 (L) 36.0 - 46.0 %    MCV 78 (L) 80 - 100 fL    MCH 27.4 26.0 - 34.0 pg    MCHC 35.0 32.0 - 36.0 g/dL    RDW 15.9 (H) 11.5 - 14.5 %    Platelets 202 150 - 450 x10*3/uL    Neutrophils % 47.0 40.0 - 80.0 %    Immature Granulocytes %, Automated 0.4 0.0 - 0.9 %    Lymphocytes % 42.8 13.0 - 44.0 %    Monocytes % 8.7 2.0 - 10.0 %    Eosinophils % 0.9 0.0 - 6.0 %    Basophils % 0.2 0.0 - 2.0 %    Neutrophils " Absolute 2.58 1.60 - 5.50 x10*3/uL    Immature Granulocytes Absolute, Automated 0.02 0.00 - 0.50 x10*3/uL    Lymphocytes Absolute 2.35 0.80 - 3.00 x10*3/uL    Monocytes Absolute 0.48 0.05 - 0.80 x10*3/uL    Eosinophils Absolute 0.05 0.00 - 0.40 x10*3/uL    Basophils Absolute 0.01 0.00 - 0.10 x10*3/uL   Comprehensive Metabolic Panel   Result Value Ref Range    Glucose 102 (H) 74 - 99 mg/dL    Sodium 141 136 - 145 mmol/L    Potassium 3.9 3.5 - 5.3 mmol/L    Chloride 105 98 - 107 mmol/L    Bicarbonate 29 21 - 32 mmol/L    Anion Gap 11 10 - 20 mmol/L    Urea Nitrogen 16 6 - 23 mg/dL    Creatinine 0.90 0.50 - 1.05 mg/dL    eGFR 68 >60 mL/min/1.73m*2    Calcium 9.6 8.6 - 10.3 mg/dL    Albumin 4.0 3.4 - 5.0 g/dL    Alkaline Phosphatase 73 33 - 136 U/L    Total Protein 7.9 6.4 - 8.2 g/dL    AST 19 9 - 39 U/L    Bilirubin, Total 0.6 0.0 - 1.2 mg/dL    ALT 9 7 - 45 U/L   Troponin I, High Sensitivity   Result Value Ref Range    Troponin I, High Sensitivity 47 (H) 0 - 13 ng/L   Influenza A, and B PCR   Result Value Ref Range    Flu A Result Not Detected Not Detected    Flu B Result Not Detected Not Detected   Sars-CoV-2 PCR   Result Value Ref Range    Coronavirus 2019, PCR Not Detected Not Detected   TSH with reflex to Free T4 if abnormal   Result Value Ref Range    Thyroid Stimulating Hormone 21.72 (H) 0.44 - 3.98 mIU/L   Thyroxine, Free   Result Value Ref Range    Thyroxine, Free 0.64 0.61 - 1.12 ng/dL   Magnesium   Result Value Ref Range    Magnesium 2.13 1.60 - 2.40 mg/dL   Reticulocytes   Result Value Ref Range    Retic % 0.8 0.5 - 2.0 %    Retic Absolute 0.036 0.017 - 0.110 x10*6/uL    Reticulocyte Hemoglobin 27 (L) 28 - 38 pg    Immature Retic fraction 7.8 <=16.0 %   Sedimentation rate, automated   Result Value Ref Range    Sedimentation Rate 34 (H) 0 - 30 mm/h   ECG 12 Lead   Result Value Ref Range    Ventricular Rate 79 BPM    Atrial Rate 79 BPM    NH Interval 190 ms    QRS Duration 93 ms    QT Interval 344 ms    QTC  Calculation(Bazett) 395 ms    P Axis 69 degrees    R Axis 264 degrees    T Axis 47 degrees    QRS Count 13 beats    Q Onset 249 ms    T Offset 421 ms    QTC Fredericia 377 ms   Urine Culture    Specimen: Clean Catch/Voided; Urine   Result Value Ref Range    Urine Culture       Growth indicates contamination with periurethral karolina. Repeat culture if clinically indicated.   Urinalysis with Reflex Microscopic   Result Value Ref Range    Color, Urine Yellow Light-Yellow, Yellow, Dark-Yellow    Appearance, Urine Clear Clear    Specific Gravity, Urine 1.021 1.005 - 1.035    pH, Urine 6.0 5.0, 5.5, 6.0, 6.5, 7.0, 7.5, 8.0    Protein, Urine 20 (TRACE) NEGATIVE, 10 (TRACE), 20 (TRACE) mg/dL    Glucose, Urine Normal Normal mg/dL    Blood, Urine NEGATIVE NEGATIVE mg/dL    Ketones, Urine NEGATIVE NEGATIVE mg/dL    Bilirubin, Urine NEGATIVE NEGATIVE mg/dL    Urobilinogen, Urine 2 (1+) (A) Normal mg/dL    Nitrite, Urine NEGATIVE NEGATIVE    Leukocyte Esterase, Urine NEGATIVE NEGATIVE   Microscopic Only, Urine   Result Value Ref Range    WBC, Urine 1-5 1-5, NONE /HPF    RBC, Urine 1-2 NONE, 1-2, 3-5 /HPF    Squamous Epithelial Cells, Urine 1-9 (SPARSE) Reference range not established. /HPF    Mucus, Urine 1+ Reference range not established. /LPF    Hyaline Casts, Urine 3+ (A) NONE /LPF    Amorphous Crystals, Urine 1+ NONE, 1+, 2+ /HPF   Troponin I, High Sensitivity   Result Value Ref Range    Troponin I, High Sensitivity 52 (HH) 0 - 13 ng/L   C-reactive protein   Result Value Ref Range    C-Reactive Protein 1.25 (H) <1.00 mg/dL   Troponin I, High Sensitivity   Result Value Ref Range    Troponin I, High Sensitivity 67 (HH) 0 - 13 ng/L   CBC   Result Value Ref Range    WBC 3.3 (L) 4.4 - 11.3 x10*3/uL    nRBC 0.0 0.0 - 0.0 /100 WBCs    RBC 4.30 4.00 - 5.20 x10*6/uL    Hemoglobin 11.4 (L) 12.0 - 16.0 g/dL    Hematocrit 34.1 (L) 36.0 - 46.0 %    MCV 79 (L) 80 - 100 fL    MCH 26.5 26.0 - 34.0 pg    MCHC 33.4 32.0 - 36.0 g/dL    RDW  15.9 (H) 11.5 - 14.5 %    Platelets 204 150 - 450 x10*3/uL   Basic metabolic panel   Result Value Ref Range    Glucose 145 (H) 74 - 99 mg/dL    Sodium 141 136 - 145 mmol/L    Potassium 4.7 3.5 - 5.3 mmol/L    Chloride 104 98 - 107 mmol/L    Bicarbonate 29 21 - 32 mmol/L    Anion Gap 13 10 - 20 mmol/L    Urea Nitrogen 13 6 - 23 mg/dL    Creatinine 0.89 0.50 - 1.05 mg/dL    eGFR 69 >60 mL/min/1.73m*2    Calcium 9.6 8.6 - 10.3 mg/dL   Troponin I, High Sensitivity   Result Value Ref Range    Troponin I, High Sensitivity 28 (H) 0 - 13 ng/L   Valproic acid level, total   Result Value Ref Range    Valproic Acid 27 (L) 50 - 100 ug/mL   POCT GLUCOSE   Result Value Ref Range    POCT Glucose 176 (H) 74 - 99 mg/dL   SST TOP   Result Value Ref Range    Extra Tube Hold for add-ons.    Lavender Top   Result Value Ref Range    Extra Tube Hold for add-ons.    CBC   Result Value Ref Range    WBC 7.4 4.4 - 11.3 x10*3/uL    nRBC 0.0 0.0 - 0.0 /100 WBCs    RBC 3.86 (L) 4.00 - 5.20 x10*6/uL    Hemoglobin 10.5 (L) 12.0 - 16.0 g/dL    Hematocrit 30.2 (L) 36.0 - 46.0 %    MCV 78 (L) 80 - 100 fL    MCH 27.2 26.0 - 34.0 pg    MCHC 34.8 32.0 - 36.0 g/dL    RDW 16.0 (H) 11.5 - 14.5 %    Platelets 226 150 - 450 x10*3/uL   Basic Metabolic Panel   Result Value Ref Range    Glucose 105 (H) 74 - 99 mg/dL    Sodium 141 136 - 145 mmol/L    Potassium 4.1 3.5 - 5.3 mmol/L    Chloride 108 (H) 98 - 107 mmol/L    Bicarbonate 27 21 - 32 mmol/L    Anion Gap 10 10 - 20 mmol/L    Urea Nitrogen 13 6 - 23 mg/dL    Creatinine 0.82 0.50 - 1.05 mg/dL    eGFR 77 >60 mL/min/1.73m*2    Calcium 9.0 8.6 - 10.3 mg/dL   CBC   Result Value Ref Range    WBC 6.7 4.4 - 11.3 x10*3/uL    nRBC 0.0 0.0 - 0.0 /100 WBCs    RBC 3.68 (L) 4.00 - 5.20 x10*6/uL    Hemoglobin 10.0 (L) 12.0 - 16.0 g/dL    Hematocrit 28.5 (L) 36.0 - 46.0 %    MCV 77 (L) 80 - 100 fL    MCH 27.2 26.0 - 34.0 pg    MCHC 35.1 32.0 - 36.0 g/dL    RDW 15.9 (H) 11.5 - 14.5 %    Platelets 198 150 - 450 x10*3/uL    Comprehensive Metabolic Panel   Result Value Ref Range    Glucose 83 74 - 99 mg/dL    Sodium 139 136 - 145 mmol/L    Potassium 3.6 3.5 - 5.3 mmol/L    Chloride 105 98 - 107 mmol/L    Bicarbonate 29 21 - 32 mmol/L    Anion Gap 9 (L) 10 - 20 mmol/L    Urea Nitrogen 14 6 - 23 mg/dL    Creatinine 0.90 0.50 - 1.05 mg/dL    eGFR 68 >60 mL/min/1.73m*2    Calcium 8.7 8.6 - 10.3 mg/dL    Albumin 3.1 (L) 3.4 - 5.0 g/dL    Alkaline Phosphatase 54 33 - 136 U/L    Total Protein 6.2 (L) 6.4 - 8.2 g/dL    AST 17 9 - 39 U/L    Bilirubin, Total 0.5 0.0 - 1.2 mg/dL    ALT 9 7 - 45 U/L         Esophagogastroduodenoscopy (EGD) w EndoFlip  Result Date: 6/20/2025  Table formatting from the original result was not included. Impression Medium type II hiatal hernia Mild erythematous mucosa in the prepyloric region, suggestive of gastritis; performed cold forceps biopsy to rule out H. pylori The duodenum appeared normal. Endoflip impedance planimetry showed a normal EGJ opening with EGJ-DI at 6.8 mm2/mmHg and maximum EGJ diameter at 20.3 mm. Contractility pattern was disordered contractile response (DCR). Findings The Endoflip impedance planimetry system was utilized to assess distensibility and secondary peristalsis. Results Below: Placement time: 1452 Removal time: 1456 Lower esophageal sphincter measurement (cm): 40 Balloon Volume (mL): 50 Diameter (mm): 18 Distensibility (mm2/mmHg): 10.9 Pressure (mm/Hg): 23.2 Balloon Volume (mL): 60 Diameter (mm): 20.3 Distensibility (mm2/mmHg): 6.8 Pressure (mm/Hg): 47.4 Balloon Volume (mL): 70 Diameter (mm): 20.3 Distensibility (mm2/mmHg): 4.5 Pressure (mm/Hg): 71.7 Medium herniation of gastric fundus into thoracic cavity (type II hiatal hernia). Hill grade IV hiatal hernia Mild, localized erythematous mucosa in the prepyloric region, suggestive of gastritis; performed cold forceps biopsy to rule out H. pylori The duodenum appeared normal. Recommendation Await pathology results Indication Esophageal  dysmotility Post-Op Diagnosis None Staff Staff Role Devin Tran MD Proceduralist Medications See Anesthesia Record. Preprocedure A history and physical has been performed, and patient medication allergies have been reviewed. The patient's tolerance of previous anesthesia has been reviewed. The risks and benefits of the procedure and the sedation options and risks were discussed with the patient. All questions were answered and informed consent obtained. Details of the Procedure The patient underwent monitored anesthesia care, which was administered by an anesthesia professional. The patient's blood pressure, ECG, ETCO2, heart rate, level of consciousness, oxygen and respirations were monitored throughout the procedure. The scope was introduced through the mouth and advanced to the second part of the duodenum. Retroflexion was performed in the cardia. The patient experienced no blood loss. The procedure was not difficult. The patient tolerated the procedure well. There were no apparent adverse events. Events Procedure Events Event Event Time ENDO SCOPE IN TIME 6/20/2025  2:44 PM ENDO SCOPE OUT TIME 6/20/2025  2:51 PM Specimens ID Type Source Tests Collected by Time 1 : gastric antrum bx's, cold bx Tissue STOMACH ANTRUM BIOPSY SURGICAL PATHOLOGY EXAM Devin Tran MD 6/20/2025 1450 Procedure Location [facility] [id number] [facility] [place], [place] [address] [phone] Referring Provider ALONDRA Roque-CNP Procedure Provider Devin Tran MD     CT chest abdomen pelvis w IV contrast  Result Date: 6/19/2025  Interpreted By:  Von Bender, STUDY: CT CHEST ABDOMEN PELVIS W IV CONTRAST;  6/19/2025 1:55 pm   INDICATION: Signs/Symptoms:unintentional weight loss, difficulty with defecation, dysphagia, lower ext weakness, family hx cancer. Hx smoking.   COMPARISON: CT AP 12/21/2022, CT chest 01/17/2021   ACCESSION NUMBER(S): [id number]   ORDERING CLINICIAN: STEPHANIE DAVIS   TECHNIQUE: CT  of the chest, abdomen, and pelvis was performed.  Contiguous axial images were obtained at 3 mm slice thickness through the chest, abdomen and pelvis. Coronal and sagittal reconstructions at 3 mm slice thickness were performed. 75 ml of Omnipaque 350 were administered intravenously without immediate complication.   FINDINGS: CHEST:   LUNGS/PLEURA/LARGE AIRWAYS: Mild diffuse bronchial wall thickening. Mild patchy bilateral lower lobe and lung base infiltrates/atelectasis right greater than left. Mild focal nodular thickening along the right major fissure superiorly up to 5 mm thickness image 102. Some posterior pleural-based nodularity/nodular foci of pleural thickening for example up to 9 x 3 mm on the left image 140. Some areas of scarring/atelectasis bilaterally. No pleural effusions.   MEDIASTINUM AND TERRENCE: No adenopathy by size criteria.   ESOPHAGUS: Mild gaseous distention of the mid esophagus of uncertain significance but grossly similar to prior. Small hiatal hernia.   HEART: Stable size. Trace pericardial effusion. Coronary arteries show atherosclerotic calcifications but otherwise suboptimally evaluated.   VASCULAR: Thoracic aorta is mildly aneurysmal at the ascending segment up to 4.1 cm caliber and mild diffuse prominence descending segment up to 3 cm caliber. Mildly tortuous descending segment. Moderate vascular calcifications.. Limited evaluation for pulmonary emboli on nondedicated exam.   CHEST WALL AND LOWER NECK: No definite new suspicious axillary adenopathy. Opacification of some vascular collaterals along the right chest wall and subscapular region following right upper extremity intravenous injection of uncertain significance.   BONES: Bones appear demineralized. Degenerative changes visualized spine.     ABDOMEN:   LIVER: No definite suspicious hepatic lesion. Suspected fatty liver.   BILE DUCTS: No ductal dilatation.   GALLBLADDER: Mildly distended gallbladder without definite opaque stones.    SPLEEN: Within normal limits.   PANCREAS: Within normal limits.   ADRENAL GLANDS: Within normal limits.   KIDNEYS AND URETERS: No definite renal stones or hydronephrosis. A few small hypoattenuating foci posterior upper pole left kidney too small to characterize. Approximate 1.6 cm focal hypoattenuation mid upper pole right kidney probably representing prominent medullary component with underlying lesion less likely. Mild cortical regularity posterolaterally probably due to scarring. Mild lobulated appearance of the kidneys. Ureters are not well visualized. No definite evidence for significant hydroureter.   BOWEL: Multiple prominent mildly distended gas-filled small bowel loops in the left upper quadrant and some mildly distended gas-filled and fluid-filled small bowel loops elsewhere scattered throughout up to 3.7 cm in the left pelvis image 144. There are also some relatively collapsed small bowel loops in the left abdomen and pelvis overall of uncertain significance. Prominent colonic stool throughout. Tortuous and redundant colon throughout. Suggestion of some mild wall thickening involving colonic loops within the pelvis. There is some foci of relatively peripheral oriented gas within colonic loops of within the pelvis felt most likely relating to to represent peripherally displaced intraluminal gas related to prominent stool rather than pneumatosis. Appendix is not definitely seen. Wall thickening versus incomplete distention of the stomach and portions of duodenum.   PERITONEUM/RETROPERITONEUM/LYMPH NODES: No free air.  No free fluid or focal collection. Mildly prominent nonspecific mesenteric nodes scattered throughout. No pathologic adenopathy.   VASCULAR: Abdominal aorta is moderately atherosclerotic without aneurysm. Approximate 8 mm rounded calcific density apparently within the IVC near the junction with the right renal vein image 107, suggestive of calcified chronic thrombosis and was present  previously and similar in appearance. Heterogeneous relative hypoattenuation of the iliac and femoral veins probably due to admixture of opacified and unopacified blood and timing of imaging rather than sequela of venous thrombosis.   PELVIS:   BLADDER: Mild nonspecific urinary bladder wall thickening.   REPRODUCTIVE ORGANS: Suboptimally evaluated by CT but grossly stable in appearance.   ABDOMINAL WALL: Small to moderate fat containing mid to lower anterior abdominal wall/periumbilical hernia overall slightly decreased compared to prior. Some foci of gaseous lucency in the left lower anterior abdominal wall subcutaneous fat may relate to subcutaneous medicine injection.   BONES: Bones appear demineralized. Minimal anterolisthesis L4 on L5 and mild retrolisthesis L5 on S1. Minimal anterolisthesis L3 on L4. Marked arthritic changes of the hip joints. Arthritic changes about the SI joints. Degenerative changes visualized spine. Heterogeneous and somewhat serpiginous sclerotic foci at the femoral heads bilaterally particular on the left can be seen in the setting of avascular necrosis.   ADDITIONAL FINDINGS: None significant.       CHEST: 1.  Mild focal nodular thickening along the right major fissure superiorly as described as well as some posterior pleural-based nodularity/nodular foci of pleural thickening bilaterally. Consider follow-up chest CT in 3-6 months to evaluate stability. 2. Mild patchy bilateral lower lobe and lung base infiltrates/atelectasis right greater than left. 3. Mild gaseous distention at the mid esophagus of uncertain significance but similar to prior. 4. Mild aneurysmal dilatation thoracic aorta up to 4.1 cm at the ascending segment. 5. Opacification of some vascular collaterals along the right chest wall and subscapular region following right upper extremity intravenous injection of uncertain significance. Correlation with duplex sonography of the upper extremity may be considered for further  assessment as clinically warranted.   ABDOMEN-PELVIS: 1.  Multiple prominent gas-filled bowel loops in the left upper quadrant which were not present previously and some mildly distended gas-filled and fluid-filled small bowel loops elsewhere scattered throughout along with some relative collapsed small bowel loops in the left abdomen and pelvis, raising concern for the possibility of at least partial or developing obstruction. Considering somewhat grouped appearance of small bowel loops in the left upper abdomen, the possibility of an internal hernia cannot be excluded. However no definite associated suspicious wall thickening or regional stranding. There is also suggestion of some mild wall thickening involving colonic loops within the pelvis with prominent colonic stool raising concern for mild colitis including infectious/inflammatory or stercoral colitis with underlying ischemic component less likely. Foci of relative peripheral oriented gas involving these colonic loops felt most likely to represent peripherally displaced intraluminal gas rather than foci of pneumatosis. Fairly diffusely tortuous and redundant colon. Close clinical correlation and follow-up advised. 2. Mildly distended gallbladder without definite opaque stones. 3. Approximate 1.6 cm focal hypoattenuation mid upper pole right kidney probably representing prominent medullary component with underlying lesion less likely. Mild cortical regularity posterolaterally probably due to scarring. A few small hypoattenuating foci posterior upper left kidney too small to characterize. Consider ultrasound for further assessment. 4. Wall thickening versus incomplete distention of the stomach and portions of the duodenum could reflect gastritis/duodenitis.. 5. Approximate 8 mm rounded calcific density within the IVC suggestive chronic calcified thrombosis which is unchanged since prior. Heterogeneous relative hypoattenuation of iliac and femoral veins  probably due to phase of contrast opacification rather than sequela venous thrombosis. Consider duplex sonography for further assessment as clinically warranted. 6. Nonspecific urinary bladder wall thickening. 7. Small to moderate fat containing mid to lower anterior abdominal wall/periumbilical hernia slightly decreased compared to prior. Some foci of gaseous lucency in the left lower anterior abdominal wall subcutaneous fat may relate to subcutaneous medicine injection. 8. Marked arthritic changes of the hip joints including with heterogeneous somewhat serpiginous sclerotic foci at the femoral heads particular on the left which can be seen in the setting of avascular necrosis. 9. Additional findings as above.     MACRO: Critical Finding:  See findings. Notification was initiated on 6/19/2025 at 3:46 pm by  Von Bender.  (**-OCF-**) Instructions:   Signed by: Von Bender 6/19/2025 3:50 PM Dictation workstation:   [id number]    CT lumbar spine retrospective reconstruction protocol  Result Date: 6/19/2025  Interpreted By:  Jayla Hua, STUDY: CT LUMBAR SPINE RETROSPECTIVE RECONSTRUCTION PROTOCOL;  6/19/2025 1:55 pm   INDICATION: Signs/Symptoms:unintentional weight loss, difficulty with defecation, dysphagia, lower ext weakness, family hx cancer. Hx smoking..     COMPARISON: None.   ACCESSION NUMBER(S): [id number]   ORDERING CLINICIAN: STEPHANIE DAVIS   TECHNIQUE: Axial images through the lumbar spine. Sagittal and coronal reconstructions were performed.   FINDINGS: For counting purposes the last lumbarized vertebral body is labeled L5.   There is grade 1 anterolisthesis of L3 on L4 and L4 on L5. Alignment and vertebral body heights are otherwise maintained.   No CT evidence of a focal osseous lesion.   Mild disc height loss at L4-L5 and L5-S1.   Please see CT chest abdomen and pelvis for evaluation of the soft tissues.   Evaluation by level:   T12-L1 no spinal canal or neural foraminal stenosis.   L1-L2: Mild  disc bulge and facet arthrosis. No spinal canal or neural foraminal stenosis.   L2-L3: Mild disc bulge and facet arthrosis. Mild spinal canal stenosis. Mild neural foraminal stenosis   L3-L4: Disc uncovering, disc bulge and facet arthrosis. Mild-to-moderate spinal canal stenosis. Moderate neural foraminal stenosis.   L4-L5: Disc uncovering, disc bulge and facet arthrosis. Moderate spinal canal stenosis. Moderate neural foraminal stenosis.   L5-S1: Disc bulge and facet arthrosis. No spinal canal stenosis. Moderate neural foraminal stenosis.       No CT evidence of a focal osseous lesion.   Degenerative changes as detailed above.   MACRO: None   Signed by: Jayla Hua 6/19/2025 2:50 PM Dictation workstation:   [id number]    CT thoracic spine retrospective reconstruction protocol  Result Date: 6/19/2025  Interpreted By:  Jayla Hua, STUDY: CT THORACIC SPINE RETROSPECTIVE RECONSTRUCTION PROTOCOL;  6/19/2025 1:55 pm   INDICATION: Signs/Symptoms:unintentional weight loss, difficulty with defecation, dysphagia, lower ext weakness, family hx cancer. Hx smoking.     COMPARISON: None.   ACCESSION NUMBER(S): [id number]   ORDERING CLINICIAN: STEPHANIE DAVIS   TECHNIQUE: Axial images through the thoracic spine. Sagittal and coronal reconstructions were performed.   FINDINGS: For counting purposes the 1st rib-bearing vertebral body is labeled T1.   There is exaggerated thoracic kyphosis. Alignment and vertebral body heights are maintained.   Sclerotic foci within the C7 spinous process, T3 transverse process on the left and T5 vertebral body likely represent bone islands.   No CT evidence of an additional focal osseous lesion.   There is no evidence of high-grade spinal canal or neural foraminal stenosis.   Please see CT chest abdomen and pelvis for evaluation of the soft tissues. Prevertebral soft tissues are not thickened.       Sclerotic foci within the C7 spinous process, T3 transverse process on the left and T5 vertebral  body likely represent bone islands. These were present on the prior exam 03/31/2020.   No CT evidence of an additional focal osseous lesion.   MACRO: None   Signed by: Jayla Hua 6/19/2025 2:46 PM Dictation workstation:   [id number]    CT cervical spine wo IV contrast  Result Date: 6/19/2025  Interpreted By:  Jayla Hua, STUDY: CT CERVICAL SPINE WO IV CONTRAST;  6/19/2025 1:55 pm   INDICATION: Signs/Symptoms:unintentional weight loss, difficulty with defecation, dysphagia, lower ext weakness, family hx cancer. Hx smoking.     COMPARISON: None.   ACCESSION NUMBER(S): [id number]   ORDERING CLINICIAN: STEPHANIE DAVIS   TECHNIQUE: Axial CT images of the cervical spine are obtained. Axial, coronal and sagittal reconstructions are provided for review.   FINDINGS: Craniocervical junction is within normal limits. Atlantodental interval within normal limits.   There is trace anterolisthesis of C7 on T1. Alignment is otherwise maintained.   Vertebral body heights are maintained.   Small lucent lesion measuring 0.6 cm within the anterior C5 vertebral body is unchanged compared to the prior CT 02/06/2019.   There is a sclerotic focus within the T3 transverse process on the left which likely represents a bone island, unchanged.   No CT evidence of an additional focal osseous lesion.   Mild disc height loss at C2-C3 and C3-C4. Prevertebral soft tissues are not thickened.   Please see CT chest performed the same day for lung findings.   Evaluation by level:   C1-C2: No spinal canal stenosis.   C2-C3: Disc osteophyte complex, uncovertebral joint hypertrophy and facet arthrosis. No spinal canal stenosis. Mild neural foraminal stenosis.   C3-C4: Disc osteophyte complex, uncovertebral joint hypertrophy and facet arthrosis. Mild spinal canal stenosis. Moderate to severe left and moderate right neural foraminal stenosis.   C4-C5: Disc osteophyte complex, uncovertebral joint hypertrophy and facet arthrosis. No spinal canal stenosis.  Mild neural foraminal stenosis.   C5-C6: Left eccentric disc osteophyte complex, uncovertebral joint hypertrophy and facet arthrosis. Mild spinal canal stenosis. Moderate left and mild right neural foraminal stenosis.   C6-C7: No spinal canal or neural foraminal stenosis.   C7-T1: No spinal canal or neural foraminal stenosis.       Unchanged subcentimeter lucent lesion within the anterior C5 vertebral body may represent a hemangioma.   Unchanged sclerotic focus within the T3 transverse process on the left and within the C7 spinous process which may represent a bone islands.   No CT evidence of an additional focal lesion.   Degenerative changes as detailed above without high-grade spinal canal stenosis. Varying degrees of neural foraminal narrowing as detailed above.   MACRO: None   Signed by: Jayla Hua 6/19/2025 2:34 PM Dictation workstation:   [id number]             This patient currently has cardiac telemetry ordered; if you would like to modify or discontinue the telemetry order, click here to go to the orders activity to modify/discontinue the order.  Assessment & Plan  Generalized weakness    History of migraine    Dysphagia, unspecified    Esophageal dysmotility        Plan: The patient is improved in terms of her neurological complaints.  No further neurological workup is necessary at this time as the patient develops new neurological signs or symptoms.  The patient should continue all of her medicines and stroke risk factor modification.  The patient should certainly follow-up with GI and should consider an ENT consult as an outpatient.  I did review the note from GI and also her upper endoscopy results.  Will start the patient on Emgality as an outpatient.  The patient can continue symptomatic treatment as needed for severe headaches.  I discussed all these issues in detail with the patient and answered all of her questions.  I will sign off.  Thank will follow-up with me in the office within 4  months.  Marco A Paulson MD       [1] ALPRAZolam, 0.5 mg, oral, Once  aspirin, 81 mg, oral, Every Sun/Tues/Thur/Sat  atorvastatin, 20 mg, oral, Daily  DULoxetine, 60 mg, oral, Daily  enoxaparin, 40 mg, subcutaneous, q24h  hydroxychloroquine, 200 mg, oral, Daily  levothyroxine, 100 mcg, oral, Daily  pantoprazole, 40 mg, intravenous, Daily  polyethylene glycol, 17 g, oral, Daily  potassium chloride CR, 20 mEq, oral, Daily  predniSONE, 10 mg, oral, Every other day  [2]    [3] PRN medications: acetaminophen, fluticasone, ipratropium-albuteroL, nitroglycerin, ondansetron, oxyCODONE-acetaminophen, oxygen, zolpidem

## 2025-06-22 ENCOUNTER — HOME HEALTH ADMISSION (OUTPATIENT)
Dept: HOME HEALTH SERVICES | Facility: HOME HEALTH | Age: 70
End: 2025-06-22
Payer: MEDICARE

## 2025-06-22 ENCOUNTER — DOCUMENTATION (OUTPATIENT)
Dept: HOME HEALTH SERVICES | Facility: HOME HEALTH | Age: 70
End: 2025-06-22
Payer: MEDICARE

## 2025-06-22 VITALS
WEIGHT: 143.96 LBS | HEIGHT: 72 IN | TEMPERATURE: 97.7 F | SYSTOLIC BLOOD PRESSURE: 103 MMHG | HEART RATE: 64 BPM | BODY MASS INDEX: 19.5 KG/M2 | DIASTOLIC BLOOD PRESSURE: 67 MMHG | OXYGEN SATURATION: 93 % | RESPIRATION RATE: 18 BRPM

## 2025-06-22 PROBLEM — R79.89 ELEVATED TROPONIN LEVEL: Status: RESOLVED | Noted: 2024-03-28 | Resolved: 2025-06-22

## 2025-06-22 LAB
ACHR BIND AB SER-SCNC: 0.2 NMOL/L (ref 0–0.4)
ACHR MOD AB/ACHR TOTAL SFR SER: 0 %
ALBUMIN SERPL BCP-MCNC: 3.2 G/DL (ref 3.4–5)
ALP SERPL-CCNC: 56 U/L (ref 33–136)
ALT SERPL W P-5'-P-CCNC: 11 U/L (ref 7–45)
ANION GAP SERPL CALC-SCNC: 9 MMOL/L (ref 10–20)
AST SERPL W P-5'-P-CCNC: 19 U/L (ref 9–39)
BILIRUB SERPL-MCNC: 0.5 MG/DL (ref 0–1.2)
BUN SERPL-MCNC: 10 MG/DL (ref 6–23)
CALCIUM SERPL-MCNC: 8.7 MG/DL (ref 8.6–10.3)
CHLORIDE SERPL-SCNC: 105 MMOL/L (ref 98–107)
CO2 SERPL-SCNC: 29 MMOL/L (ref 21–32)
CREAT SERPL-MCNC: 0.84 MG/DL (ref 0.5–1.05)
EGFRCR SERPLBLD CKD-EPI 2021: 74 ML/MIN/1.73M*2
ERYTHROCYTE [DISTWIDTH] IN BLOOD BY AUTOMATED COUNT: 16 % (ref 11.5–14.5)
GLUCOSE SERPL-MCNC: 78 MG/DL (ref 74–99)
HCT VFR BLD AUTO: 30.6 % (ref 36–46)
HGB BLD-MCNC: 10.8 G/DL (ref 12–16)
MCH RBC QN AUTO: 27.7 PG (ref 26–34)
MCHC RBC AUTO-ENTMCNC: 35.3 G/DL (ref 32–36)
MCV RBC AUTO: 79 FL (ref 80–100)
NRBC BLD-RTO: 0 /100 WBCS (ref 0–0)
PLATELET # BLD AUTO: 205 X10*3/UL (ref 150–450)
POTASSIUM SERPL-SCNC: 3.8 MMOL/L (ref 3.5–5.3)
PROT SERPL-MCNC: 6.3 G/DL (ref 6.4–8.2)
RBC # BLD AUTO: 3.9 X10*6/UL (ref 4–5.2)
SODIUM SERPL-SCNC: 139 MMOL/L (ref 136–145)
WBC # BLD AUTO: 6.5 X10*3/UL (ref 4.4–11.3)

## 2025-06-22 PROCEDURE — 2500000004 HC RX 250 GENERAL PHARMACY W/ HCPCS (ALT 636 FOR OP/ED): Performed by: PHYSICIAN ASSISTANT

## 2025-06-22 PROCEDURE — 84075 ASSAY ALKALINE PHOSPHATASE: CPT | Performed by: NURSE PRACTITIONER

## 2025-06-22 PROCEDURE — 2500000004 HC RX 250 GENERAL PHARMACY W/ HCPCS (ALT 636 FOR OP/ED): Performed by: NURSE PRACTITIONER

## 2025-06-22 PROCEDURE — 2500000002 HC RX 250 W HCPCS SELF ADMINISTERED DRUGS (ALT 637 FOR MEDICARE OP, ALT 636 FOR OP/ED): Performed by: PHYSICIAN ASSISTANT

## 2025-06-22 PROCEDURE — 85027 COMPLETE CBC AUTOMATED: CPT | Performed by: NURSE PRACTITIONER

## 2025-06-22 PROCEDURE — 99238 HOSP IP/OBS DSCHRG MGMT 30/<: CPT | Performed by: STUDENT IN AN ORGANIZED HEALTH CARE EDUCATION/TRAINING PROGRAM

## 2025-06-22 PROCEDURE — 36415 COLL VENOUS BLD VENIPUNCTURE: CPT | Performed by: NURSE PRACTITIONER

## 2025-06-22 PROCEDURE — 2500000001 HC RX 250 WO HCPCS SELF ADMINISTERED DRUGS (ALT 637 FOR MEDICARE OP): Performed by: PHYSICIAN ASSISTANT

## 2025-06-22 RX ORDER — POLYETHYLENE GLYCOL 3350 17 G/17G
17 POWDER, FOR SOLUTION ORAL DAILY
Qty: 30 PACKET | Refills: 0 | Status: SHIPPED | OUTPATIENT
Start: 2025-06-22 | End: 2025-07-22

## 2025-06-22 RX ORDER — FUROSEMIDE 20 MG/1
20 TABLET ORAL DAILY PRN
Qty: 30 TABLET | Refills: 0 | Status: SHIPPED | OUTPATIENT
Start: 2025-06-22 | End: 2025-07-22

## 2025-06-22 RX ORDER — OXYCODONE AND ACETAMINOPHEN 5; 325 MG/1; MG/1
1 TABLET ORAL EVERY 8 HOURS PRN
Qty: 9 TABLET | Refills: 0 | Status: SHIPPED | OUTPATIENT
Start: 2025-06-22 | End: 2025-06-25

## 2025-06-22 RX ADMIN — POLYETHYLENE GLYCOL 3350 17 G: 17 POWDER, FOR SOLUTION ORAL at 09:47

## 2025-06-22 RX ADMIN — POTASSIUM CHLORIDE 20 MEQ: 1500 TABLET, EXTENDED RELEASE ORAL at 09:47

## 2025-06-22 RX ADMIN — DULOXETINE 60 MG: 30 CAPSULE, DELAYED RELEASE ORAL at 09:46

## 2025-06-22 RX ADMIN — LEVOTHYROXINE SODIUM 100 MCG: 0.1 TABLET ORAL at 05:26

## 2025-06-22 RX ADMIN — PANTOPRAZOLE SODIUM 40 MG: 40 INJECTION, POWDER, FOR SOLUTION INTRAVENOUS at 09:47

## 2025-06-22 RX ADMIN — PREDNISONE 10 MG: 10 TABLET ORAL at 09:47

## 2025-06-22 RX ADMIN — HYDROXYCHLOROQUINE SULFATE 200 MG: 200 TABLET, FILM COATED ORAL at 09:46

## 2025-06-22 RX ADMIN — ATORVASTATIN CALCIUM 20 MG: 20 TABLET, FILM COATED ORAL at 09:47

## 2025-06-22 ASSESSMENT — PAIN SCALES - GENERAL: PAINLEVEL_OUTOF10: 0 - NO PAIN

## 2025-06-22 NOTE — HH CARE COORDINATION
Home Care received a Referral for Nursing, Physical Therapy, and Occupational Therapy. We have processed the referral for a Start of Care on 6/23 OR 6/24.     If you have any questions or concerns, please feel free to contact us at [phone]. Follow the prompts, enter your five digit zip code, and you will be directed to your care team on [facility].

## 2025-06-22 NOTE — CARE PLAN
The patient's goals for the shift include    Go home today    The clinical goals for the shift include remain safe and free from pain      Problem: Discharge Planning  Goal: Discharge to home or other facility with appropriate resources  Outcome: Progressing

## 2025-06-22 NOTE — CARE PLAN
Problem: Pain - Adult  Goal: Verbalizes/displays adequate comfort level or baseline comfort level  Outcome: Progressing     Problem: Safety - Adult  Goal: Free from fall injury  Outcome: Progressing     Problem: Discharge Planning  Goal: Discharge to home or other facility with appropriate resources  Outcome: Progressing     Problem: Chronic Conditions and Co-morbidities  Goal: Patient's chronic conditions and co-morbidity symptoms are monitored and maintained or improved  Outcome: Progressing     Problem: Nutrition  Goal: Nutrient intake appropriate for maintaining nutritional needs  Outcome: Progressing     Problem: Skin  Goal: Decreased wound size/increased tissue granulation at next dressing change  Outcome: Progressing  Goal: Participates in plan/prevention/treatment measures  Outcome: Progressing  Goal: Prevent/manage excess moisture  Outcome: Progressing  Goal: Prevent/minimize sheer/friction injuries  Outcome: Progressing  Goal: Promote/optimize nutrition  Outcome: Progressing  Goal: Promote skin healing  Outcome: Progressing   The patient's goals for the shift include      The clinical goals for the shift include Remain HDS    Over the shift, the patient did not make progress toward the following goals. Barriers to progression include patient experiencing weakness. Recommendations to address these barriers include remind patient to use call light when in need of assistance.

## 2025-06-22 NOTE — CARE PLAN
RN called stating patient is requesting Tums for acid reflux. Patient has protonix IV ordered already. Tums ordered PRN. Attending to follow.

## 2025-06-22 NOTE — DISCHARGE INSTRUCTIONS
Continue diet as recommended by speech:   Solid Diet Recommendations : Easy to Chew moisten starches/meats)  Liquid Diet Recommendations: Thin

## 2025-06-22 NOTE — DISCHARGE SUMMARY
"Florencia Wang is a 69 y.o. female on day 4 of admission presenting with Generalized weakness.    Subjective        Objective     Physical Exam  Constitutional:       Appearance: Normal appearance.   HENT:      Head: Normocephalic and atraumatic.      Right Ear: Tympanic membrane and ear canal normal.      Left Ear: Tympanic membrane and ear canal normal.      Mouth/Throat:      Mouth: Mucous membranes are moist.      Pharynx: Oropharynx is clear.   Eyes:      Extraocular Movements: Extraocular movements intact.      Conjunctiva/sclera: Conjunctivae normal.      Pupils: Pupils are equal, round, and reactive to light.   Cardiovascular:      Rate and Rhythm: Normal rate and regular rhythm.      Pulses: Normal pulses.      Heart sounds: Normal heart sounds.   Pulmonary:      Effort: Pulmonary effort is normal.      Breath sounds: Normal breath sounds.   Abdominal:      General: Abdomen is flat. Bowel sounds are normal.      Palpations: Abdomen is soft.   Musculoskeletal:         General: Normal range of motion.      Cervical back: Normal range of motion and neck supple.   Skin:     General: Skin is warm and dry.      Capillary Refill: Capillary refill takes 2 to 3 seconds.   Neurological:      General: No focal deficit present.      Mental Status: She is alert and oriented to person, place, and time. Mental status is at baseline.   Psychiatric:         Mood and Affect: Mood normal.         Behavior: Behavior normal.         Thought Content: Thought content normal.         Judgment: Judgment normal.         Last Recorded Vitals  Blood pressure 117/68, pulse 71, temperature 36.1 °C (97 °F), temperature source Temporal, resp. rate 18, height 1.88 m (6' 2.02\"), weight 65.3 kg (143 lb 15.4 oz), SpO2 95%.  Intake/Output last 3 Shifts:  I/O last 3 completed shifts:  In: 480 (7.4 mL/kg) [P.O.:480]  Out: - (0 mL/kg)   Weight: 65.3 kg     Relevant Results  Scheduled medications  Scheduled Medications[1]  Continuous " medications  Continuous Medications[2]  PRN medications  PRN Medications[3]  Results for orders placed or performed during the hospital encounter of 06/18/25 (from the past 24 hours)   CBC   Result Value Ref Range    WBC 6.7 4.4 - 11.3 x10*3/uL    nRBC 0.0 0.0 - 0.0 /100 WBCs    RBC 3.68 (L) 4.00 - 5.20 x10*6/uL    Hemoglobin 10.0 (L) 12.0 - 16.0 g/dL    Hematocrit 28.5 (L) 36.0 - 46.0 %    MCV 77 (L) 80 - 100 fL    MCH 27.2 26.0 - 34.0 pg    MCHC 35.1 32.0 - 36.0 g/dL    RDW 15.9 (H) 11.5 - 14.5 %    Platelets 198 150 - 450 x10*3/uL   Comprehensive Metabolic Panel   Result Value Ref Range    Glucose 83 74 - 99 mg/dL    Sodium 139 136 - 145 mmol/L    Potassium 3.6 3.5 - 5.3 mmol/L    Chloride 105 98 - 107 mmol/L    Bicarbonate 29 21 - 32 mmol/L    Anion Gap 9 (L) 10 - 20 mmol/L    Urea Nitrogen 14 6 - 23 mg/dL    Creatinine 0.90 0.50 - 1.05 mg/dL    eGFR 68 >60 mL/min/1.73m*2    Calcium 8.7 8.6 - 10.3 mg/dL    Albumin 3.1 (L) 3.4 - 5.0 g/dL    Alkaline Phosphatase 54 33 - 136 U/L    Total Protein 6.2 (L) 6.4 - 8.2 g/dL    AST 17 9 - 39 U/L    Bilirubin, Total 0.5 0.0 - 1.2 mg/dL    ALT 9 7 - 45 U/L   CBC   Result Value Ref Range    WBC 6.5 4.4 - 11.3 x10*3/uL    nRBC 0.0 0.0 - 0.0 /100 WBCs    RBC 3.90 (L) 4.00 - 5.20 x10*6/uL    Hemoglobin 10.8 (L) 12.0 - 16.0 g/dL    Hematocrit 30.6 (L) 36.0 - 46.0 %    MCV 79 (L) 80 - 100 fL    MCH 27.7 26.0 - 34.0 pg    MCHC 35.3 32.0 - 36.0 g/dL    RDW 16.0 (H) 11.5 - 14.5 %    Platelets 205 150 - 450 x10*3/uL   Comprehensive Metabolic Panel   Result Value Ref Range    Glucose 78 74 - 99 mg/dL    Sodium 139 136 - 145 mmol/L    Potassium 3.8 3.5 - 5.3 mmol/L    Chloride 105 98 - 107 mmol/L    Bicarbonate 29 21 - 32 mmol/L    Anion Gap 9 (L) 10 - 20 mmol/L    Urea Nitrogen 10 6 - 23 mg/dL    Creatinine 0.84 0.50 - 1.05 mg/dL    eGFR 74 >60 mL/min/1.73m*2    Calcium 8.7 8.6 - 10.3 mg/dL    Albumin 3.2 (L) 3.4 - 5.0 g/dL    Alkaline Phosphatase 56 33 - 136 U/L    Total Protein 6.3  (L) 6.4 - 8.2 g/dL    AST 19 9 - 39 U/L    Bilirubin, Total 0.5 0.0 - 1.2 mg/dL    ALT 11 7 - 45 U/L     *Note: Due to a large number of results and/or encounters for the requested time period, some results have not been displayed. A complete set of results can be found in Results Review.                 Malnutrition Diagnosis Status: New  Malnutrition Diagnosis: Severe malnutrition related to starvation  Related to: decreased appetite, inability to swallow  As Evidenced by: severe muscle wasting and severe subcutaneous fat loss, likely PO intakes meeting <=50% of EEN for >=1 month;  I agree with the dietitian's malnutrition diagnosis.      Assessment & Plan  Lower extremity weakness  Change in bowel movement  Unintentional weight loss  Dysphagia, unspecified  Vision changes  History of migraine  Allergy to imaging contrast media  Esophageal dysmotility  -Telemetry monitoring  -metabolic work up unrevealing  - Pan positive review of systems  - Change in bowel pattern  as well as bilateral lower extremity weakness for cauda equina/nerve compression.  Obtain CT scans of the axial spine.   -Neurology consult, appreciate input  - CT chest abdomen pelvis with IV contrast to further evaluate for possible etiologies for weight loss  - Clear liquid diet, n.p.o. after midnight  - GI consult, appreciate input may benefit from EGD +/-colonoscopy given  family history   - Elevated troponin, likely demand ischemia as patient has been having chest pain we will repeat a third troponin.    - ESR and CRP very mildly elevated, low suspicion for lupus flare  - metabolic work up unrevealing       Chronic issues:   #COPD   #Hypertension   #Hyperlipidemia   #TIA   #Arthritis   #SLE   #Hemoglobin C trait   #Osteoarthritis   #Hypothyroidism   #Migraines      Continue with patient's home medications as appropriate once entered by nursing  Supplemental oxygen at night and as needed     #Dvt ppx  Lovenox subcutaneous  SCD    6/20: CT reviewd.  Lots of findings, lots nonspecific, will get EGD today to evalaute distention.  6/21:   EGD shows Concerns for esophageal dysmotility. Gastritis also notated. Need speech eval. Order placed;   Needs repeat PT eval. For dc planning.   Outpt neuro workup for migraines  DC planning next 24- 48 hrs   6/23; seen by speech tolerated diet: Solid Diet Recommendations : Easy to Chew (moisten starches/meats)  Liquid Diet Recommendations: Thin   Dc home today   FU with with neurology for migraine treatment options FU with PCP in 1-2 weeks of DC  Home health orders have been placed.       Harriet Go, APRN-CNP         [1] ALPRAZolam, 0.5 mg, oral, Once  aspirin, 81 mg, oral, Every Sun/Tues/Thur/Sat  atorvastatin, 20 mg, oral, Daily  DULoxetine, 60 mg, oral, Daily  enoxaparin, 40 mg, subcutaneous, q24h  hydroxychloroquine, 200 mg, oral, Daily  levothyroxine, 100 mcg, oral, Daily  pantoprazole, 40 mg, intravenous, Daily  polyethylene glycol, 17 g, oral, Daily  potassium chloride CR, 20 mEq, oral, Daily  predniSONE, 10 mg, oral, Every other day     [2]    [3] PRN medications: acetaminophen, calcium carbonate, fluticasone, ipratropium-albuteroL, nitroglycerin, ondansetron, oxyCODONE-acetaminophen, oxygen, zolpidem

## 2025-06-23 LAB
ACHR BLOCK AB/ACHR TOTAL SFR SER: 0 % (ref 0–26)
MUSK AB SER QL: NORMAL

## 2025-06-23 NOTE — DOCUMENTATION CLARIFICATION NOTE
"    PATIENT:               YONIS LE  ACCT #:                  [id number]  MRN:                       [id number]  :                       1955  ADMIT DATE:       2025 2:21 PM  DISCH DATE:        2025 1:08 PM  RESPONDING PROVIDER #:        [id number]          PROVIDER RESPONSE TEXT:    Type II MI    CDI QUERY TEXT:    Clarification    Instruction:    Based on your assessment of the patient and the clinical information, please provide the requested documentation by clicking on the appropriate radio button and enter any additional information if prompted.    Question: Is there a diagnosis indicative of the patient elevated Troponins and symptoms    When answering this query, please exercise your independent professional judgment. The fact that a question is being asked, does not imply that any particular answer is desired or expected.    The patient's clinical indicators include:  Clinical Information: 69 year old female presents with numerous complaints including chest pain    Clinical Indicators:  ED Note: \"ECG revealed normal sinus rhythm at a rate of 79 beats per minute with WV interval 190 , QRS of 93 , QTc of 395.  No acute injury pattern.    Cardide troponin minimally elevated patient has no chest pain no acute injury pattern on EKG and no runs of dysrhythmias doubt atypical ACS at this time.\"     H&P: \"Elevated troponin, likely demand ischemia as patient has been having chest pain we will repeat a third troponin.\"     Cardiology Consult: \"She reports chronic chest tightness and sharp chest pain that occurs in the morning and at night at rest. She occasionally feels chest tightness when walking in her home.  She also notes chronic stable shortness of breath that she relates to her COPD/asthma. \"    Labs:  Trop 47, 52, 67     Vital Signs: T 37.3, HR 70, Resp 16, /61, SPO2 94% on RA    Treatment: Cardiology Consult, monitoring labs, EKG    Risk Factors: History of: HTN, " HLD, CAD, COPD, sickle cell anemia, lupus  Options provided:  -- Type II MI  -- Acute Myocardial Injury  -- Other - I will add my own diagnosis  -- Refer to Clinical Documentation Reviewer    Query created by: Nhung Watson on 6/20/2025 10:40 AM      Electronically signed by:  LOUIE PARKER DO 6/23/2025 2:23 PM

## 2025-06-24 ENCOUNTER — HOME CARE VISIT (OUTPATIENT)
Dept: HOME HEALTH SERVICES | Facility: HOME HEALTH | Age: 70
End: 2025-06-24
Payer: MEDICARE

## 2025-06-24 VITALS
OXYGEN SATURATION: 92 % | SYSTOLIC BLOOD PRESSURE: 108 MMHG | RESPIRATION RATE: 18 BRPM | DIASTOLIC BLOOD PRESSURE: 62 MMHG | TEMPERATURE: 98.8 F | HEART RATE: 83 BPM

## 2025-06-24 PROCEDURE — G0299 HHS/HOSPICE OF RN EA 15 MIN: HCPCS | Mod: HHH

## 2025-06-24 PROCEDURE — 169592 NO-PAY CLAIM PROCEDURE

## 2025-06-24 SDOH — ECONOMIC STABILITY: HOUSING INSECURITY: EVIDENCE OF SMOKING MATERIAL: 0

## 2025-06-24 SDOH — HEALTH STABILITY: MENTAL HEALTH: SMOKING IN HOME: 0

## 2025-06-24 ASSESSMENT — ENCOUNTER SYMPTOMS
SUBJECTIVE PAIN PROGRESSION: UNCHANGED
LOWER EXTREMITY EDEMA: 1
DYSPNEA ACTIVITY LEVEL: AFTER AMBULATING 10 - 20 FT
DYSPNEA ON EXERTION: 1
PERSON REPORTING PAIN: PATIENT
PAIN SEVERITY GOAL: 0/10
HIGHEST PAIN SEVERITY IN PAST 24 HOURS: 7/10
STOOL FREQUENCY: DAILY
LAST BOWEL MOVEMENT: 67378
PAIN LOCATION - PAIN SEVERITY: 5/10
PAIN LOCATION: BACK
APPETITE LEVEL: FAIR
CHANGE IN APPETITE: UNCHANGED
PAIN LOCATION - PAIN FREQUENCY: CONSTANT
FATIGUES EASILY: 1
MUSCLE WEAKNESS: 1
LOWEST PAIN SEVERITY IN PAST 24 HOURS: 4/10
PAIN: 1
SHORTNESS OF BREATH: 1
CONSTIPATION: 1
FATIGUE: 1

## 2025-06-24 ASSESSMENT — ACTIVITIES OF DAILY LIVING (ADL)
OASIS_M1830: 05
ENTERING_EXITING_HOME: MAXIMUM ASSIST

## 2025-06-24 ASSESSMENT — LIFESTYLE VARIABLES: SMOKING_STATUS: 0

## 2025-06-25 ENCOUNTER — HOME CARE VISIT (OUTPATIENT)
Dept: HOME HEALTH SERVICES | Facility: HOME HEALTH | Age: 70
End: 2025-06-25
Payer: MEDICARE

## 2025-06-25 VITALS — HEART RATE: 73 BPM | OXYGEN SATURATION: 97 %

## 2025-06-25 VITALS
HEART RATE: 68 BPM | DIASTOLIC BLOOD PRESSURE: 70 MMHG | TEMPERATURE: 98 F | OXYGEN SATURATION: 97 % | SYSTOLIC BLOOD PRESSURE: 110 MMHG

## 2025-06-25 PROCEDURE — G0153 HHCP-SVS OF S/L PATH,EA 15MN: HCPCS | Mod: HHH

## 2025-06-25 PROCEDURE — G0151 HHCP-SERV OF PT,EA 15 MIN: HCPCS | Mod: HHH

## 2025-06-25 SDOH — HEALTH STABILITY: PHYSICAL HEALTH: EXERCISE TYPE: SEATED

## 2025-06-25 SDOH — HEALTH STABILITY: MENTAL HEALTH: SMOKING IN HOME: 1

## 2025-06-25 SDOH — ECONOMIC STABILITY: HOUSING INSECURITY: EVIDENCE OF SMOKING MATERIAL: 1

## 2025-06-25 ASSESSMENT — ENCOUNTER SYMPTOMS
PAIN LOCATION - PAIN SEVERITY: 7/10
HIGHEST PAIN SEVERITY IN PAST 24 HOURS: 9/10
PAIN: 1
PERSON REPORTING PAIN: PATIENT
PAIN LOCATION - PAIN SEVERITY: 7/10
PAIN LOCATION: LEFT HIP
PAIN LOCATION: ABDOMEN
PAIN: 1
SUBJECTIVE PAIN PROGRESSION: WAXING AND WANING
AGGRESSION WITHIN DEFINED LIMITS: 1
PERSON REPORTING PAIN: PATIENT
LOWEST PAIN SEVERITY IN PAST 24 HOURS: 6/10
ANGER WITHIN DEFINED LIMITS: 1

## 2025-06-25 ASSESSMENT — PAIN SCALES - PAIN ASSESSMENT IN ADVANCED DEMENTIA (PAINAD)
BREATHING: 0
NEGVOCALIZATION: 0 - NONE.
BODYLANGUAGE: 0 - RELAXED.
CONSOLABILITY: 0
CONSOLABILITY: 0 - NO NEED TO CONSOLE.
TOTALSCORE: 0
FACIALEXPRESSION: 0 - SMILING OR INEXPRESSIVE.
FACIALEXPRESSION: 0
NEGVOCALIZATION: 0
BODYLANGUAGE: 0

## 2025-06-25 ASSESSMENT — ACTIVITIES OF DAILY LIVING (ADL)
CALENDAR_MANAGEMENT: PT MANAGES CALENDAR
AMBULATION ASSISTANCE ON FLAT SURFACES: 1
AMBULATION_DISTANCE/DURATION_TOLERATED: 50 FEET

## 2025-06-27 ENCOUNTER — HOME CARE VISIT (OUTPATIENT)
Dept: HOME HEALTH SERVICES | Facility: HOME HEALTH | Age: 70
End: 2025-06-27
Payer: MEDICARE

## 2025-06-27 VITALS — DIASTOLIC BLOOD PRESSURE: 82 MMHG | SYSTOLIC BLOOD PRESSURE: 114 MMHG | HEART RATE: 75 BPM

## 2025-06-27 LAB
LABORATORY COMMENT REPORT: NORMAL
PATH REPORT.FINAL DX SPEC: NORMAL
PATH REPORT.GROSS SPEC: NORMAL
PATH REPORT.RELEVANT HX SPEC: NORMAL
PATH REPORT.TOTAL CANCER: NORMAL

## 2025-06-27 PROCEDURE — G0152 HHCP-SERV OF OT,EA 15 MIN: HCPCS | Mod: HHH

## 2025-06-27 SDOH — ECONOMIC STABILITY: HOUSING INSECURITY: EVIDENCE OF SMOKING MATERIAL: 0

## 2025-06-27 SDOH — HEALTH STABILITY: MENTAL HEALTH: SMOKING IN HOME: 1

## 2025-06-27 ASSESSMENT — ACTIVITIES OF DAILY LIVING (ADL)
TOILETING: 1
WASHING_UPB_CURRENT_FUNCTION: CONTACT GUARD ASSIST
FEEDING_WITHIN_DEFINED_LIMITS: 1
BATHING ASSESSED: 1
WASHING_LB_CURRENT_FUNCTION: MODERATE ASSIST
TOILETING: CONTACT GUARD ASSIST
DRESSING_LB_CURRENT_FUNCTION: MINIMUM ASSIST
BATHING_CURRENT_FUNCTION: MODERATE ASSIST
PREPARING MEALS: NEEDS ASSISTANCE
WASHING_HAIR_CURRENT_FUNCTION: MODERATE ASSIST
WASHING_LB_CURRENT_FUNCTION: MINIMUM ASSIST
DRESSING_UB_CURRENT_FUNCTION: CONTACT GUARD ASSIST
GROOMING_CURRENT_FUNCTION: INDEPENDENT
HAIR_CARE_ASSESSED: 1
WASHING_UPB_CURRENT_FUNCTION: MINIMUM ASSIST
WASHING_BACK_CURRENT_FUNCTION: MODERATE ASSIST
GROOMING ASSESSED: 1
HAIR_CARE_MODERATE_ASSIST: 1

## 2025-06-27 ASSESSMENT — ENCOUNTER SYMPTOMS
PAIN LOCATION - PAIN SEVERITY: 6/10
PAIN: 1
PERSON REPORTING PAIN: PATIENT
SUBJECTIVE PAIN PROGRESSION: GRADUALLY IMPROVING
PAIN SEVERITY GOAL: 4/10
LOWEST PAIN SEVERITY IN PAST 24 HOURS: 5/10
HIGHEST PAIN SEVERITY IN PAST 24 HOURS: 6/10
PAIN LOCATION - PAIN SEVERITY: 2/10
PAIN LOCATION: BACK
PAIN LOCATION: CHEST

## 2025-06-27 ASSESSMENT — PAIN SCALES - PAIN ASSESSMENT IN ADVANCED DEMENTIA (PAINAD)
CONSOLABILITY: 0 - NO NEED TO CONSOLE.
CONSOLABILITY: 0
NEGVOCALIZATION: 1 - OCCASIONAL MOAN OR GROAN. LOW-LEVEL SPEECH WITH A NEGATIVE OR DISAPPROVING QUALITY.
BODYLANGUAGE: 0
BODYLANGUAGE: 0 - RELAXED.
FACIALEXPRESSION: 1
NEGVOCALIZATION: 1
TOTALSCORE: 2
BREATHING: 0
FACIALEXPRESSION: 1 - SAD. FRIGHTENED. FROWN.

## 2025-06-30 ENCOUNTER — HOME CARE VISIT (OUTPATIENT)
Dept: HOME HEALTH SERVICES | Facility: HOME HEALTH | Age: 70
End: 2025-06-30
Payer: MEDICARE

## 2025-07-01 ENCOUNTER — APPOINTMENT (OUTPATIENT)
Dept: PRIMARY CARE | Facility: CLINIC | Age: 70
End: 2025-07-01
Payer: MEDICARE

## 2025-07-01 VITALS
HEART RATE: 77 BPM | BODY MASS INDEX: 18.61 KG/M2 | SYSTOLIC BLOOD PRESSURE: 126 MMHG | DIASTOLIC BLOOD PRESSURE: 64 MMHG | OXYGEN SATURATION: 96 % | WEIGHT: 145 LBS

## 2025-07-01 DIAGNOSIS — I25.10 CORONARY ARTERY DISEASE INVOLVING NATIVE CORONARY ARTERY OF NATIVE HEART WITHOUT ANGINA PECTORIS: ICD-10-CM

## 2025-07-01 DIAGNOSIS — E03.2 HYPOTHYROIDISM DUE TO MEDICATION: Primary | ICD-10-CM

## 2025-07-01 DIAGNOSIS — M79.18 DIFFUSE MYOFASCIAL PAIN SYNDROME: ICD-10-CM

## 2025-07-01 PROCEDURE — 1036F TOBACCO NON-USER: CPT | Performed by: STUDENT IN AN ORGANIZED HEALTH CARE EDUCATION/TRAINING PROGRAM

## 2025-07-01 PROCEDURE — 1159F MED LIST DOCD IN RCRD: CPT | Performed by: STUDENT IN AN ORGANIZED HEALTH CARE EDUCATION/TRAINING PROGRAM

## 2025-07-01 PROCEDURE — 99495 TRANSJ CARE MGMT MOD F2F 14D: CPT | Performed by: STUDENT IN AN ORGANIZED HEALTH CARE EDUCATION/TRAINING PROGRAM

## 2025-07-01 PROCEDURE — 1111F DSCHRG MED/CURRENT MED MERGE: CPT | Performed by: STUDENT IN AN ORGANIZED HEALTH CARE EDUCATION/TRAINING PROGRAM

## 2025-07-01 ASSESSMENT — ENCOUNTER SYMPTOMS: DEPRESSION: 0

## 2025-07-01 NOTE — PROGRESS NOTES
Subjective   Patient ID: Florencia Wang is a 69 y.o. female who presents for Hospital Follow-up.    HPI     Florencia Wang is a 69 year old female with past medical history of  hypertension, hyperlipidemia, CAD (Nuclear medicine stress test 5/9/2025 normal), COPD on supplemental oxygen as needed, TIA, arthritis, SLE, hemoglobin C trait, osteoarthritis, avascular necrosis of  bilateral hips, hypothyroidism, epilepsy, and migraines. Patient presents with numerous complaints including lower ext weakness, headache, dysphagia, change in bowel pattern, and unintentional weight loss.       Extensive positive ROS including:  - Migraines/headaches -having them more frequently and have been more severe recently. Follows with Dr Paulson.  - Lightheadedness, but no syncope.  - feels like she is ”floating outside her body”  - Sinus congestion and a large amount of mucous production  - reports seeing floaters seen eyes open and closed, reports this started recently - “specks of white”. No acute vision loss.   - Dysphagia primarily with solids, feels like things are not going down. Tolerating fluids better. Has been only eating some fruit in the mornings and peanut butter sandwich in the evening. Poor Appetite. 10 pound unintentional weight loss x 3 weeks. History of esophageal motility issues, manometry done 02/2023  - reports stools are soft but she is unable to push them out and has to manually apply pressure around rectum to pass stool.  - History benign breast mass, feels as though it has returned in the left breast.  - No dysuria. Frequent urination at night.  - Bilateral lower extremity weakness. Reports difficulty with ambulation, has been staying mostly in bed. States needing to lift her legs with hands to move them in the bed. Also reports numbness in lower ext. but no saddle anesthesia  - was unable to decide on if she has been having chest pian, no palpitations.  - SOB. Worse with activity. More recently, has been  wearing 2-3 L NC O2 at night and occasionally during the day. Reports wheezing.  - Reports back pain, but states she can mentally block out the pain. Reports degenerative disk disease. Does not want back surgery.  - Reports bruising on lower extremities, no open lesions or wounds  - Denies recent new medications  - States current symptoms could be a lupus flare  -Code status is full code.     ER Course: Hemodynamically stable, afebrile.  EKG showed Sinus rhythm with PACs and left anterior fascicular block, right ventricular hypertrophy, no acute ST changes.  WBC 5.5, hemoglobin 12.2/hematocrit 34.9, microcytic, normochromic, platelets 202.  Glucose 102 otherwise CMP is normal.  High-sensitivity troponin 47 with repeat of 52.  UA for infection, 1+ urobilinogen.  Influenza and COVID-19 negative.  CT head without contrast negative for acute process. CXR showed no acute findings.    Egd show some dysmotility  States she is doing better, eatin small bite  Thryoid adjusted and doing well    Review of Systems   All other systems reviewed and are negative.      Objective   /64 (BP Location: Right arm, Patient Position: Sitting, BP Cuff Size: Small adult)   Pulse 77   Wt 65.8 kg (145 lb)   SpO2 96%   BMI 18.61 kg/m²     Physical Exam  Constitutional:       Appearance: Normal appearance.   HENT:      Head: Normocephalic and atraumatic.      Right Ear: Tympanic membrane and ear canal normal.      Left Ear: Tympanic membrane and ear canal normal.      Mouth/Throat:      Mouth: Mucous membranes are moist.      Pharynx: Oropharynx is clear.   Eyes:      Extraocular Movements: Extraocular movements intact.      Conjunctiva/sclera: Conjunctivae normal.      Pupils: Pupils are equal, round, and reactive to light.   Cardiovascular:      Rate and Rhythm: Normal rate and regular rhythm.      Pulses: Normal pulses.      Heart sounds: Normal heart sounds.   Pulmonary:      Effort: Pulmonary effort is normal.      Breath sounds:  Normal breath sounds.   Abdominal:      General: Abdomen is flat. Bowel sounds are normal.      Palpations: Abdomen is soft.   Musculoskeletal:         General: Normal range of motion.      Cervical back: Normal range of motion and neck supple.   Skin:     General: Skin is warm and dry.      Capillary Refill: Capillary refill takes 2 to 3 seconds.   Neurological:      General: No focal deficit present.      Mental Status: She is alert and oriented to person, place, and time. Mental status is at baseline.   Psychiatric:         Mood and Affect: Mood normal.         Behavior: Behavior normal.         Thought Content: Thought content normal.         Judgment: Judgment normal.       Assessment/Plan   1. Hypothyroidism due to medication (Primary)  Thyroid med adjusted      2. Coronary artery disease involving native coronary artery of native heart without angina pectoris  Stable no isshes  No chest pains    3. Diffuse myofascial pain syndrome  Follows rheum  Doing better form hospital  Overall improving  Contineu current meds  Hospital reviwed

## 2025-07-02 ENCOUNTER — HOME CARE VISIT (OUTPATIENT)
Dept: HOME HEALTH SERVICES | Facility: HOME HEALTH | Age: 70
End: 2025-07-02
Payer: MEDICARE

## 2025-07-03 ENCOUNTER — APPOINTMENT (OUTPATIENT)
Dept: CARDIOLOGY | Facility: HOSPITAL | Age: 70
End: 2025-07-03
Payer: MEDICARE

## 2025-07-03 ENCOUNTER — APPOINTMENT (OUTPATIENT)
Dept: RADIOLOGY | Facility: CLINIC | Age: 70
End: 2025-07-03
Payer: MEDICARE

## 2025-07-03 ENCOUNTER — APPOINTMENT (OUTPATIENT)
Dept: RADIOLOGY | Facility: HOSPITAL | Age: 70
End: 2025-07-03
Payer: MEDICARE

## 2025-07-03 ENCOUNTER — HOME CARE VISIT (OUTPATIENT)
Dept: HOME HEALTH SERVICES | Facility: HOME HEALTH | Age: 70
End: 2025-07-03
Payer: MEDICARE

## 2025-07-03 ENCOUNTER — HOSPITAL ENCOUNTER (EMERGENCY)
Facility: HOSPITAL | Age: 70
Discharge: HOME | End: 2025-07-03
Attending: EMERGENCY MEDICINE
Payer: MEDICARE

## 2025-07-03 ENCOUNTER — TELEPHONE (OUTPATIENT)
Dept: PRIMARY CARE | Facility: CLINIC | Age: 70
End: 2025-07-03

## 2025-07-03 ENCOUNTER — APPOINTMENT (OUTPATIENT)
Dept: ORTHOPEDIC SURGERY | Facility: CLINIC | Age: 70
End: 2025-07-03
Payer: MEDICARE

## 2025-07-03 VITALS
OXYGEN SATURATION: 99 % | SYSTOLIC BLOOD PRESSURE: 129 MMHG | BODY MASS INDEX: 19.23 KG/M2 | TEMPERATURE: 97.2 F | HEIGHT: 72 IN | RESPIRATION RATE: 15 BRPM | DIASTOLIC BLOOD PRESSURE: 89 MMHG | HEART RATE: 79 BPM | WEIGHT: 142 LBS

## 2025-07-03 DIAGNOSIS — M25.551 RIGHT HIP PAIN: ICD-10-CM

## 2025-07-03 DIAGNOSIS — K59.00 CONSTIPATION, UNSPECIFIED CONSTIPATION TYPE: Primary | ICD-10-CM

## 2025-07-03 DIAGNOSIS — R10.84 ABDOMINAL PAIN, GENERALIZED: ICD-10-CM

## 2025-07-03 LAB
ALBUMIN SERPL BCP-MCNC: 3.8 G/DL (ref 3.4–5)
ALP SERPL-CCNC: 65 U/L (ref 33–136)
ALT SERPL W P-5'-P-CCNC: 7 U/L (ref 7–45)
ANION GAP SERPL CALC-SCNC: 9 MMOL/L (ref 10–20)
APPEARANCE UR: CLEAR
AST SERPL W P-5'-P-CCNC: 14 U/L (ref 9–39)
BASOPHILS # BLD AUTO: 0.02 X10*3/UL (ref 0–0.1)
BASOPHILS NFR BLD AUTO: 0.4 %
BILIRUB SERPL-MCNC: 0.9 MG/DL (ref 0–1.2)
BILIRUB UR STRIP.AUTO-MCNC: NEGATIVE MG/DL
BUN SERPL-MCNC: 12 MG/DL (ref 6–23)
CALCIUM SERPL-MCNC: 9.4 MG/DL (ref 8.6–10.3)
CARDIAC TROPONIN I PNL SERPL HS: 24 NG/L (ref 0–13)
CARDIAC TROPONIN I PNL SERPL HS: 33 NG/L (ref 0–13)
CHLORIDE SERPL-SCNC: 107 MMOL/L (ref 98–107)
CO2 SERPL-SCNC: 27 MMOL/L (ref 21–32)
COLOR UR: ABNORMAL
CREAT SERPL-MCNC: 0.81 MG/DL (ref 0.5–1.05)
EGFRCR SERPLBLD CKD-EPI 2021: 78 ML/MIN/1.73M*2
EOSINOPHIL # BLD AUTO: 0.02 X10*3/UL (ref 0–0.4)
EOSINOPHIL NFR BLD AUTO: 0.4 %
ERYTHROCYTE [DISTWIDTH] IN BLOOD BY AUTOMATED COUNT: 17 % (ref 11.5–14.5)
GLUCOSE SERPL-MCNC: 84 MG/DL (ref 74–99)
GLUCOSE UR STRIP.AUTO-MCNC: NORMAL MG/DL
HCT VFR BLD AUTO: 30 % (ref 36–46)
HGB BLD-MCNC: 10.4 G/DL (ref 12–16)
IMM GRANULOCYTES # BLD AUTO: 0.02 X10*3/UL (ref 0–0.5)
IMM GRANULOCYTES NFR BLD AUTO: 0.4 % (ref 0–0.9)
KETONES UR STRIP.AUTO-MCNC: NEGATIVE MG/DL
LACTATE SERPL-SCNC: 0.9 MMOL/L (ref 0.4–2)
LEUKOCYTE ESTERASE UR QL STRIP.AUTO: NEGATIVE
LIPASE SERPL-CCNC: 18 U/L (ref 9–82)
LYMPHOCYTES # BLD AUTO: 2.41 X10*3/UL (ref 0.8–3)
LYMPHOCYTES NFR BLD AUTO: 42.8 %
MCH RBC QN AUTO: 27.2 PG (ref 26–34)
MCHC RBC AUTO-ENTMCNC: 34.7 G/DL (ref 32–36)
MCV RBC AUTO: 78 FL (ref 80–100)
MONOCYTES # BLD AUTO: 0.26 X10*3/UL (ref 0.05–0.8)
MONOCYTES NFR BLD AUTO: 4.6 %
NEUTROPHILS # BLD AUTO: 2.9 X10*3/UL (ref 1.6–5.5)
NEUTROPHILS NFR BLD AUTO: 51.4 %
NITRITE UR QL STRIP.AUTO: NEGATIVE
NRBC BLD-RTO: 0 /100 WBCS (ref 0–0)
PH UR STRIP.AUTO: 7 [PH]
PLATELET # BLD AUTO: 166 X10*3/UL (ref 150–450)
POTASSIUM SERPL-SCNC: 3.8 MMOL/L (ref 3.5–5.3)
PROT SERPL-MCNC: 7.1 G/DL (ref 6.4–8.2)
PROT UR STRIP.AUTO-MCNC: NEGATIVE MG/DL
RBC # BLD AUTO: 3.83 X10*6/UL (ref 4–5.2)
RBC # UR STRIP.AUTO: NEGATIVE MG/DL
SODIUM SERPL-SCNC: 139 MMOL/L (ref 136–145)
SP GR UR STRIP.AUTO: 1.01
UROBILINOGEN UR STRIP.AUTO-MCNC: ABNORMAL MG/DL
WBC # BLD AUTO: 5.6 X10*3/UL (ref 4.4–11.3)

## 2025-07-03 PROCEDURE — 74176 CT ABD & PELVIS W/O CONTRAST: CPT | Performed by: RADIOLOGY

## 2025-07-03 PROCEDURE — 2500000001 HC RX 250 WO HCPCS SELF ADMINISTERED DRUGS (ALT 637 FOR MEDICARE OP)

## 2025-07-03 PROCEDURE — 36415 COLL VENOUS BLD VENIPUNCTURE: CPT

## 2025-07-03 PROCEDURE — 96374 THER/PROPH/DIAG INJ IV PUSH: CPT

## 2025-07-03 PROCEDURE — 83605 ASSAY OF LACTIC ACID: CPT

## 2025-07-03 PROCEDURE — 99285 EMERGENCY DEPT VISIT HI MDM: CPT | Mod: 25 | Performed by: EMERGENCY MEDICINE

## 2025-07-03 PROCEDURE — 84484 ASSAY OF TROPONIN QUANT: CPT

## 2025-07-03 PROCEDURE — 96375 TX/PRO/DX INJ NEW DRUG ADDON: CPT

## 2025-07-03 PROCEDURE — 51798 US URINE CAPACITY MEASURE: CPT

## 2025-07-03 PROCEDURE — 84484 ASSAY OF TROPONIN QUANT: CPT | Performed by: EMERGENCY MEDICINE

## 2025-07-03 PROCEDURE — 74176 CT ABD & PELVIS W/O CONTRAST: CPT

## 2025-07-03 PROCEDURE — 93005 ELECTROCARDIOGRAM TRACING: CPT

## 2025-07-03 PROCEDURE — 85025 COMPLETE CBC W/AUTO DIFF WBC: CPT

## 2025-07-03 PROCEDURE — 2500000004 HC RX 250 GENERAL PHARMACY W/ HCPCS (ALT 636 FOR OP/ED): Performed by: EMERGENCY MEDICINE

## 2025-07-03 PROCEDURE — 36415 COLL VENOUS BLD VENIPUNCTURE: CPT | Performed by: EMERGENCY MEDICINE

## 2025-07-03 PROCEDURE — 83690 ASSAY OF LIPASE: CPT

## 2025-07-03 PROCEDURE — 81003 URINALYSIS AUTO W/O SCOPE: CPT

## 2025-07-03 PROCEDURE — 80053 COMPREHEN METABOLIC PANEL: CPT

## 2025-07-03 PROCEDURE — 2500000004 HC RX 250 GENERAL PHARMACY W/ HCPCS (ALT 636 FOR OP/ED)

## 2025-07-03 RX ORDER — MORPHINE SULFATE 4 MG/ML
4 INJECTION, SOLUTION INTRAMUSCULAR; INTRAVENOUS ONCE
Status: COMPLETED | OUTPATIENT
Start: 2025-07-03 | End: 2025-07-03

## 2025-07-03 RX ORDER — ONDANSETRON HYDROCHLORIDE 2 MG/ML
4 INJECTION, SOLUTION INTRAVENOUS ONCE
Status: COMPLETED | OUTPATIENT
Start: 2025-07-03 | End: 2025-07-03

## 2025-07-03 RX ORDER — DICYCLOMINE HYDROCHLORIDE 10 MG/1
10 CAPSULE ORAL ONCE
Status: COMPLETED | OUTPATIENT
Start: 2025-07-03 | End: 2025-07-03

## 2025-07-03 RX ORDER — LACTULOSE 10 G/15ML
20 SOLUTION ORAL ONCE
Status: COMPLETED | OUTPATIENT
Start: 2025-07-03 | End: 2025-07-03

## 2025-07-03 RX ORDER — FAMOTIDINE 10 MG/ML
20 INJECTION, SOLUTION INTRAVENOUS ONCE
Status: COMPLETED | OUTPATIENT
Start: 2025-07-03 | End: 2025-07-03

## 2025-07-03 RX ORDER — SYRING-NEEDL,DISP,INSUL,0.3 ML 29 G X1/2"
296 SYRINGE, EMPTY DISPOSABLE MISCELLANEOUS ONCE
Qty: 296 ML | Refills: 0 | Status: SHIPPED | OUTPATIENT
Start: 2025-07-03 | End: 2025-07-03

## 2025-07-03 RX ADMIN — LACTULOSE 20 G: 20 SOLUTION ORAL at 17:41

## 2025-07-03 RX ADMIN — FAMOTIDINE 20 MG: 10 INJECTION, SOLUTION INTRAVENOUS at 18:43

## 2025-07-03 RX ADMIN — MORPHINE SULFATE 4 MG: 4 INJECTION, SOLUTION INTRAMUSCULAR; INTRAVENOUS at 14:51

## 2025-07-03 RX ADMIN — ONDANSETRON 4 MG: 2 INJECTION INTRAMUSCULAR; INTRAVENOUS at 14:50

## 2025-07-03 RX ADMIN — DICYCLOMINE HYDROCHLORIDE 10 MG: 10 CAPSULE ORAL at 17:41

## 2025-07-03 ASSESSMENT — PAIN SCALES - GENERAL
PAINLEVEL_OUTOF10: 4
PAINLEVEL_OUTOF10: 2

## 2025-07-03 ASSESSMENT — PAIN - FUNCTIONAL ASSESSMENT: PAIN_FUNCTIONAL_ASSESSMENT: 0-10

## 2025-07-03 NOTE — ED TRIAGE NOTES
"Pt having abdominal pain after recently being diagnosed with \"tear in stomach\" by her doctor. Pt states she feels like \"her stomach is going to explode\" with current nausea upon arrival. Pt is AO4  "

## 2025-07-03 NOTE — DISCHARGE INSTRUCTIONS
You were seen today in the ED for abdominal pain.  Your imaging showed a large amount of stool burden, or constipation.  You were given pain medications while in the ED.  You were given copies of your imaging reports.  A prescription for magnesium citrate was sent to your pharmacy, please pick this up and take as directed.  Please continue to stay hydrated at home.  Please return to the ED for any worsening symptoms, including but not limited to chest pain, difficulty breathing, fever, chills, blood in your stool, or anything that is concerning to you.

## 2025-07-03 NOTE — CASE COMMUNICATION
Patient texted this nurse to cancel scheduled homecare nursing visit. Patient states she's going [facility].

## 2025-07-03 NOTE — ED PROVIDER NOTES
History of Present Illness     History provided by: Patient  Limitations to History: None  External Records Reviewed with Brief Summary: Previous ED visits, medical history, current medications    HPI:  Florencia Wang is a 69 y.o. female with history of COPD, HTN, HLD, TIA, SLE, hemoglobin C trait, OA, hypothyroidism, migraines, dysphagia who presented to ED with abdominal pain and nausea x 1 day.  She describes abdominal pain as sharp, throughout entire abdomen, and constant.  She stated she has been nauseous, but has not vomited.  She stated that she has not had a bowel movement in the past 3 days, and she was passing gas, but it was difficult.  She stated she is also having difficulty urinating over the past night.  She denied any pain with urination or blood in her urine.  Denied any fevers or chills.  She denied any headache, vision changes, chest pain, shortness of breath, sick contacts.    Physical Exam   Triage vitals:  T 36.2 °C (97.2 °F)  HR 82  BP (!) 133/100  RR 20  O2 100 % None (Room air)    General: Awake, alert, in no acute distress  Eyes: Gaze conjugate.  No scleral icterus or injection  HENT: Normo-cephalic, atraumatic. No stridor  CV: Regular rate, regular rhythm. Radial pulses 2+ bilaterally  Resp: Breathing non-labored, speaking in full sentences.  Clear to auscultation bilaterally  GI: Soft, non-distended, extremely tender to palpation in entire abdomen, worse in bilateral upper quadrants. No rebound or guarding.  MSK/Extremities: No gross bony deformities. Moving all extremities  Skin: Warm. Appropriate color  Neuro: Alert. Oriented. Face symmetric. Speech is fluent.  Gross strength and sensation intact in b/l UE and LEs  Psych: Appropriate mood and affect    Medical Decision Making & ED Course   Medical Decision Makin y.o. female, past medical history presented to the ED with abdominal pain x 1 day and nausea.  She still been passing gas, has not had a bowel movement in 3 days.  On  "physical exam, patient does have a significant tenderness all over her abdomen, worse in bilateral upper quadrants.  She is saturating well on room air, afebrile, not tachycardic.  With the tenderness, concern for possible bowel obstruction, pancreatitis, gallbladder pathology, constipation, electrolyte abnormality, gastritis, gastroenteritis, viral illness, atypical chest pain.  Will obtain basic labs as well as dry skin in the abdomen as patient is allergic to IV contrast.  Morphine and Zofran given for symptoms.  Patient had stable Hgb, no leukocytosis. No concerning electrolyte abnormalities noted. Imaging showed large amount of stool throughout the colon, no bowel obstruction noted. Patient given pepsid, lactulose, and Bentyl for symptoms. Troponin mildly elevated, EKG non concerning and patient denied chest pain. Discussed with patient that her pain was likely secondary to her constipation. Magnesium citrate sent to patient's pharmacy, return precautions discussed. She does have an appointment with GI scheduled outpatient. Patient appropriate for discharge at this time and agreeable to discharge.         ED Course:  ED Course as of 07/04/25 2202   Thu Jul 03, 2025   1439 Pt seen with resident - 69yF presents with severe abd pain - says she feels like \"my stomach is going to explode\" and says she was told she had a \"tear in my stomach\" by her dr recently.  Pt hypertensive but afebrile and not tachycardic on ED arrival. [EK]   1931 EKG obtained given abd pain and independently interpreted by me showing SR with 1st deg AVB ?LAD no ischemic changes overall similar compared to prior  [EK]      ED Course User Index  [EK] Elyse H Klerman, MD         Diagnoses as of 07/04/25 2202   Constipation, unspecified constipation type   Abdominal pain, generalized       EKG Independent Interpretation: See ED course  ----    Differential diagnoses considered include but are not limited to: see MDM     Social Determinants of " Health which Significantly Impact Care: None identified     The patient was discussed with the following consultants/services: None      Disposition   As a result of the work-up, the patient was discharged home.  she was informed of her diagnosis and instructed to come back with any concerns or worsening of condition.  she and was agreeable to the plan as discussed above.  she was given the opportunity to ask questions.  All of the patient's questions were answered.    Procedures   Procedures    Patient seen and discussed with ED attending physician.    Vonda Mendoza DO  Emergency Medicine     Vonda Mendoza DO  Resident  07/04/25 9848

## 2025-07-03 NOTE — TELEPHONE ENCOUNTER
Pt daughter called and said mom has really bad stomach pain and she has not really ate much. I recommended that pt goes to ED

## 2025-07-07 LAB
ATRIAL RATE: 75 BPM
HOLD SPECIMEN: NORMAL
P AXIS: 73 DEGREES
PR INTERVAL: 205 MS
Q ONSET: 251 MS
QRS COUNT: 13 BEATS
QRS DURATION: 76 MS
QT INTERVAL: 353 MS
QTC CALCULATION(BAZETT): 397 MS
QTC FREDERICIA: 381 MS
R AXIS: -49 DEGREES
T AXIS: 70 DEGREES
T OFFSET: 428 MS
VENTRICULAR RATE: 76 BPM

## 2025-07-08 ENCOUNTER — HOME CARE VISIT (OUTPATIENT)
Dept: HOME HEALTH SERVICES | Facility: HOME HEALTH | Age: 70
End: 2025-07-08
Payer: MEDICARE

## 2025-07-08 VITALS
HEART RATE: 70 BPM | RESPIRATION RATE: 16 BRPM | DIASTOLIC BLOOD PRESSURE: 70 MMHG | SYSTOLIC BLOOD PRESSURE: 110 MMHG | OXYGEN SATURATION: 98 % | TEMPERATURE: 97.8 F

## 2025-07-08 PROCEDURE — G0300 HHS/HOSPICE OF LPN EA 15 MIN: HCPCS | Mod: HHH

## 2025-07-08 ASSESSMENT — ENCOUNTER SYMPTOMS
PERSON REPORTING PAIN: PATIENT
APPETITE LEVEL: GOOD
DENIES PAIN: 1
HIGHEST PAIN SEVERITY IN PAST 24 HOURS: 0/10
CHANGE IN APPETITE: UNCHANGED
LOWEST PAIN SEVERITY IN PAST 24 HOURS: 0/10
PAIN SEVERITY GOAL: 0/10

## 2025-07-09 ENCOUNTER — HOME CARE VISIT (OUTPATIENT)
Dept: HOME HEALTH SERVICES | Facility: HOME HEALTH | Age: 70
End: 2025-07-09
Payer: MEDICARE

## 2025-07-11 ENCOUNTER — HOME CARE VISIT (OUTPATIENT)
Dept: HOME HEALTH SERVICES | Facility: HOME HEALTH | Age: 70
End: 2025-07-11
Payer: MEDICARE

## 2025-07-15 ENCOUNTER — HOME CARE VISIT (OUTPATIENT)
Dept: HOME HEALTH SERVICES | Facility: HOME HEALTH | Age: 70
End: 2025-07-15
Payer: MEDICARE

## 2025-07-15 DIAGNOSIS — I50.9 HEART FAILURE, UNSPECIFIED HF CHRONICITY, UNSPECIFIED HEART FAILURE TYPE: ICD-10-CM

## 2025-07-15 RX ORDER — FUROSEMIDE 20 MG/1
20 TABLET ORAL DAILY PRN
Qty: 30 TABLET | Refills: 0 | Status: SHIPPED | OUTPATIENT
Start: 2025-07-15 | End: 2025-07-17

## 2025-07-16 ENCOUNTER — HOME CARE VISIT (OUTPATIENT)
Dept: HOME HEALTH SERVICES | Facility: HOME HEALTH | Age: 70
End: 2025-07-16
Payer: MEDICARE

## 2025-07-16 ENCOUNTER — TELEPHONE (OUTPATIENT)
Dept: PRIMARY CARE | Facility: CLINIC | Age: 70
End: 2025-07-16
Payer: MEDICARE

## 2025-07-16 VITALS
DIASTOLIC BLOOD PRESSURE: 68 MMHG | SYSTOLIC BLOOD PRESSURE: 109 MMHG | RESPIRATION RATE: 18 BRPM | TEMPERATURE: 99.2 F | OXYGEN SATURATION: 99 % | HEART RATE: 83 BPM

## 2025-07-16 DIAGNOSIS — E78.2 MIXED HYPERLIPIDEMIA: ICD-10-CM

## 2025-07-16 DIAGNOSIS — F51.01 PRIMARY INSOMNIA: ICD-10-CM

## 2025-07-16 DIAGNOSIS — I50.9 HEART FAILURE, UNSPECIFIED HF CHRONICITY, UNSPECIFIED HEART FAILURE TYPE: ICD-10-CM

## 2025-07-16 PROCEDURE — G0300 HHS/HOSPICE OF LPN EA 15 MIN: HCPCS | Mod: HHH

## 2025-07-16 PROCEDURE — G0151 HHCP-SERV OF PT,EA 15 MIN: HCPCS | Mod: HHH

## 2025-07-16 SDOH — HEALTH STABILITY: PHYSICAL HEALTH: EXERCISE TYPE: SEATED

## 2025-07-16 SDOH — HEALTH STABILITY: PHYSICAL HEALTH: EXERCISE COMMENTS: HEEL TOE RAISE, MARCHING, LAQ, HIP ADDUCTION

## 2025-07-16 ASSESSMENT — ENCOUNTER SYMPTOMS
PERSON REPORTING PAIN: PATIENT
PAIN LOCATION: BACK
SUBJECTIVE PAIN PROGRESSION: UNCHANGED
LOWEST PAIN SEVERITY IN PAST 24 HOURS: 7/10
CHANGE IN APPETITE: UNCHANGED
PAIN SEVERITY GOAL: 0/10
LOWEST PAIN SEVERITY IN PAST 24 HOURS: 6/10
APPETITE LEVEL: GOOD
PAIN LOCATION - PAIN SEVERITY: 9/10
HIGHEST PAIN SEVERITY IN PAST 24 HOURS: 10/10
HIGHEST PAIN SEVERITY IN PAST 24 HOURS: 6/10
PERSON REPORTING PAIN: PATIENT
PAIN: 1
PAIN: 1

## 2025-07-16 NOTE — TELEPHONE ENCOUNTER
Patient called and said she called last night and nobody called back.  She has parasites coming out of her colon.  She is in terrible pain.  She would like a call back asap!    Thanks.

## 2025-07-17 RX ORDER — FUROSEMIDE 20 MG/1
20 TABLET ORAL DAILY PRN
Qty: 30 TABLET | Refills: 0 | Status: SHIPPED | OUTPATIENT
Start: 2025-07-17 | End: 2025-08-16

## 2025-07-17 RX ORDER — ZOLPIDEM TARTRATE 10 MG/1
10 TABLET ORAL NIGHTLY
Qty: 30 TABLET | Refills: 5 | Status: SHIPPED | OUTPATIENT
Start: 2025-07-17

## 2025-07-17 RX ORDER — ATORVASTATIN CALCIUM 20 MG/1
20 TABLET, FILM COATED ORAL DAILY
Qty: 30 TABLET | Refills: 3 | Status: SHIPPED | OUTPATIENT
Start: 2025-07-17

## 2025-07-18 ENCOUNTER — TELEPHONE (OUTPATIENT)
Dept: PRIMARY CARE | Facility: CLINIC | Age: 70
End: 2025-07-18

## 2025-07-18 ENCOUNTER — OFFICE VISIT (OUTPATIENT)
Dept: PRIMARY CARE | Facility: CLINIC | Age: 70
End: 2025-07-18
Payer: MEDICARE

## 2025-07-18 VITALS — BODY MASS INDEX: 18.61 KG/M2 | WEIGHT: 145 LBS

## 2025-07-18 DIAGNOSIS — K59.09 OTHER CONSTIPATION: ICD-10-CM

## 2025-07-18 DIAGNOSIS — B71.9 TAPEWORM: Primary | ICD-10-CM

## 2025-07-18 LAB
ATRIAL RATE: 79 BPM
P AXIS: 69 DEGREES
PR INTERVAL: 190 MS
Q ONSET: 249 MS
QRS COUNT: 13 BEATS
QRS DURATION: 93 MS
QT INTERVAL: 344 MS
QTC CALCULATION(BAZETT): 395 MS
QTC FREDERICIA: 377 MS
R AXIS: 264 DEGREES
T AXIS: 47 DEGREES
T OFFSET: 421 MS
VENTRICULAR RATE: 79 BPM

## 2025-07-18 PROCEDURE — 99213 OFFICE O/P EST LOW 20 MIN: CPT | Performed by: STUDENT IN AN ORGANIZED HEALTH CARE EDUCATION/TRAINING PROGRAM

## 2025-07-18 PROCEDURE — 1111F DSCHRG MED/CURRENT MED MERGE: CPT | Performed by: STUDENT IN AN ORGANIZED HEALTH CARE EDUCATION/TRAINING PROGRAM

## 2025-07-18 PROCEDURE — 1159F MED LIST DOCD IN RCRD: CPT | Performed by: STUDENT IN AN ORGANIZED HEALTH CARE EDUCATION/TRAINING PROGRAM

## 2025-07-18 RX ORDER — PRAZIQUANTEL 600 MG/1
600 TABLET, FILM COATED ORAL 3 TIMES DAILY
Qty: 1 TABLET | Refills: 0 | Status: SHIPPED | OUTPATIENT
Start: 2025-07-18 | End: 2025-07-18 | Stop reason: SDUPTHER

## 2025-07-18 RX ORDER — AMOXICILLIN 250 MG
1 CAPSULE ORAL DAILY
Qty: 30 TABLET | Refills: 11 | Status: SHIPPED | OUTPATIENT
Start: 2025-07-18 | End: 2026-07-18

## 2025-07-18 RX ORDER — PRAZIQUANTEL 600 MG/1
600 TABLET, FILM COATED ORAL ONCE
Qty: 1 TABLET | Refills: 0 | Status: SHIPPED | OUTPATIENT
Start: 2025-07-18 | End: 2025-07-22

## 2025-07-18 ASSESSMENT — ENCOUNTER SYMPTOMS: DEPRESSION: 0

## 2025-07-18 NOTE — PROGRESS NOTES
Subjective   Patient ID: Florencia Wang is a 69 y.o. female who presents for ER Follow-up (Slimey stool/Tapeworms she thinks is /Gasey/).    HPI     States she has some abdomianl pain and constipation. States when she had a bm. She found a tapeworm. Described has flat long, and clear with a head. No picture of this so can't confirm. Does not eat pork but does he beef. Is on immunosuprresion as well    Review of Systems   All other systems reviewed and are negative.      Objective   There were no vitals filed for this visit.    Wt 65.8 kg (145 lb)   BMI 18.61 kg/m²     Physical Exam  Constitutional:       Appearance: Normal appearance.   HENT:      Head: Normocephalic and atraumatic.      Right Ear: Tympanic membrane and ear canal normal.      Left Ear: Tympanic membrane and ear canal normal.      Mouth/Throat:      Mouth: Mucous membranes are moist.      Pharynx: Oropharynx is clear.     Eyes:      Extraocular Movements: Extraocular movements intact.      Conjunctiva/sclera: Conjunctivae normal.      Pupils: Pupils are equal, round, and reactive to light.       Cardiovascular:      Rate and Rhythm: Normal rate and regular rhythm.      Pulses: Normal pulses.      Heart sounds: Normal heart sounds.   Pulmonary:      Effort: Pulmonary effort is normal.      Breath sounds: Normal breath sounds.   Abdominal:      General: Abdomen is flat. Bowel sounds are normal.      Palpations: Abdomen is soft.     Musculoskeletal:         General: Normal range of motion.      Cervical back: Normal range of motion and neck supple.     Skin:     General: Skin is warm and dry.      Capillary Refill: Capillary refill takes 2 to 3 seconds.     Neurological:      General: No focal deficit present.      Mental Status: She is alert and oriented to person, place, and time. Mental status is at baseline.     Psychiatric:         Mood and Affect: Mood normal.         Behavior: Behavior normal.         Thought Content: Thought content normal.          Judgment: Judgment normal.         Assessment/Plan   1. Tapeworm (Primary)    - praziquantel (Biltricide) 600 mg tablet; Take 1 tablet (600 mg) by mouth 3 times a day for 1 day.  Dispense: 1 tablet; Refill: 0  - will treat for tapeworm givne high risk and immunocompromised state  - suggest GI for colonscopy as well

## 2025-07-25 ENCOUNTER — HOME CARE VISIT (OUTPATIENT)
Dept: HOME HEALTH SERVICES | Facility: HOME HEALTH | Age: 70
End: 2025-07-25
Payer: MEDICARE

## 2025-07-25 DIAGNOSIS — M32.9 SYSTEMIC LUPUS ERYTHEMATOSUS, UNSPECIFIED SLE TYPE, UNSPECIFIED ORGAN INVOLVEMENT STATUS (MULTI): ICD-10-CM

## 2025-07-25 RX ORDER — HYDROXYCHLOROQUINE SULFATE 200 MG/1
TABLET, FILM COATED ORAL DAILY
Qty: 30 TABLET | Refills: 2 | Status: SHIPPED | OUTPATIENT
Start: 2025-07-25

## 2025-07-25 ASSESSMENT — ACTIVITIES OF DAILY LIVING (ADL)
HOME_HEALTH_OASIS: 00
OASIS_M1830: 00

## 2025-07-28 LAB
ATRIAL RATE: 75 BPM
P AXIS: 73 DEGREES
PR INTERVAL: 205 MS
Q ONSET: 251 MS
QRS COUNT: 13 BEATS
QRS DURATION: 76 MS
QT INTERVAL: 353 MS
QTC CALCULATION(BAZETT): 397 MS
QTC FREDERICIA: 381 MS
R AXIS: -49 DEGREES
T AXIS: 70 DEGREES
T OFFSET: 428 MS
VENTRICULAR RATE: 76 BPM

## 2025-07-30 DIAGNOSIS — K64.0 GRADE I HEMORRHOIDS: Primary | ICD-10-CM

## 2025-07-30 RX ORDER — HYDROCORTISONE 25 MG/G
CREAM TOPICAL 4 TIMES DAILY PRN
Qty: 30 G | Refills: 0 | Status: SHIPPED | OUTPATIENT
Start: 2025-07-30 | End: 2025-08-05

## 2025-07-30 NOTE — TELEPHONE ENCOUNTER
Patient called and said she took the pill Dr. Reyna told her to take for the parasites and now she is all swollen on her behind.  Her Hemorids are bleeding.  She has blood coming out even when not going to the bathroom.      Thank you.

## 2025-08-04 ENCOUNTER — APPOINTMENT (OUTPATIENT)
Dept: RADIOLOGY | Facility: CLINIC | Age: 70
End: 2025-08-04
Payer: MEDICARE

## 2025-08-05 DIAGNOSIS — K64.0 GRADE I HEMORRHOIDS: ICD-10-CM

## 2025-08-05 RX ORDER — HYDROCORTISONE 25 MG/G
CREAM TOPICAL
Qty: 30 G | Refills: 0 | Status: SHIPPED | OUTPATIENT
Start: 2025-08-05 | End: 2025-08-07

## 2025-08-06 DIAGNOSIS — K64.0 GRADE I HEMORRHOIDS: ICD-10-CM

## 2025-08-07 ENCOUNTER — TELEPHONE (OUTPATIENT)
Dept: PRIMARY CARE | Facility: CLINIC | Age: 70
End: 2025-08-07
Payer: MEDICARE

## 2025-08-07 ENCOUNTER — APPOINTMENT (OUTPATIENT)
Dept: RADIOLOGY | Facility: CLINIC | Age: 70
End: 2025-08-07
Payer: MEDICARE

## 2025-08-07 RX ORDER — HYDROCORTISONE 25 MG/G
CREAM TOPICAL
Qty: 30 G | Refills: 0 | Status: SHIPPED | OUTPATIENT
Start: 2025-08-07

## 2025-08-07 NOTE — TELEPHONE ENCOUNTER
Patient called and said she is about to get released from the hospital.  She needs for the following supplies to be ordered and sent to her:    12 cloth chux  2 packages 33 ct each of pads  2 boxes of gloves    Thanks!

## 2025-08-07 NOTE — TELEPHONE ENCOUNTER
"Patient called back.  They are not releasing her till Saturday.  She said they have her on \"Topamax\".  It is not working.  Can Dr. Reyna call them and prescribe something else?    Thanks  "

## 2025-08-08 DIAGNOSIS — M32.9 SYSTEMIC LUPUS ERYTHEMATOSUS, UNSPECIFIED SLE TYPE, UNSPECIFIED ORGAN INVOLVEMENT STATUS (MULTI): ICD-10-CM

## 2025-08-09 ENCOUNTER — HOSPITAL ENCOUNTER (EMERGENCY)
Facility: HOSPITAL | Age: 70
Discharge: HOME | End: 2025-08-09
Payer: MEDICARE

## 2025-08-09 VITALS
HEIGHT: 67 IN | WEIGHT: 145 LBS | BODY MASS INDEX: 22.76 KG/M2 | HEART RATE: 89 BPM | TEMPERATURE: 97.2 F | OXYGEN SATURATION: 100 % | DIASTOLIC BLOOD PRESSURE: 57 MMHG | SYSTOLIC BLOOD PRESSURE: 112 MMHG | RESPIRATION RATE: 18 BRPM

## 2025-08-09 DIAGNOSIS — M48.061 FORAMINAL STENOSIS OF LUMBAR REGION: ICD-10-CM

## 2025-08-09 DIAGNOSIS — Z04.1 EXAM FOLLOWING MVC (MOTOR VEHICLE COLLISION), NO APPARENT INJURY: Primary | ICD-10-CM

## 2025-08-09 PROCEDURE — 2500000004 HC RX 250 GENERAL PHARMACY W/ HCPCS (ALT 636 FOR OP/ED): Mod: JW

## 2025-08-09 PROCEDURE — 96372 THER/PROPH/DIAG INJ SC/IM: CPT

## 2025-08-09 PROCEDURE — 99284 EMERGENCY DEPT VISIT MOD MDM: CPT

## 2025-08-09 RX ORDER — KETOROLAC TROMETHAMINE 30 MG/ML
15 INJECTION, SOLUTION INTRAMUSCULAR; INTRAVENOUS ONCE
Status: COMPLETED | OUTPATIENT
Start: 2025-08-09 | End: 2025-08-09

## 2025-08-09 RX ORDER — METHOCARBAMOL 100 MG/ML
500 INJECTION, SOLUTION INTRAMUSCULAR; INTRAVENOUS ONCE
Status: DISCONTINUED | OUTPATIENT
Start: 2025-08-09 | End: 2025-08-09

## 2025-08-09 RX ORDER — TIZANIDINE 4 MG/1
8 TABLET ORAL 3 TIMES DAILY PRN
Qty: 18 TABLET | Refills: 0 | Status: SHIPPED | OUTPATIENT
Start: 2025-08-09 | End: 2025-08-12

## 2025-08-09 RX ADMIN — KETOROLAC TROMETHAMINE 15 MG: 30 INJECTION, SOLUTION INTRAMUSCULAR at 17:20
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
## 2025-08-09 NOTE — DISCHARGE INSTRUCTIONS
Please follow-up with your PCP within 1 week regarding visit today.  Return to the emergency department if develop any worsening symptoms, inability to ambulate, trouble breathing.  Do not drive or operate heavy machinery under the influence of muscle relaxers as they are sedating.  You may supplement this with 1000 mg Tylenol and 800 mg ibuprofen every 6 hours as needed for pain.
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
## 2025-08-11 ENCOUNTER — TELEPHONE (OUTPATIENT)
Dept: PRIMARY CARE | Facility: CLINIC | Age: 70
End: 2025-08-11
Payer: MEDICARE

## 2025-08-11 RX ORDER — PREDNISONE 10 MG/1
10 TABLET ORAL EVERY OTHER DAY
Qty: 15 TABLET | Refills: 2 | Status: SHIPPED | OUTPATIENT
Start: 2025-08-11

## 2025-08-12 ENCOUNTER — OFFICE VISIT (OUTPATIENT)
Dept: PRIMARY CARE | Facility: CLINIC | Age: 70
End: 2025-08-12
Payer: MEDICARE

## 2025-08-12 VITALS
BODY MASS INDEX: 23.34 KG/M2 | OXYGEN SATURATION: 98 % | HEART RATE: 72 BPM | WEIGHT: 149 LBS | DIASTOLIC BLOOD PRESSURE: 68 MMHG | SYSTOLIC BLOOD PRESSURE: 118 MMHG
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
## 2025-08-12 RX ORDER — ERGOCALCIFEROL 1.25 MG/1
1.25 CAPSULE ORAL
Qty: 14 CAPSULE | Refills: 1 | Status: SHIPPED | OUTPATIENT
Start: 2025-08-12

## 2025-08-12 RX ORDER — OXYCODONE AND ACETAMINOPHEN 5; 325 MG/1; MG/1
1 TABLET ORAL EVERY 6 HOURS PRN
Qty: 20 TABLET | Refills: 0 | Status: SHIPPED | OUTPATIENT
Start: 2025-08-12 | End: 2025-08-19

## 2025-08-12 ASSESSMENT — ENCOUNTER SYMPTOMS: DEPRESSION: 0

## 2025-08-13 DIAGNOSIS — I50.9 HEART FAILURE, UNSPECIFIED HF CHRONICITY, UNSPECIFIED HEART FAILURE TYPE: ICD-10-CM

## 2025-08-13 DIAGNOSIS — R60.9 EDEMA, UNSPECIFIED TYPE: ICD-10-CM

## 2025-08-13 DIAGNOSIS — F41.1 GENERALIZED ANXIETY DISORDER: ICD-10-CM

## 2025-08-14 RX ORDER — DULOXETIN HYDROCHLORIDE 60 MG/1
60 CAPSULE, DELAYED RELEASE ORAL DAILY
Qty: 30 CAPSULE | Refills: 2 | Status: SHIPPED | OUTPATIENT
Start: 2025-08-14

## 2025-08-14 RX ORDER — FUROSEMIDE 20 MG/1
20 TABLET ORAL DAILY PRN
Qty: 30 TABLET | Refills: 0 | Status: SHIPPED | OUTPATIENT
Start: 2025-08-14 | End: 2025-09-13

## 2025-08-14 RX ORDER — POTASSIUM CHLORIDE 1500 MG/1
20 TABLET, EXTENDED RELEASE ORAL DAILY
Qty: 30 TABLET | Refills: 2 | Status: SHIPPED | OUTPATIENT
Start: 2025-08-14

## 2025-08-21 DIAGNOSIS — K64.0 GRADE I HEMORRHOIDS: ICD-10-CM

## 2025-08-21 RX ORDER — HYDROCORTISONE 25 MG/G
CREAM TOPICAL
Qty: 30 G | Refills: 0 | Status: SHIPPED | OUTPATIENT
Start: 2025-08-21

## 2025-08-28 ASSESSMENT — ENCOUNTER SYMPTOMS
CONSTITUTIONAL NEGATIVE: 1
ENDOCRINE NEGATIVE: 1
ALLERGIC/IMMUNOLOGIC NEGATIVE: 1
GASTROINTESTINAL NEGATIVE: 1
HEMATOLOGIC/LYMPHATIC NEGATIVE: 1
PSYCHIATRIC NEGATIVE: 1
EYES NEGATIVE: 1
CARDIOVASCULAR NEGATIVE: 1
MUSCULOSKELETAL NEGATIVE: 1
NEUROLOGICAL NEGATIVE: 1
RESPIRATORY NEGATIVE: 1

## 2025-08-29 ENCOUNTER — APPOINTMENT (OUTPATIENT)
Dept: GASTROENTEROLOGY | Facility: CLINIC | Age: 70
End: 2025-08-29
Payer: MEDICARE

## 2025-08-29 VITALS
WEIGHT: 148 LBS | BODY MASS INDEX: 20.05 KG/M2 | SYSTOLIC BLOOD PRESSURE: 112 MMHG | DIASTOLIC BLOOD PRESSURE: 71 MMHG | HEIGHT: 72 IN | HEART RATE: 74 BPM

## 2025-08-29 DIAGNOSIS — Z86.0100 HISTORY OF COLON POLYPS: ICD-10-CM

## 2025-08-29 DIAGNOSIS — K22.4 ESOPHAGEAL DYSMOTILITY: Primary | ICD-10-CM

## 2025-08-29 DIAGNOSIS — K61.1 RECTAL ABSCESS: ICD-10-CM

## 2025-08-29 RX ORDER — POLYETHYLENE GLYCOL 3350, SODIUM SULFATE ANHYDROUS, SODIUM BICARBONATE, SODIUM CHLORIDE, POTASSIUM CHLORIDE 236; 22.74; 6.74; 5.86; 2.97 G/4L; G/4L; G/4L; G/4L; G/4L
4000 POWDER, FOR SOLUTION ORAL ONCE
Qty: 4000 ML | Refills: 0 | Status: SHIPPED | OUTPATIENT
Start: 2025-08-29 | End: 2025-08-30

## 2025-10-01 ENCOUNTER — APPOINTMENT (OUTPATIENT)
Dept: PRIMARY CARE | Facility: CLINIC | Age: 70
End: 2025-10-01
Payer: MEDICARE

## 2025-10-14 ENCOUNTER — APPOINTMENT (OUTPATIENT)
Dept: NEUROLOGY | Facility: CLINIC | Age: 70
End: 2025-10-14
Payer: MEDICARE

## 2025-11-26 ENCOUNTER — APPOINTMENT (OUTPATIENT)
Dept: RHEUMATOLOGY | Facility: CLINIC | Age: 70
End: 2025-11-26
Payer: COMMERCIAL